# Patient Record
Sex: FEMALE | Race: WHITE | Employment: PART TIME | URBAN - METROPOLITAN AREA
[De-identification: names, ages, dates, MRNs, and addresses within clinical notes are randomized per-mention and may not be internally consistent; named-entity substitution may affect disease eponyms.]

---

## 2017-12-10 ENCOUNTER — APPOINTMENT (OUTPATIENT)
Dept: ULTRASOUND IMAGING | Age: 32
End: 2017-12-10

## 2017-12-10 ENCOUNTER — HOSPITAL ENCOUNTER (EMERGENCY)
Age: 32
Discharge: HOME | End: 2017-12-10
Attending: EMERGENCY MEDICINE

## 2017-12-10 VITALS
BODY MASS INDEX: 27.49 KG/M2 | OXYGEN SATURATION: 98 % | HEIGHT: 65 IN | TEMPERATURE: 97.7 F | DIASTOLIC BLOOD PRESSURE: 78 MMHG | SYSTOLIC BLOOD PRESSURE: 121 MMHG | WEIGHT: 165 LBS | RESPIRATION RATE: 18 BRPM | HEART RATE: 94 BPM

## 2017-12-10 DIAGNOSIS — O23.41 URINARY TRACT INFECTION IN MOTHER DURING FIRST TRIMESTER OF PREGNANCY: ICD-10-CM

## 2017-12-10 DIAGNOSIS — Z34.90 INTRAUTERINE PREGNANCY: ICD-10-CM

## 2017-12-10 DIAGNOSIS — O20.0 THREATENED ABORTION, ANTEPARTUM: Primary | ICD-10-CM

## 2017-12-10 LAB
-: ABNORMAL
ABO/RH: NORMAL
ABSOLUTE EOS #: 0.15 K/UL (ref 0–0.44)
ABSOLUTE IMMATURE GRANULOCYTE: <0.03 K/UL (ref 0–0.3)
ABSOLUTE LYMPH #: 2.06 K/UL (ref 1.1–3.7)
ABSOLUTE MONO #: 0.38 K/UL (ref 0.1–1.2)
AMORPHOUS: ABNORMAL
ANION GAP SERPL CALCULATED.3IONS-SCNC: 13 MMOL/L (ref 9–17)
BACTERIA: ABNORMAL
BASOPHILS # BLD: 0 % (ref 0–2)
BASOPHILS ABSOLUTE: <0.03 K/UL (ref 0–0.2)
BILIRUBIN URINE: NEGATIVE
BUN BLDV-MCNC: 5 MG/DL (ref 6–20)
BUN/CREAT BLD: ABNORMAL (ref 9–20)
CALCIUM SERPL-MCNC: 8.4 MG/DL (ref 8.6–10.4)
CASTS UA: ABNORMAL /LPF (ref 0–8)
CHLORIDE BLD-SCNC: 99 MMOL/L (ref 98–107)
CO2: 23 MMOL/L (ref 20–31)
COLOR: YELLOW
COMMENT UA: ABNORMAL
CREAT SERPL-MCNC: 0.44 MG/DL (ref 0.5–0.9)
CRYSTALS, UA: ABNORMAL /HPF
DIFFERENTIAL TYPE: NORMAL
DIRECT EXAM: ABNORMAL
EOSINOPHILS RELATIVE PERCENT: 2 % (ref 1–4)
EPITHELIAL CELLS UA: ABNORMAL /HPF (ref 0–5)
GFR AFRICAN AMERICAN: >60 ML/MIN
GFR NON-AFRICAN AMERICAN: >60 ML/MIN
GFR SERPL CREATININE-BSD FRML MDRD: ABNORMAL ML/MIN/{1.73_M2}
GFR SERPL CREATININE-BSD FRML MDRD: ABNORMAL ML/MIN/{1.73_M2}
GLUCOSE BLD-MCNC: 76 MG/DL (ref 70–99)
GLUCOSE URINE: NEGATIVE
HCG QUANTITATIVE: ABNORMAL IU/L
HCT VFR BLD CALC: 39.9 % (ref 36.3–47.1)
HEMOGLOBIN: 13 G/DL (ref 11.9–15.1)
IMMATURE GRANULOCYTES: 0 %
KETONES, URINE: NEGATIVE
LEUKOCYTE ESTERASE, URINE: NEGATIVE
LYMPHOCYTES # BLD: 29 % (ref 24–43)
Lab: ABNORMAL
MCH RBC QN AUTO: 30.2 PG (ref 25.2–33.5)
MCHC RBC AUTO-ENTMCNC: 32.6 G/DL (ref 28.4–34.8)
MCV RBC AUTO: 92.6 FL (ref 82.6–102.9)
MONOCYTES # BLD: 5 % (ref 3–12)
MUCUS: ABNORMAL
NITRITE, URINE: NEGATIVE
OTHER OBSERVATIONS UA: ABNORMAL
PDW BLD-RTO: 13.5 % (ref 11.8–14.4)
PH UA: 6.5 (ref 5–8)
PLATELET # BLD: 225 K/UL (ref 138–453)
PLATELET ESTIMATE: NORMAL
PMV BLD AUTO: 10.6 FL (ref 8.1–13.5)
POTASSIUM SERPL-SCNC: 4 MMOL/L (ref 3.7–5.3)
PROTEIN UA: NEGATIVE
RBC # BLD: 4.31 M/UL (ref 3.95–5.11)
RBC # BLD: NORMAL 10*6/UL
RBC UA: ABNORMAL /HPF (ref 0–4)
RENAL EPITHELIAL, UA: ABNORMAL /HPF
SEG NEUTROPHILS: 64 % (ref 36–65)
SEGMENTED NEUTROPHILS ABSOLUTE COUNT: 4.51 K/UL (ref 1.5–8.1)
SODIUM BLD-SCNC: 135 MMOL/L (ref 135–144)
SPECIFIC GRAVITY UA: 1.01 (ref 1–1.03)
SPECIMEN DESCRIPTION: ABNORMAL
STATUS: ABNORMAL
TRICHOMONAS: ABNORMAL
TURBIDITY: CLEAR
URINE HGB: ABNORMAL
UROBILINOGEN, URINE: NORMAL
WBC # BLD: 7.1 K/UL (ref 3.5–11.3)
WBC # BLD: NORMAL 10*3/UL
WBC UA: ABNORMAL /HPF (ref 0–5)
YEAST: ABNORMAL

## 2017-12-10 PROCEDURE — 87086 URINE CULTURE/COLONY COUNT: CPT

## 2017-12-10 PROCEDURE — 84702 CHORIONIC GONADOTROPIN TEST: CPT

## 2017-12-10 PROCEDURE — 85025 COMPLETE CBC W/AUTO DIFF WBC: CPT

## 2017-12-10 PROCEDURE — 87491 CHLMYD TRACH DNA AMP PROBE: CPT

## 2017-12-10 PROCEDURE — 76817 TRANSVAGINAL US OBSTETRIC: CPT

## 2017-12-10 PROCEDURE — 86900 BLOOD TYPING SEROLOGIC ABO: CPT

## 2017-12-10 PROCEDURE — 86901 BLOOD TYPING SEROLOGIC RH(D): CPT

## 2017-12-10 PROCEDURE — 80048 BASIC METABOLIC PNL TOTAL CA: CPT

## 2017-12-10 PROCEDURE — 81001 URINALYSIS AUTO W/SCOPE: CPT

## 2017-12-10 PROCEDURE — 87591 N.GONORRHOEAE DNA AMP PROB: CPT

## 2017-12-10 PROCEDURE — 87660 TRICHOMONAS VAGIN DIR PROBE: CPT

## 2017-12-10 PROCEDURE — 76815 OB US LIMITED FETUS(S): CPT

## 2017-12-10 PROCEDURE — 87480 CANDIDA DNA DIR PROBE: CPT

## 2017-12-10 PROCEDURE — 99284 EMERGENCY DEPT VISIT MOD MDM: CPT

## 2017-12-10 PROCEDURE — 87510 GARDNER VAG DNA DIR PROBE: CPT

## 2017-12-10 RX ORDER — CEPHALEXIN 500 MG/1
500 CAPSULE ORAL 4 TIMES DAILY
Qty: 28 CAPSULE | Refills: 0 | Status: SHIPPED | OUTPATIENT
Start: 2017-12-10 | End: 2017-12-17

## 2017-12-10 RX ORDER — PRENATAL VIT/IRON FUM/FOLIC AC 27MG-0.8MG
1 TABLET ORAL DAILY
Qty: 30 TABLET | Refills: 0 | Status: SHIPPED | OUTPATIENT
Start: 2017-12-10 | End: 2018-04-02

## 2017-12-10 ASSESSMENT — ENCOUNTER SYMPTOMS
BACK PAIN: 0
ABDOMINAL PAIN: 0
WHEEZING: 0
SHORTNESS OF BREATH: 0
COLOR CHANGE: 0
NAUSEA: 0
VOMITING: 0

## 2017-12-10 NOTE — ED PROVIDER NOTES
[facility]     Emergency Department     Faculty Attestation    I performed a history and physical examination of the patient and discussed management with the resident. I reviewed the residents note and agree with the documented findings and plan of care. Any areas of disagreement are noted on the chart. I was personally present for the key portions of any procedures. I have documented in the chart those procedures where I was not present during the key portions. I have reviewed the emergency nurses triage note. I agree with the chief complaint, past medical history, past surgical history, allergies, medications, social and family history as documented unless otherwise noted below. For Physician Assistant/ Nurse Practitioner cases/documentation I have personally evaluated this patient and have completed at least one if not all key elements of the E/M (history, physical exam, and MDM). Additional findings are as noted. I have personally seen and evaluated the patient. I find the patient's history and physical exam are consistent with the NP/PA documentation. I agree with the care provided, treatment rendered, disposition and follow-up plan. Reporting vaginal bleeding abdomen soft nontender no other complaints. Critical Care     Kayode Dyer M.D.   Attending Emergency  Physician              Carlton Stovall MD  12/10/17 [id number]

## 2017-12-10 NOTE — ED PROVIDER NOTES
[facility]  ED  Emergency Department Encounter  Emergency Medicine Resident     Pt Name: Gem Gonzalez  MRN: [id number]  Bridgettegfpan 1985  Date of evaluation: 12/10/17  PCP:  Jackson Reich NP    [address] [place] [address]       Chief Complaint   Patient presents with    Vaginal Bleeding     pt reports when went to bathroom before coming to ER, pt wiped and had small amount of pink on the toliet paper. pt denies pain        HISTORY OF PRESENT ILLNESS  (Location/Symptom, Timing/Onset, Context/Setting, Quality, Duration, Modifying Factors, Severity.)      Gem Gonzalez is a 28 y.o. female who presents with Vaginal spotting that happened about 10 minutes prior to arrival to the emergency department. Patient says that she had blood on wiping which she believes that was vaginal blood. Patient says that she had a home pregnancy test that was +1 month ago, her last menstrual period was in September, she has not followed up with OB due to lack of insurance. Denies any history of previous STD, denies any history of ectopic pregnancy versus early however she does have a family history of ectopic pregnancy in her sister. Patient has been taking her prenatal vitamins. Denies any vaginal discharge or pelvic pain. Denies any abdominal pain, nausea, vomiting, shortness of breath. Patient has not had a pelvic ultrasound to confirm a viable intrauterine pregnancy so far. Patient does not know her exact blood type either. PAST MEDICAL / SURGICAL / SOCIAL / FAMILY HISTORY      has a past medical history of No active medical problems. has a past surgical history that includes hernia repair; [place] tooth extraction; and cyst removal.    Social History     Social History    Marital status: Single     Spouse name: N/A    Number of children: N/A    Years of education: N/A     Occupational History    Not on file.      Social History Main Topics    Smoking status: Current Every Day Smoker     Packs/day: exam    ABO/RH       MEDICATIONS ORDERED:  Orders Placed This Encounter   Medications    Prenatal Vit-Fe Fumarate-FA (PRENATAL VITAMIN) 27-0.8 MG TABS     Sig: Take 1 tablet by mouth daily     Dispense:  30 tablet     Refill:  0    cephALEXin (KEFLEX) 500 MG capsule     Sig: Take 1 capsule by mouth 4 times daily for 7 days     Dispense:  28 capsule     Refill:  0       DDX: Normal pregnancy versus threatened  versus inevitable  versus missed  versus septic  versus ectopic pregnancy    DIAGNOSTIC RESULTS / [address] [place] [address] / [facility]     LABS:  Results for orders placed or performed during the hospital encounter of 12/10/17   VAGINITIS DNA PROBE   Result Value Ref Range    Specimen Description . VAGINA     Special Requests NOT REPORTED     Direct Exam POSITIVE for Gardnerella vaginalis. (A)     Direct Exam NEGATIVE for Candida sp. Direct Exam NEGATIVE for Trichomonas vaginalis     Direct Exam       Method of testing is a DNA probe intended for detection and identification of    Direct Exam        Candida species, Gardnerella vaginalis, and Trichomonas vaginalis nucleic acid    Direct Exam        in vaginal fluid specimens from patients with symptoms of vaginitis/vaginosis.     Direct Exam       [facility] [id number] [place] [address] [phone]    Status FINAL 12/10/2017    HCG, QUANTITATIVE, PREGNANCY   Result Value Ref Range    hCG Quant 56,394 (H) <5 IU/L   CBC WITH AUTO DIFFERENTIAL   Result Value Ref Range    WBC 7.1 3.5 - 11.3 k/uL    RBC 4.31 3.95 - 5.11 m/uL    Hemoglobin 13.0 11.9 - 15.1 g/dL    Hematocrit 39.9 36.3 - 47.1 %    MCV 92.6 82.6 - 102.9 fL    MCH 30.2 25.2 - 33.5 pg    MCHC 32.6 28.4 - 34.8 g/dL    RDW 13.5 11.8 - 14.4 %    Platelets 694 177 - 783 k/uL    MPV 10.6 8.1 - 13.5 fL    Differential Type NOT REPORTED     Seg Neutrophils 64 36 - 65 %    Lymphocytes 29 24 - 43 %    Monocytes 5 3 - 12 %    Eosinophils % 2 1 - 4 %    Basophils 0 0 vaginal spotting. Patient is pregnant however has not had a formal ultrasound done to confirm intrauterine pregnancy. We'll go ahead and get a pelvic labs including a vaginitis probe, Chlamydia gonorrhea, we'll check a quantitative beta hCG, do Rh typing on the patient given that she does not know her exact blood type. We'll also go ahead and do a formal ultrasound to confirm an intrauterine pregnancy    RADIOLOGY:  Us Ob 1 Or More Fetus Limited    Result Date: 12/10/2017  EXAMINATION: FIRST TRIMESTER OBSTETRIC ULTRASOUND 12/10/2017 TECHNIQUE: Transabdominal and transvaginal first trimester obstetric pelvic ultrasound was performed with color Doppler flow evaluation. COMPARISON: None HISTORY: ORDERING SYSTEM PROVIDED HISTORY: R/O ectopic pregnancy FINDINGS: Uterus: Uterus is anteflexed and mildly retroverted. Gestational Sac(s):  Single normal appearing gestational sac. No evidence of subchorionic hemorrhage. Yolk Sac:  Present Fetal Pole:  Single fetal pole Crown Rump Length:  2.08 cm Fetal Heart Rate:  151 beats per minute Right ovary: 2.36 x 1.63 x 1.67 cm. Normal arterial and venous flow was noted. Left ovary: 7.22 x 5.08 x 7.48 cm. A complex mass in the left ovary is noted of 5.8 x 5.65 x 3.77 cm. Appearance is that of a cyst with debris, hemorrhagic cyst or chocolate cyst.  Normal arterial and venous flow on the left ovary is noted. Free fluid:  Small amount a midline fluid is present. Measurements: Estimated gestational age by current ultrasound: 8 weeks 5 days Estimated gestational by LMP/prior ultrasound: 13 weeks 0 days Estimated Due Date: By ultrasound 17 July 2018     Single viable intrauterine gestation with ultrasound gestational age of 8 weeks 5 days and [facility] of 17 July 2018. Complex cystic mass in the left ovary is noted. Follow-up ultrasound is suggested to re-evaluate the complex cystic structure in the left ovary.      Us Ob Transvaginal    Result Date: 12/10/2017  EXAMINATION: FIRST TRIMESTER OBSTETRIC ULTRASOUND 12/10/2017 TECHNIQUE: Transabdominal and transvaginal first trimester obstetric pelvic ultrasound was performed with color Doppler flow evaluation. COMPARISON: None HISTORY: ORDERING SYSTEM PROVIDED HISTORY: R/O ectopic pregnancy FINDINGS: Uterus: Uterus is anteflexed and mildly retroverted. Gestational Sac(s):  Single normal appearing gestational sac. No evidence of subchorionic hemorrhage. Yolk Sac:  Present Fetal Pole:  Single fetal pole Crown Rump Length:  2.08 cm Fetal Heart Rate:  151 beats per minute Right ovary: 2.36 x 1.63 x 1.67 cm. Normal arterial and venous flow was noted. Left ovary: 7.22 x 5.08 x 7.48 cm. A complex mass in the left ovary is noted of 5.8 x 5.65 x 3.77 cm. Appearance is that of a cyst with debris, hemorrhagic cyst or chocolate cyst.  Normal arterial and venous flow on the left ovary is noted. Free fluid:  Small amount a midline fluid is present. Measurements: Estimated gestational age by current ultrasound: 8 weeks 5 days Estimated gestational by LMP/prior ultrasound: 13 weeks 0 days Estimated Due Date: By ultrasound 17 July 2018     Single viable intrauterine gestation with ultrasound gestational age of 8 weeks 5 days and [facility] of 17 July  2018. Complex cystic mass in the left ovary is noted. Follow-up ultrasound is suggested to re-evaluate the complex cystic structure in the left ovary. EKG  None    All EKG's are interpreted by the Emergency Department Physician who either signs or Co-signs this chart in the absence of a cardiologist.    EMERGENCY DEPARTMENT COURSE:    Formal ultrasound confirmed a normal intrauterine pregnancy. Patient with Gardnerella seen on vaginitis probe, will not treat with Flagyl given that she is within the first trimester. Patient with some bacteria in his urine, we'll go ahead and treat with Keflex.   We'll give her a prescription for prenatal vitamins as well as contact information for ObGyn to establish herself as

## 2017-12-11 LAB
C TRACH DNA GENITAL QL NAA+PROBE: NEGATIVE
CULTURE: NORMAL
CULTURE: NORMAL
Lab: NORMAL
N. GONORRHOEAE DNA: NEGATIVE
SPECIMEN DESCRIPTION: NORMAL
STATUS: NORMAL

## 2017-12-22 ENCOUNTER — TELEPHONE (OUTPATIENT)
Dept: OBGYN | Age: 32
End: 2017-12-22

## 2017-12-22 NOTE — TELEPHONE ENCOUNTER
----- Message from Nancy Barnes sent at 12/21/2017 10:24 AM EST -----  Contact: pt  Pt needs to schedule first OB appt; confirmed by U/S at [facility]. Please call patient back after 2pm today.

## 2018-01-02 ENCOUNTER — OFFICE VISIT (OUTPATIENT)
Dept: FAMILY MEDICINE CLINIC | Age: 33
End: 2018-01-02

## 2018-01-02 VITALS
DIASTOLIC BLOOD PRESSURE: 70 MMHG | BODY MASS INDEX: 28.66 KG/M2 | TEMPERATURE: 98.1 F | SYSTOLIC BLOOD PRESSURE: 120 MMHG | HEART RATE: 87 BPM | WEIGHT: 172 LBS | OXYGEN SATURATION: 98 % | HEIGHT: 65 IN

## 2018-01-02 DIAGNOSIS — Z00.00 HEALTH CARE MAINTENANCE: ICD-10-CM

## 2018-01-02 DIAGNOSIS — F11.20 UNCOMPLICATED OPIOID DEPENDENCE (HCC): ICD-10-CM

## 2018-01-02 DIAGNOSIS — F11.90 METHADONE USE: ICD-10-CM

## 2018-01-02 DIAGNOSIS — F41.0 PANIC ATTACK: Primary | ICD-10-CM

## 2018-01-02 DIAGNOSIS — Z3A.12 12 WEEKS GESTATION OF PREGNANCY: ICD-10-CM

## 2018-01-02 PROBLEM — F11.10 HEROIN ABUSE (HCC): Status: ACTIVE | Noted: 2018-01-02

## 2018-01-02 PROCEDURE — 99214 OFFICE O/P EST MOD 30 MIN: CPT | Performed by: NURSE PRACTITIONER

## 2018-01-02 RX ORDER — METHADONE HYDROCHLORIDE 10 MG/5ML
135 SOLUTION ORAL DAILY
COMMUNITY

## 2018-01-02 RX ORDER — GABAPENTIN 400 MG/1
400 CAPSULE ORAL 3 TIMES DAILY
Qty: 90 CAPSULE | Refills: 2 | Status: SHIPPED | OUTPATIENT
Start: 2018-01-02 | End: 2018-04-02 | Stop reason: SDUPTHER

## 2018-01-02 ASSESSMENT — ENCOUNTER SYMPTOMS: RESPIRATORY NEGATIVE: 1

## 2018-01-02 ASSESSMENT — PATIENT HEALTH QUESTIONNAIRE - PHQ9
1. LITTLE INTEREST OR PLEASURE IN DOING THINGS: 0
SUM OF ALL RESPONSES TO PHQ9 QUESTIONS 1 & 2: 0
2. FEELING DOWN, DEPRESSED OR HOPELESS: 0
SUM OF ALL RESPONSES TO PHQ QUESTIONS 1-9: 0

## 2018-01-02 NOTE — PROGRESS NOTES
[address] [facility] [address]  Dept: [phone]  Dept Fax: [phone]      Darrell Rubi is a 28 y.o. female who presents today for her medical conditions/complaints as noted below. Darrell Rubi is c/o of Other (re establish care)            HPI:     HPI  Jordana Heath is here for anxiety. She is on methadone for heroin addiction. She is having panic attacks since stopping the heroin. She ahs been clean for 1.5 mo. She did use some of her mom's gabapentin and is threatened that she will be dropped from the program due to dropping dirty, since she has no script for the gabapentin. Dr. Cecilia Haas told her that she needed to get a script for the gabapentin. She is 12 weeks pregnant. She has an appt with [facility] in Jan for her first OB appt. She would like to see Dr. Elvina Spatz, she delivered her daughter. She has been using the gabapentin all along.      No results found for: LABA1C          ( goal A1C is < 7)   No results found for: LABMICR  No results found for: LDLCHOLESTEROL, LDLCALC    (goal LDL is <100)   AST (U/L)   Date Value   03/21/2013 17     ALT (U/L)   Date Value   03/21/2013 16     BUN (mg/dL)   Date Value   12/10/2017 5 (L)     BP Readings from Last 3 Encounters:   01/02/18 120/70   12/10/17 121/78   10/01/16 130/88          (goal 120/80)    Past Medical History:   Diagnosis Date    No active medical problems       Past Surgical History:   Procedure Laterality Date    CYST REMOVAL      RT wrist    HERNIA REPAIR      WISDOM TOOTH EXTRACTION         Family History   Problem Relation Age of Onset    Asthma Mother     Emphysema Mother     COPD Mother     Thyroid Disease Mother     Other Mother      pre colon cancer    Emphysema Father     COPD Father     Neuropathy Father        Social History   Substance Use Topics    Smoking status: Current Every Day Smoker     Packs/day: 1.00     Years: 12.00     Types: Cigarettes   

## 2018-01-05 ENCOUNTER — HOSPITAL ENCOUNTER (EMERGENCY)
Age: 33
Discharge: HOME | End: 2018-01-05
Attending: EMERGENCY MEDICINE

## 2018-01-05 VITALS
TEMPERATURE: 97.9 F | DIASTOLIC BLOOD PRESSURE: 76 MMHG | SYSTOLIC BLOOD PRESSURE: 133 MMHG | OXYGEN SATURATION: 98 % | HEART RATE: 85 BPM | RESPIRATION RATE: 19 BRPM

## 2018-01-05 DIAGNOSIS — O03.9 COMPLETED INEVITABLE ABORTION: Primary | ICD-10-CM

## 2018-01-05 LAB
ABSOLUTE EOS #: 0.19 K/UL (ref 0–0.44)
ABSOLUTE IMMATURE GRANULOCYTE: 0.03 K/UL (ref 0–0.3)
ABSOLUTE LYMPH #: 1.91 K/UL (ref 1.1–3.7)
ABSOLUTE MONO #: 0.29 K/UL (ref 0.1–1.2)
BASOPHILS # BLD: 0 % (ref 0–2)
BASOPHILS ABSOLUTE: <0.03 K/UL (ref 0–0.2)
DIFFERENTIAL TYPE: ABNORMAL
EOSINOPHILS RELATIVE PERCENT: 3 % (ref 1–4)
HCG QUANTITATIVE: 2633 IU/L
HCT VFR BLD CALC: 40.1 % (ref 36.3–47.1)
HEMOGLOBIN: 13 G/DL (ref 11.9–15.1)
IMMATURE GRANULOCYTES: 0 %
LYMPHOCYTES # BLD: 28 % (ref 24–43)
MCH RBC QN AUTO: 30.8 PG (ref 25.2–33.5)
MCHC RBC AUTO-ENTMCNC: 32.4 G/DL (ref 28.4–34.8)
MCV RBC AUTO: 95 FL (ref 82.6–102.9)
MONOCYTES # BLD: 4 % (ref 3–12)
PDW BLD-RTO: 15.4 % (ref 11.8–14.4)
PLATELET # BLD: 224 K/UL (ref 138–453)
PLATELET ESTIMATE: ABNORMAL
PMV BLD AUTO: 10.1 FL (ref 8.1–13.5)
RBC # BLD: 4.22 M/UL (ref 3.95–5.11)
RBC # BLD: ABNORMAL 10*6/UL
SEG NEUTROPHILS: 65 % (ref 36–65)
SEGMENTED NEUTROPHILS ABSOLUTE COUNT: 4.49 K/UL (ref 1.5–8.1)
WBC # BLD: 6.9 K/UL (ref 3.5–11.3)
WBC # BLD: ABNORMAL 10*3/UL

## 2018-01-05 PROCEDURE — 88305 TISSUE EXAM BY PATHOLOGIST: CPT

## 2018-01-05 PROCEDURE — 85025 COMPLETE CBC W/AUTO DIFF WBC: CPT

## 2018-01-05 PROCEDURE — 99284 EMERGENCY DEPT VISIT MOD MDM: CPT

## 2018-01-05 PROCEDURE — 84702 CHORIONIC GONADOTROPIN TEST: CPT

## 2018-01-05 ASSESSMENT — ENCOUNTER SYMPTOMS
TROUBLE SWALLOWING: 0
BLOOD IN STOOL: 0
DIARRHEA: 0
COUGH: 0
SORE THROAT: 0
VOMITING: 0
CHEST TIGHTNESS: 0
ABDOMINAL PAIN: 0
SHORTNESS OF BREATH: 0
NAUSEA: 0
BACK PAIN: 0
WHEEZING: 0

## 2018-01-06 NOTE — ED PROVIDER NOTES
IU/L   CBC WITH AUTO DIFFERENTIAL   Result Value Ref Range    WBC 6.9 3.5 - 11.3 k/uL    RBC 4.22 3.95 - 5.11 m/uL    Hemoglobin 13.0 11.9 - 15.1 g/dL    Hematocrit 40.1 36.3 - 47.1 %    MCV 95.0 82.6 - 102.9 fL    MCH 30.8 25.2 - 33.5 pg    MCHC 32.4 28.4 - 34.8 g/dL    RDW 15.4 (H) 11.8 - 14.4 %    Platelets 384 563 - 059 k/uL    MPV 10.1 8.1 - 13.5 fL    Differential Type NOT REPORTED     Seg Neutrophils 65 36 - 65 %    Lymphocytes 28 24 - 43 %    Monocytes 4 3 - 12 %    Eosinophils % 3 1 - 4 %    Basophils 0 0 - 2 %    Immature Granulocytes 0 0 %    Segs Absolute 4.49 1.50 - 8.10 k/uL    Absolute Lymph # 1.91 1.10 - 3.70 k/uL    Absolute Mono # 0.29 0.10 - 1.20 k/uL    Absolute Eos # 0.19 0.00 - 0.44 k/uL    Basophils # <0.03 0.00 - 0.20 k/uL    Absolute Immature Granulocyte 0.03 0.00 - 0.30 k/uL    WBC Morphology NOT REPORTED     RBC Morphology ANISOCYTOSIS PRESENT     Platelet Estimate NOT REPORTED        IMPRESSION: 20-year-old female presents with vaginal bleeding. Patient currently on half weeks pregnant. Denies any pain. Was here weeks ago for similar. Patient very emotional currently. States she's been taking her prenatal vitamins. Well-appearing on exam and not tachycardic or hypotensive. We'll do a bedside ultrasound to evaluate status of fetus, get quantitative beta hCG as well as CBC. Patient had blood typing done last visit and is O-positive, so will not require Rho-omaira.      RADIOLOGY:  EARLY PREGNANCY ULTRASOUND:  A limited, bedside pelvic ultrasound was performed using a transabdominal probe. The medical necessity was to evaluate for signs of an intrauterine versus ectopic pregnancy. The structures studied were the uterus and its contents, bladder, ovaries, vesicouterine space, and rectouterine space. FINDINGS:  Evidence for a previous IUP was seen, however no fetal heart tones were seen. Given patient's symptoms, likely that patient is having a miscarriage.    The study was

## 2018-01-06 NOTE — ED NOTES
Pt presented to ed via self c/o vaginal bleeding starting yesterday that has increased. Pt is A&Ox4 with even non labored breaths on arrival. Pt ambulated to room with steady gait. PT denies any abd cramping. Pt states she is 13 wks pregnant. PT states she has been taking all her pre  vitamins but has not seen OB for this pregnancy. PT states she is scheduled to see her OB on 18. Pt given paper scrubs and pad.      Yumiko Cuello RN  18       Yumiko Cuello RN  18

## 2018-01-09 LAB — SURGICAL PATHOLOGY REPORT: NORMAL

## 2018-01-19 ENCOUNTER — TELEPHONE (OUTPATIENT)
Dept: OBGYN | Age: 33
End: 2018-01-19

## 2018-04-02 ENCOUNTER — OFFICE VISIT (OUTPATIENT)
Dept: FAMILY MEDICINE CLINIC | Age: 33
End: 2018-04-02

## 2018-04-02 VITALS
OXYGEN SATURATION: 100 % | BODY MASS INDEX: 32.45 KG/M2 | SYSTOLIC BLOOD PRESSURE: 120 MMHG | HEART RATE: 96 BPM | WEIGHT: 195 LBS | DIASTOLIC BLOOD PRESSURE: 70 MMHG | TEMPERATURE: 98.5 F

## 2018-04-02 DIAGNOSIS — R76.8 HEPATITIS C ANTIBODY TEST POSITIVE: ICD-10-CM

## 2018-04-02 DIAGNOSIS — O03.9 MISCARRIAGE: ICD-10-CM

## 2018-04-02 DIAGNOSIS — F41.0 PANIC ATTACK: Primary | ICD-10-CM

## 2018-04-02 PROCEDURE — 99214 OFFICE O/P EST MOD 30 MIN: CPT | Performed by: NURSE PRACTITIONER

## 2018-04-02 RX ORDER — GABAPENTIN 400 MG/1
400 CAPSULE ORAL 4 TIMES DAILY
Qty: 120 CAPSULE | Refills: 2 | Status: SHIPPED | OUTPATIENT
Start: 2018-04-02 | End: 2018-04-19

## 2018-04-02 ASSESSMENT — ENCOUNTER SYMPTOMS: RESPIRATORY NEGATIVE: 1

## 2018-04-19 ENCOUNTER — OFFICE VISIT (OUTPATIENT)
Dept: FAMILY MEDICINE CLINIC | Age: 33
End: 2018-04-19

## 2018-04-19 VITALS
WEIGHT: 193 LBS | HEART RATE: 73 BPM | DIASTOLIC BLOOD PRESSURE: 76 MMHG | RESPIRATION RATE: 20 BRPM | OXYGEN SATURATION: 96 % | BODY MASS INDEX: 32.15 KG/M2 | TEMPERATURE: 97.9 F | HEIGHT: 65 IN | SYSTOLIC BLOOD PRESSURE: 124 MMHG

## 2018-04-19 DIAGNOSIS — F41.9 ANXIETY: Primary | ICD-10-CM

## 2018-04-19 PROCEDURE — 99213 OFFICE O/P EST LOW 20 MIN: CPT | Performed by: NURSE PRACTITIONER

## 2018-04-19 RX ORDER — GABAPENTIN 600 MG/1
600 TABLET ORAL 3 TIMES DAILY
Qty: 90 TABLET | Refills: 0 | Status: SHIPPED | OUTPATIENT
Start: 2018-04-19 | End: 2018-06-04 | Stop reason: SDUPTHER

## 2018-04-19 ASSESSMENT — ENCOUNTER SYMPTOMS: RESPIRATORY NEGATIVE: 1

## 2018-06-04 ENCOUNTER — HOSPITAL ENCOUNTER (OUTPATIENT)
Age: 33
Setting detail: SPECIMEN
Discharge: HOME | End: 2018-06-04

## 2018-06-04 ENCOUNTER — OFFICE VISIT (OUTPATIENT)
Dept: FAMILY MEDICINE CLINIC | Age: 33
End: 2018-06-04

## 2018-06-04 VITALS
HEART RATE: 87 BPM | SYSTOLIC BLOOD PRESSURE: 110 MMHG | DIASTOLIC BLOOD PRESSURE: 68 MMHG | TEMPERATURE: 98.1 F | WEIGHT: 194 LBS | OXYGEN SATURATION: 98 % | BODY MASS INDEX: 32.28 KG/M2

## 2018-06-04 DIAGNOSIS — F19.11 DRUG ABUSE IN REMISSION (HCC): ICD-10-CM

## 2018-06-04 DIAGNOSIS — R35.0 URINARY FREQUENCY: Primary | ICD-10-CM

## 2018-06-04 DIAGNOSIS — F11.20 UNCOMPLICATED OPIOID DEPENDENCE (HCC): ICD-10-CM

## 2018-06-04 DIAGNOSIS — R30.0 DYSURIA: ICD-10-CM

## 2018-06-04 DIAGNOSIS — F41.9 ANXIETY: ICD-10-CM

## 2018-06-04 PROBLEM — Z3A.12 12 WEEKS GESTATION OF PREGNANCY: Status: RESOLVED | Noted: 2018-01-02 | Resolved: 2018-06-04

## 2018-06-04 LAB
BILIRUBIN, POC: ABNORMAL
BLOOD URINE, POC: ABNORMAL
CLARITY, POC: ABNORMAL
COLOR, POC: ABNORMAL
GLUCOSE URINE, POC: ABNORMAL
KETONES, POC: ABNORMAL
LEUKOCYTE EST, POC: ABNORMAL
NITRITE, POC: POSITIVE
PH, POC: 5.5
PROTEIN, POC: ABNORMAL
SPECIFIC GRAVITY, POC: >1.03
UROBILINOGEN, POC: 1

## 2018-06-04 PROCEDURE — 81003 URINALYSIS AUTO W/O SCOPE: CPT | Performed by: NURSE PRACTITIONER

## 2018-06-04 PROCEDURE — 99214 OFFICE O/P EST MOD 30 MIN: CPT | Performed by: NURSE PRACTITIONER

## 2018-06-04 RX ORDER — NITROFURANTOIN 25; 75 MG/1; MG/1
100 CAPSULE ORAL 2 TIMES DAILY
Qty: 10 CAPSULE | Refills: 0 | Status: SHIPPED | OUTPATIENT
Start: 2018-06-04 | End: 2018-06-09

## 2018-06-04 RX ORDER — GABAPENTIN 400 MG/1
400 CAPSULE ORAL 3 TIMES DAILY
COMMUNITY
End: 2018-06-04 | Stop reason: ALTCHOICE

## 2018-06-04 RX ORDER — GABAPENTIN 600 MG/1
600 TABLET ORAL 4 TIMES DAILY
Qty: 120 TABLET | Refills: 2 | Status: SHIPPED | OUTPATIENT
Start: 2018-06-04 | End: 2018-08-30 | Stop reason: ALTCHOICE

## 2018-06-04 ASSESSMENT — ENCOUNTER SYMPTOMS: RESPIRATORY NEGATIVE: 1

## 2018-06-06 LAB
CULTURE: ABNORMAL
CULTURE: ABNORMAL
Lab: ABNORMAL
ORGANISM: ABNORMAL
SPECIMEN DESCRIPTION: ABNORMAL
STATUS: ABNORMAL

## 2018-06-13 ENCOUNTER — HOSPITAL ENCOUNTER (OUTPATIENT)
Age: 33
Discharge: HOME | End: 2018-06-13

## 2018-06-13 PROCEDURE — 36415 COLL VENOUS BLD VENIPUNCTURE: CPT

## 2018-06-13 PROCEDURE — 87522 HEPATITIS C REVRS TRNSCRPJ: CPT

## 2018-06-13 PROCEDURE — 87902 NFCT AGT GNTYP ALYS HEP C: CPT

## 2018-06-15 LAB
DIRECT EXAM: ABNORMAL
Lab: ABNORMAL
SPECIMEN DESCRIPTION: ABNORMAL
SPECIMEN DESCRIPTION: ABNORMAL
STATUS: ABNORMAL

## 2018-06-18 LAB
HCV QUANTITATIVE: NORMAL
HEPATITIS C GENOTYPE: NORMAL

## 2018-08-30 ENCOUNTER — OFFICE VISIT (OUTPATIENT)
Dept: FAMILY MEDICINE CLINIC | Age: 33
End: 2018-08-30

## 2018-08-30 VITALS
OXYGEN SATURATION: 98 % | SYSTOLIC BLOOD PRESSURE: 120 MMHG | HEART RATE: 80 BPM | BODY MASS INDEX: 33.28 KG/M2 | TEMPERATURE: 98.3 F | WEIGHT: 200 LBS | DIASTOLIC BLOOD PRESSURE: 70 MMHG

## 2018-08-30 DIAGNOSIS — F41.9 ANXIETY: ICD-10-CM

## 2018-08-30 PROCEDURE — 99213 OFFICE O/P EST LOW 20 MIN: CPT | Performed by: NURSE PRACTITIONER

## 2018-08-30 RX ORDER — GABAPENTIN 600 MG/1
600 TABLET ORAL 4 TIMES DAILY
Qty: 120 TABLET | Refills: 2 | Status: CANCELLED | OUTPATIENT
Start: 2018-08-30 | End: 2018-09-29

## 2018-08-30 RX ORDER — GABAPENTIN 400 MG/1
400 CAPSULE ORAL 4 TIMES DAILY
Qty: 120 CAPSULE | Refills: 2 | Status: SHIPPED | OUTPATIENT
Start: 2018-08-30 | End: 2018-11-28 | Stop reason: SDUPTHER

## 2018-08-30 ASSESSMENT — ENCOUNTER SYMPTOMS: RESPIRATORY NEGATIVE: 1

## 2018-08-30 NOTE — PROGRESS NOTES
[address] [place] [id number]  Dept: [phone]  Dept Fax: [phone]      Emeka Slade is a 35 y.o. female who presents today for her medical conditions/complaints as noted below. Emeka Slade is c/o of Medication Refill and Anxiety            HPI:     HPI  Katlyn is here today fro refills on her gabepentin. She is finding that the dosing she is using is making her too tired and sleepy in the daytime. She would like a reduction in the dose. She would like to cut dosing to 400 mg from 600 mg. She continues in methadone clinic. No results found for: LABA1C          ( goal A1C is < 7)   No results found for: LABMICR  No results found for: LDLCHOLESTEROL, LDLCALC    (goal LDL is <100)   AST (U/L)   Date Value   03/21/2013 17     ALT (U/L)   Date Value   03/21/2013 16     BUN (mg/dL)   Date Value   12/10/2017 5 (L)     BP Readings from Last 3 Encounters:   08/30/18 120/70   06/04/18 110/68   04/19/18 124/76          (goal 120/80)    Past Medical History:   Diagnosis Date    No active medical problems       Past Surgical History:   Procedure Laterality Date    CYST REMOVAL      RT wrist    HERNIA REPAIR      WISDOM TOOTH EXTRACTION         Family History   Problem Relation Age of Onset    Asthma Mother     Emphysema Mother     COPD Mother     Thyroid Disease Mother     Other Mother         pre colon cancer    Emphysema Father     COPD Father     Neuropathy Father        Social History   Substance Use Topics    Smoking status: Current Every Day Smoker     Packs/day: 0.50     Years: 12.00     Types: Cigarettes    Smokeless tobacco: Never Used    Alcohol use No      Current Outpatient Prescriptions   Medication Sig Dispense Refill    gabapentin (NEURONTIN) 400 MG capsule Take 1 capsule by mouth 4 times daily for 30 days. . 120 capsule 2    methadone 10 MG/5ML solution Take 105 mg by mouth daily.        No current answered. Pt voiced understanding. Reviewed health maintenance. Instructed to continue current medications, diet and exercise. Patient agreed with treatment plan. Follow up as directed.      Electronically signed by KODAK Ryan CNP on 8/30/2018

## 2018-08-30 NOTE — PROGRESS NOTES
Patient is present for medication refills and anxiety. Medications and pharmacy reviewed with patient. Patients pain level is a 0 /10. Patient would like to decrease gabapentin from 600mg to 400 mg. Visit Information    Have you changed or started any medications since your last visit including any over-the-counter medicines, vitamins, or herbal medicines? no   Have you stopped taking any of your medications? Is so, why? -  no  Are you having any side effects from any of your medications? - no    Have you seen any other physician or provider since your last visit?  no   Have you had any other diagnostic tests since your last visit?  no   Have you been seen in the emergency room and/or had an admission in a hospital since we last saw you?  no   Have you had your routine dental cleaning in the past 6 months?  yes -      Do you have an active MyChart account? If no, what is the barrier?   Yes    Patient Care Team:  KODAK Dsouza - CNP as PCP - General (Nurse Practitioner)  Armaan Hodgson as Consulting Physician (Obstetrics & Gynecology)    Medical History Review  Past Medical, Family, and Social History reviewed and does contribute to the patient presenting condition    Health Maintenance   Topic Date Due    DTaP/Tdap/Td vaccine (1 - Tdap) 01/02/2019 (Originally 8/9/2004)    Flu vaccine (1) 01/02/2019 (Originally 9/1/2018)    Pneumococcal med risk (1 of 1 - PPSV23) 01/02/2019 (Originally 8/9/2004)    Cervical cancer screen  02/08/2019    HIV screen  Completed

## 2018-11-09 ENCOUNTER — HOSPITAL ENCOUNTER (OUTPATIENT)
Age: 33
Setting detail: SPECIMEN
Discharge: HOME | End: 2018-11-09

## 2018-11-09 LAB
ABSOLUTE EOS #: 0.16 K/UL (ref 0–0.44)
ABSOLUTE IMMATURE GRANULOCYTE: 0.04 K/UL (ref 0–0.3)
ABSOLUTE LYMPH #: 2.01 K/UL (ref 1.1–3.7)
ABSOLUTE MONO #: 0.5 K/UL (ref 0.1–1.2)
ALBUMIN SERPL-MCNC: 4 G/DL (ref 3.5–5.2)
ALBUMIN/GLOBULIN RATIO: 1 (ref 1–2.5)
ALP BLD-CCNC: 122 U/L (ref 35–104)
ALT SERPL-CCNC: 55 U/L (ref 5–33)
ANION GAP SERPL CALCULATED.3IONS-SCNC: 15 MMOL/L (ref 9–17)
AST SERPL-CCNC: 29 U/L
BASOPHILS # BLD: 0 % (ref 0–2)
BASOPHILS ABSOLUTE: 0.03 K/UL (ref 0–0.2)
BILIRUB SERPL-MCNC: 0.27 MG/DL (ref 0.3–1.2)
BUN BLDV-MCNC: 8 MG/DL (ref 6–20)
BUN/CREAT BLD: ABNORMAL (ref 9–20)
CALCIUM SERPL-MCNC: 9.6 MG/DL (ref 8.6–10.4)
CHLORIDE BLD-SCNC: 99 MMOL/L (ref 98–107)
CO2: 24 MMOL/L (ref 20–31)
CREAT SERPL-MCNC: 0.73 MG/DL (ref 0.5–0.9)
DIFFERENTIAL TYPE: ABNORMAL
EOSINOPHILS RELATIVE PERCENT: 2 % (ref 1–4)
GFR AFRICAN AMERICAN: >60 ML/MIN
GFR NON-AFRICAN AMERICAN: >60 ML/MIN
GFR SERPL CREATININE-BSD FRML MDRD: ABNORMAL ML/MIN/{1.73_M2}
GFR SERPL CREATININE-BSD FRML MDRD: ABNORMAL ML/MIN/{1.73_M2}
GLUCOSE BLD-MCNC: 88 MG/DL (ref 70–99)
HCT VFR BLD CALC: 43.8 % (ref 36.3–47.1)
HEMOGLOBIN: 14.1 G/DL (ref 11.9–15.1)
HEPATITIS B SURFACE ANTIGEN: NONREACTIVE
HEPATITIS C ANTIBODY: REACTIVE
HIV AG/AB: NONREACTIVE
IMMATURE GRANULOCYTES: 1 %
LYMPHOCYTES # BLD: 23 % (ref 24–43)
MCH RBC QN AUTO: 32.4 PG (ref 25.2–33.5)
MCHC RBC AUTO-ENTMCNC: 32.2 G/DL (ref 28.4–34.8)
MCV RBC AUTO: 100.7 FL (ref 82.6–102.9)
MONOCYTES # BLD: 6 % (ref 3–12)
NRBC AUTOMATED: 0 PER 100 WBC
PDW BLD-RTO: 14.5 % (ref 11.8–14.4)
PLATELET # BLD: ABNORMAL K/UL (ref 138–453)
PLATELET ESTIMATE: ABNORMAL
PLATELET, FLUORESCENCE: 247 K/UL (ref 138–453)
PLATELET, IMMATURE FRACTION: 5.8 % (ref 1.1–10.3)
PMV BLD AUTO: ABNORMAL FL (ref 8.1–13.5)
POTASSIUM SERPL-SCNC: 4.7 MMOL/L (ref 3.7–5.3)
RBC # BLD: 4.35 M/UL (ref 3.95–5.11)
RBC # BLD: ABNORMAL 10*6/UL
SEG NEUTROPHILS: 69 % (ref 36–65)
SEGMENTED NEUTROPHILS ABSOLUTE COUNT: 6.1 K/UL (ref 1.5–8.1)
SODIUM BLD-SCNC: 138 MMOL/L (ref 135–144)
T. PALLIDUM, IGG: NONREACTIVE
TOTAL PROTEIN: 8 G/DL (ref 6.4–8.3)
WBC # BLD: 8.8 K/UL (ref 3.5–11.3)
WBC # BLD: ABNORMAL 10*3/UL

## 2018-11-13 LAB
DIRECT EXAM: ABNORMAL
Lab: ABNORMAL
SPECIMEN DESCRIPTION: ABNORMAL
STATUS: ABNORMAL

## 2018-11-28 ENCOUNTER — OFFICE VISIT (OUTPATIENT)
Dept: FAMILY MEDICINE CLINIC | Age: 33
End: 2018-11-28

## 2018-11-28 VITALS
DIASTOLIC BLOOD PRESSURE: 70 MMHG | BODY MASS INDEX: 34.28 KG/M2 | HEART RATE: 78 BPM | SYSTOLIC BLOOD PRESSURE: 120 MMHG | TEMPERATURE: 97.7 F | WEIGHT: 206 LBS | OXYGEN SATURATION: 99 %

## 2018-11-28 DIAGNOSIS — F41.9 ANXIETY: Primary | ICD-10-CM

## 2018-11-28 DIAGNOSIS — E66.09 CLASS 1 OBESITY DUE TO EXCESS CALORIES WITHOUT SERIOUS COMORBIDITY WITH BODY MASS INDEX (BMI) OF 34.0 TO 34.9 IN ADULT: ICD-10-CM

## 2018-11-28 PROCEDURE — 99213 OFFICE O/P EST LOW 20 MIN: CPT | Performed by: NURSE PRACTITIONER

## 2018-11-28 RX ORDER — GABAPENTIN 400 MG/1
400 CAPSULE ORAL 4 TIMES DAILY
Qty: 120 CAPSULE | Refills: 2 | Status: SHIPPED | OUTPATIENT
Start: 2018-11-28 | End: 2019-03-19 | Stop reason: SDUPTHER

## 2018-11-28 ASSESSMENT — PATIENT HEALTH QUESTIONNAIRE - PHQ9
1. LITTLE INTEREST OR PLEASURE IN DOING THINGS: 0
SUM OF ALL RESPONSES TO PHQ QUESTIONS 1-9: 0
SUM OF ALL RESPONSES TO PHQ QUESTIONS 1-9: 0
2. FEELING DOWN, DEPRESSED OR HOPELESS: 0
SUM OF ALL RESPONSES TO PHQ9 QUESTIONS 1 & 2: 0

## 2018-11-28 ASSESSMENT — ENCOUNTER SYMPTOMS: RESPIRATORY NEGATIVE: 1

## 2018-11-28 NOTE — PATIENT INSTRUCTIONS
trying to quit smoking. · Consider signing up for a smoking cessation program, such as the American Lung Association's Freedom from Smoking program.  · Get text messaging support. Go to the website at www.smokefree. gov to sign up for the [facility] program.  · Set a quit date. Pick your date carefully so that it is not right in the middle of a big deadline or stressful time. Once you quit, do not even take a puff. Get rid of all ashtrays and lighters after your last cigarette. Clean your house and your clothes so that they do not smell of smoke. · Learn how to be a nonsmoker. Think about ways you can avoid those things that make you reach for a cigarette. ? Avoid situations that put you at greatest risk for smoking. For some people, it is hard to have a drink with friends without smoking. For others, they might skip a coffee break with coworkers who smoke. ? Change your daily routine. Take a different route to work or eat a meal in a different place. · Cut down on stress. Calm yourself or release tension by doing an activity you enjoy, such as reading a book, taking a hot bath, or gardening. · Talk to your doctor or pharmacist about nicotine replacement therapy, which replaces the nicotine in your body. You still get nicotine but you do not use tobacco. Nicotine replacement products help you slowly reduce the amount of nicotine you need. These products come in several forms, many of them available over-the-counter:  ? Nicotine patches  ? Nicotine gum and lozenges  ? Nicotine inhaler  · Ask your doctor about bupropion (Wellbutrin) or varenicline (Chantix), which are prescription medicines. They do not contain nicotine. They help you by reducing withdrawal symptoms, such as stress and anxiety. · Some people find hypnosis, acupuncture, and massage helpful for ending the smoking habit. · Eat a healthy diet and get regular exercise.  Having healthy habits will help your body move past its craving for nicotine. · Be prepared to keep trying. Most people are not successful the first few times they try to quit. Do not get mad at yourself if you smoke again. Make a list of things you learned and think about when you want to try again, such as next week, next month, or next year. Where can you learn more? Go to https://TransferWisepepicewdank.OneMob. org and sign in to your [employer] account. Enter D092 in the Axeda box to learn more about \"Stopping Smoking: Care Instructions. \"     If you do not have an account, please click on the \"Sign Up Now\" link. Current as of: November 29, 2017  Content Version: 11.8  © 2550-1432 Healthwise, Incorporated. Care instructions adapted under license by [facility] ([facility]). If you have questions about a medical condition or this instruction, always ask your healthcare professional. [address] any warranty or liability for your use of this information.

## 2018-11-28 NOTE — PROGRESS NOTES
mouth 4 times daily for 30 days. . 120 capsule 2    methadone 10 MG/5ML solution Take 105 mg by mouth daily. No current facility-administered medications for this visit. No Known Allergies    Health Maintenance   Topic Date Due    DTaP/Tdap/Td vaccine (1 - Tdap) 01/02/2019 (Originally 8/9/2004)    Flu vaccine (1) 01/02/2019 (Originally 9/1/2018)    Pneumococcal med risk (1 of 1 - PPSV23) 01/02/2019 (Originally 8/9/2004)    Cervical cancer screen  02/08/2019    HIV screen  Completed          Review of Systems   Respiratory: Negative. Cardiovascular: Negative. Negative for chest pain. Musculoskeletal: Negative. Psychiatric/Behavioral: Positive for dysphoric mood and sleep disturbance. Negative for decreased concentration, self-injury and suicidal ideas. The patient is not nervous/anxious. Objective:     /70 (Site: Left Upper Arm, Position: Sitting, Cuff Size: Large Adult)   Pulse 78   Temp 97.7 °F (36.5 °C) (Oral)   Wt 206 lb (93.4 kg)   LMP 11/28/2018   SpO2 99%   Breastfeeding? No   BMI 34.28 kg/m²   Physical Exam   Constitutional: She is oriented to person, place, and time. She appears well-developed and well-nourished. HENT:   Head: Normocephalic. Eyes: Conjunctivae are normal.   Cardiovascular: Normal rate and regular rhythm. Pulmonary/Chest: Effort normal and breath sounds normal.   Neurological: She is alert and oriented to person, place, and time. Skin: Skin is warm and dry. Psychiatric: She has a normal mood and affect. Her behavior is normal. Judgment and thought content normal.         Assessment:      1. Anxiety    2. Class 1 obesity due to excess calories without serious comorbidity with body mass index (BMI) of 34.0 to 34.9 in adult                     Plan:      No orders of the defined types were placed in this encounter.     Orders Placed This Encounter   Medications    gabapentin (NEURONTIN) 400 MG capsule     Sig: Take 1 capsule by mouth 4

## 2019-02-06 ENCOUNTER — HOSPITAL ENCOUNTER (EMERGENCY)
Age: 34
Discharge: HOME | End: 2019-02-06
Attending: EMERGENCY MEDICINE

## 2019-02-06 ENCOUNTER — APPOINTMENT (OUTPATIENT)
Dept: GENERAL RADIOLOGY | Age: 34
End: 2019-02-06

## 2019-02-06 VITALS
HEART RATE: 79 BPM | DIASTOLIC BLOOD PRESSURE: 75 MMHG | WEIGHT: 200 LBS | RESPIRATION RATE: 14 BRPM | SYSTOLIC BLOOD PRESSURE: 104 MMHG | OXYGEN SATURATION: 98 % | TEMPERATURE: 98.3 F | BODY MASS INDEX: 33.28 KG/M2

## 2019-02-06 DIAGNOSIS — L03.011 PARONYCHIA OF FINGER OF RIGHT HAND: Primary | ICD-10-CM

## 2019-02-06 PROCEDURE — 10060 I&D ABSCESS SIMPLE/SINGLE: CPT

## 2019-02-06 PROCEDURE — 2500000003 HC RX 250 WO HCPCS: Performed by: EMERGENCY MEDICINE

## 2019-02-06 PROCEDURE — 87077 CULTURE AEROBIC IDENTIFY: CPT

## 2019-02-06 PROCEDURE — G0382 LEV 3 HOSP TYPE B ED VISIT: HCPCS

## 2019-02-06 PROCEDURE — 87070 CULTURE OTHR SPECIMN AEROBIC: CPT

## 2019-02-06 PROCEDURE — 99283 EMERGENCY DEPT VISIT LOW MDM: CPT

## 2019-02-06 PROCEDURE — 87205 SMEAR GRAM STAIN: CPT

## 2019-02-06 PROCEDURE — 73130 X-RAY EXAM OF HAND: CPT

## 2019-02-06 PROCEDURE — 6370000000 HC RX 637 (ALT 250 FOR IP): Performed by: PHYSICIAN ASSISTANT

## 2019-02-06 PROCEDURE — 87186 SC STD MICRODIL/AGAR DIL: CPT

## 2019-02-06 RX ORDER — CEPHALEXIN 500 MG/1
500 CAPSULE ORAL 4 TIMES DAILY
Qty: 28 CAPSULE | Refills: 0 | Status: SHIPPED | OUTPATIENT
Start: 2019-02-06 | End: 2019-02-13

## 2019-02-06 RX ORDER — LIDOCAINE HYDROCHLORIDE 10 MG/ML
20 INJECTION, SOLUTION INFILTRATION; PERINEURAL ONCE
Status: COMPLETED | OUTPATIENT
Start: 2019-02-06 | End: 2019-02-06

## 2019-02-06 RX ORDER — CEPHALEXIN 500 MG/1
500 CAPSULE ORAL ONCE
Status: COMPLETED | OUTPATIENT
Start: 2019-02-06 | End: 2019-02-06

## 2019-02-06 RX ORDER — IBUPROFEN 800 MG/1
800 TABLET ORAL EVERY 8 HOURS PRN
Qty: 20 TABLET | Refills: 0 | Status: SHIPPED | OUTPATIENT
Start: 2019-02-06 | End: 2019-03-19 | Stop reason: SDUPTHER

## 2019-02-06 RX ADMIN — CEPHALEXIN 500 MG: 500 CAPSULE ORAL at 13:11

## 2019-02-06 RX ADMIN — LIDOCAINE HYDROCHLORIDE 20 ML: 10 INJECTION, SOLUTION INFILTRATION; PERINEURAL at 12:08

## 2019-02-06 ASSESSMENT — PAIN DESCRIPTION - DESCRIPTORS: DESCRIPTORS: DISCOMFORT

## 2019-02-06 ASSESSMENT — PAIN SCALES - GENERAL
PAINLEVEL_OUTOF10: 10
PAINLEVEL_OUTOF10: 8

## 2019-02-06 ASSESSMENT — ENCOUNTER SYMPTOMS
SORE THROAT: 0
RHINORRHEA: 0
NAUSEA: 0
COLOR CHANGE: 0
WHEEZING: 0
VOMITING: 0
COUGH: 0
BACK PAIN: 0

## 2019-02-06 ASSESSMENT — PAIN DESCRIPTION - LOCATION: LOCATION: FINGER (COMMENT WHICH ONE)

## 2019-02-06 ASSESSMENT — PAIN DESCRIPTION - PAIN TYPE: TYPE: ACUTE PAIN

## 2019-02-06 ASSESSMENT — PAIN DESCRIPTION - ORIENTATION: ORIENTATION: LEFT

## 2019-02-08 LAB
CULTURE: ABNORMAL
CULTURE: ABNORMAL
DIRECT EXAM: ABNORMAL
Lab: ABNORMAL
ORGANISM: ABNORMAL
SPECIMEN DESCRIPTION: ABNORMAL
STATUS: ABNORMAL

## 2019-02-22 DIAGNOSIS — F41.9 ANXIETY: ICD-10-CM

## 2019-02-25 RX ORDER — GABAPENTIN 400 MG/1
400 CAPSULE ORAL 4 TIMES DAILY
Qty: 120 CAPSULE | Refills: 0 | OUTPATIENT
Start: 2019-02-25 | End: 2019-03-27

## 2019-02-27 DIAGNOSIS — F41.9 ANXIETY: ICD-10-CM

## 2019-02-27 RX ORDER — GABAPENTIN 400 MG/1
400 CAPSULE ORAL 4 TIMES DAILY
Qty: 120 CAPSULE | Refills: 2 | OUTPATIENT
Start: 2019-02-27 | End: 2019-03-29

## 2019-03-01 ENCOUNTER — TELEPHONE (OUTPATIENT)
Dept: FAMILY MEDICINE CLINIC | Age: 34
End: 2019-03-01

## 2019-03-19 ENCOUNTER — OFFICE VISIT (OUTPATIENT)
Dept: FAMILY MEDICINE CLINIC | Age: 34
End: 2019-03-19
Payer: MEDICAID

## 2019-03-19 VITALS
WEIGHT: 201 LBS | OXYGEN SATURATION: 98 % | TEMPERATURE: 98.2 F | BODY MASS INDEX: 33.45 KG/M2 | DIASTOLIC BLOOD PRESSURE: 70 MMHG | SYSTOLIC BLOOD PRESSURE: 120 MMHG | HEART RATE: 72 BPM

## 2019-03-19 DIAGNOSIS — F41.9 ANXIETY: ICD-10-CM

## 2019-03-19 DIAGNOSIS — N39.44 URINARY INCONTINENCE, NOCTURNAL ENURESIS: ICD-10-CM

## 2019-03-19 PROCEDURE — 99214 OFFICE O/P EST MOD 30 MIN: CPT | Performed by: NURSE PRACTITIONER

## 2019-03-19 RX ORDER — OXYBUTYNIN CHLORIDE 5 MG/1
5 TABLET, EXTENDED RELEASE ORAL DAILY
Qty: 30 TABLET | Refills: 2 | Status: SHIPPED | OUTPATIENT
Start: 2019-03-19 | End: 2020-04-13

## 2019-03-19 RX ORDER — IBUPROFEN 800 MG/1
800 TABLET ORAL EVERY 8 HOURS PRN
Qty: 20 TABLET | Refills: 1 | Status: SHIPPED | OUTPATIENT
Start: 2019-03-19 | End: 2020-04-13

## 2019-03-19 RX ORDER — GABAPENTIN 400 MG/1
400 CAPSULE ORAL 4 TIMES DAILY
Qty: 120 CAPSULE | Refills: 2 | Status: SHIPPED | OUTPATIENT
Start: 2019-03-19 | End: 2019-06-24 | Stop reason: SDUPTHER

## 2019-03-19 ASSESSMENT — ENCOUNTER SYMPTOMS: RESPIRATORY NEGATIVE: 1

## 2019-03-19 ASSESSMENT — PATIENT HEALTH QUESTIONNAIRE - PHQ9
1. LITTLE INTEREST OR PLEASURE IN DOING THINGS: 0
SUM OF ALL RESPONSES TO PHQ QUESTIONS 1-9: 0
SUM OF ALL RESPONSES TO PHQ9 QUESTIONS 1 & 2: 0
SUM OF ALL RESPONSES TO PHQ QUESTIONS 1-9: 0
2. FEELING DOWN, DEPRESSED OR HOPELESS: 0

## 2019-06-24 ENCOUNTER — OFFICE VISIT (OUTPATIENT)
Dept: FAMILY MEDICINE CLINIC | Age: 34
End: 2019-06-24
Payer: MEDICAID

## 2019-06-24 VITALS
HEIGHT: 65 IN | OXYGEN SATURATION: 99 % | DIASTOLIC BLOOD PRESSURE: 60 MMHG | WEIGHT: 200 LBS | SYSTOLIC BLOOD PRESSURE: 100 MMHG | BODY MASS INDEX: 33.32 KG/M2 | HEART RATE: 61 BPM

## 2019-06-24 DIAGNOSIS — F41.9 ANXIETY: ICD-10-CM

## 2019-06-24 PROCEDURE — 99213 OFFICE O/P EST LOW 20 MIN: CPT | Performed by: NURSE PRACTITIONER

## 2019-06-24 RX ORDER — GABAPENTIN 400 MG/1
CAPSULE ORAL
Qty: 150 CAPSULE | Refills: 2 | Status: SHIPPED | OUTPATIENT
Start: 2019-06-24 | End: 2019-09-18 | Stop reason: SDUPTHER

## 2019-06-24 ASSESSMENT — ENCOUNTER SYMPTOMS: RESPIRATORY NEGATIVE: 1

## 2019-06-24 NOTE — PROGRESS NOTES
[address] [place] [address]  Dept: [phone]  Dept Fax: [phone]      Paula Grimaldo is a 35 y.o. female who presents today for hermedical conditions/complaints as noted below. Josemarie Kendallmarisel is c/o of 3 Month Follow-Up and Medication Refill            HPI:      KEEGAN Potts is here today for medication refills. she is using gabapentin for anxiety. She is finding that she is feeling anxious in the am and would like to use 800 mg then. She is using 800 mg at HS and this is working well for her. She has not began having her methadone reduced. She is worried about how she will feel and this is keeping her dosing at her current dose.    No results found for: LABA1C          ( goal A1Cis < 7)   No results found for: LABMICR  No results found for: LDLCHOLESTEROL, LDLCALC    (goal LDL is <100)   AST (U/L)   Date Value   11/09/2018 29     ALT (U/L)   Date Value   11/09/2018 55 (H)     BUN (mg/dL)   Date Value   11/09/2018 8     BP Readings from Last 3 Encounters:   06/24/19 100/60   03/19/19 120/70   02/06/19 104/75          (goal 120/80)    Past Medical History:   Diagnosis Date    No active medical problems       Past Surgical History:   Procedure Laterality Date    CYST REMOVAL      RT wrist    HERNIA REPAIR      WISDOM TOOTH EXTRACTION         Family History   Problem Relation Age of Onset    Asthma Mother     Emphysema Mother     COPD Mother     Thyroid Disease Mother     Other Mother         pre colon cancer    Emphysema Father     COPD Father     Neuropathy Father           Social History     Tobacco Use    Smoking status: Current Every Day Smoker     Packs/day: 0.50     Years: 12.00     Pack years: 6.00     Types: Cigarettes    Smokeless tobacco: Never Used   Substance Use Topics    Alcohol use: No         Current Outpatient Medications   Medication Sig Dispense Refill    gabapentin (NEURONTIN) 400 MG capsule Take 2 am and pm and 1 mid day 150 capsule 2    methadone 10 MG/5ML solution Take 135 mg by mouth daily.  ibuprofen (ADVIL;MOTRIN) 800 MG tablet Take 1 tablet by mouth every 8 hours as needed for Pain 20 tablet 1    oxybutynin (DITROPAN-XL) 5 MG extended release tablet Take 1 tablet by mouth daily 30 tablet 2     No current facility-administered medications for this visit. No Known Allergies    Health Maintenance   Topic Date Due    Cervical cancer screen  02/08/2019    Varicella Vaccine (1 of 2 - 13+ 2-dose series) 06/24/2020 (Originally 8/9/1998)    Pneumococcal 0-64 years Vaccine (1 of 1 - PPSV23) 06/24/2020 (Originally 8/9/1991)    DTaP/Tdap/Td vaccine (1 - Tdap) 06/24/2024 (Originally 8/9/2004)    Flu vaccine (Season Ended) 09/01/2019    HIV screen  Completed          Review of Systems   Respiratory: Negative. Cardiovascular: Negative. Negative for chest pain. Musculoskeletal: Negative. Psychiatric/Behavioral: Positive for dysphoric mood and sleep disturbance. The patient is nervous/anxious. Objective:     /60 (Site: Left Upper Arm, Position: Sitting, Cuff Size: Medium Adult)   Pulse 61   Ht 5' 5\" (1.651 m)   Wt 200 lb (90.7 kg)   SpO2 99%   BMI 33.28 kg/m²   Physical Exam   Constitutional: She is oriented to person, place, and time. She appears well-developed and well-nourished. HENT:   Head: Normocephalic. Eyes: Conjunctivae are normal.   Cardiovascular: Normal rate and regular rhythm. Pulmonary/Chest: Effort normal and breath sounds normal.   Neurological: She is alert and oriented to person, place, and time. Skin: Skin is warm and dry. Psychiatric: She has a normal mood and affect. Her behavior is normal. Judgment and thought content normal.         Assessment:      1. Anxiety                     Plan:      No orders of the defined types were placed in this encounter. 1. Anxiety    - gabapentin (NEURONTIN) 400 MG capsule;  Take 2 am and pm and 1 mid day

## 2019-06-24 NOTE — PROGRESS NOTES
Pt comes in today for 3 month med refill  Wants to discuss gabapentin     Visit Information    Have you changed or started any medications since your last visit including any over-the-counter medicines, vitamins, or herbal medicines? no   Have you stopped taking any of your medications? Is so, why? -  no  Are you having any side effects from any of your medications? - no    Have you seen any other physician or provider since your last visit?  no   Have you had any other diagnostic tests since your last visit?  no   Have you been seen in the emergency room and/or had an admission in a hospital since we last saw you?  no   Have you had your routine dental cleaning in the past 6 months?  no     Do you have an active MyChart account? If no, what is the barrier?   Yes    Patient Care Team:  KODAK Leon CNP as PCP - General (Nurse Practitioner)  KODAK Leon CNP as PCP - [facility] Empaneled Provider  Mulugeta Terry as Consulting Physician (Obstetrics & Gynecology)    Medical History Review  Past Medical, Family, and Social History reviewed and does contribute to the patient presenting condition    Health Maintenance   Topic Date Due    Pneumococcal 0-64 years Vaccine (1 of 1 - PPSV23) 08/09/1991    Varicella Vaccine (1 of 2 - 13+ 2-dose series) 08/09/1998    DTaP/Tdap/Td vaccine (1 - Tdap) 08/09/2004    Cervical cancer screen  02/08/2019    Flu vaccine (Season Ended) 09/01/2019    HIV screen  Completed

## 2019-09-18 ENCOUNTER — OFFICE VISIT (OUTPATIENT)
Dept: FAMILY MEDICINE CLINIC | Age: 34
End: 2019-09-18
Payer: MEDICAID

## 2019-09-18 VITALS
HEART RATE: 67 BPM | OXYGEN SATURATION: 97 % | SYSTOLIC BLOOD PRESSURE: 120 MMHG | WEIGHT: 203 LBS | BODY MASS INDEX: 33.78 KG/M2 | DIASTOLIC BLOOD PRESSURE: 74 MMHG

## 2019-09-18 DIAGNOSIS — F41.9 ANXIETY: ICD-10-CM

## 2019-09-18 PROCEDURE — 99213 OFFICE O/P EST LOW 20 MIN: CPT | Performed by: NURSE PRACTITIONER

## 2019-09-18 RX ORDER — GABAPENTIN 400 MG/1
CAPSULE ORAL
Qty: 150 CAPSULE | Refills: 2 | Status: SHIPPED | OUTPATIENT
Start: 2019-09-18 | End: 2019-12-12 | Stop reason: SDUPTHER

## 2019-09-18 ASSESSMENT — ENCOUNTER SYMPTOMS: RESPIRATORY NEGATIVE: 1

## 2019-09-18 NOTE — PROGRESS NOTES
by mouth daily 30 tablet 2    methadone 10 MG/5ML solution Take 135 mg by mouth daily. No current facility-administered medications for this visit. No Known Allergies    Health Maintenance   Topic Date Due    Cervical cancer screen  02/08/2019    Varicella Vaccine (1 of 2 - 13+ 2-dose series) 06/24/2020 (Originally 8/9/1998)    Pneumococcal 0-64 years Vaccine (1 of 1 - PPSV23) 06/24/2020 (Originally 8/9/1991)    Flu vaccine (1) 09/18/2020 (Originally 9/1/2019)    DTaP/Tdap/Td vaccine (1 - Tdap) 06/24/2024 (Originally 8/9/2004)    HIV screen  Completed          Review of Systems   Respiratory: Negative. Cardiovascular: Negative. Negative for chest pain. Musculoskeletal: Negative. Psychiatric/Behavioral: Positive for dysphoric mood and sleep disturbance. The patient is nervous/anxious. Objective:     /74 (Site: Left Upper Arm, Position: Sitting, Cuff Size: Medium Adult)   Pulse 67   Wt 203 lb (92.1 kg)   SpO2 97%   BMI 33.78 kg/m²   Physical Exam   Constitutional: She is oriented to person, place, and time. She appears well-developed and well-nourished. HENT:   Head: Normocephalic. Eyes: Conjunctivae are normal.   Cardiovascular: Normal rate and regular rhythm. Pulmonary/Chest: Effort normal and breath sounds normal.   Neurological: She is alert and oriented to person, place, and time. Skin: Skin is warm and dry. Psychiatric: She has a normal mood and affect. Her behavior is normal. Judgment and thought content normal.         Assessment:      1. Anxiety                     Plan:      No orders of the defined types were placed in this encounter. 1. Anxiety    - gabapentin (NEURONTIN) 400 MG capsule; Take 2 in am and pm, and 1 mid day  Dispense: 150 capsule; Refill: 2      No follow-ups on file. Patient given educational materials - see patientinstructions. Discussed use, benefit, and side effects of prescribed medications. All patient questions

## 2019-11-22 ENCOUNTER — E-VISIT (OUTPATIENT)
Dept: FAMILY MEDICINE CLINIC | Age: 34
End: 2019-11-22
Payer: MEDICAID

## 2019-11-22 ENCOUNTER — E-VISIT (OUTPATIENT)
Dept: FAMILY MEDICINE CLINIC | Age: 34
End: 2019-11-22

## 2019-11-22 DIAGNOSIS — R30.0 DYSURIA: Primary | ICD-10-CM

## 2019-11-22 PROCEDURE — 99444 PR PHYSICIAN ONLINE EVALUATION & MANAGEMENT SERVICE: CPT | Performed by: FAMILY MEDICINE

## 2019-11-22 PROCEDURE — 98969 PR NONPHYSICIAN ONLINE ASSESSMENT AND MANAGEMENT: CPT | Performed by: NURSE PRACTITIONER

## 2019-11-22 RX ORDER — NITROFURANTOIN 25; 75 MG/1; MG/1
100 CAPSULE ORAL 2 TIMES DAILY
Qty: 10 CAPSULE | Refills: 0 | Status: SHIPPED | OUTPATIENT
Start: 2019-11-22 | End: 2019-11-22

## 2019-11-22 RX ORDER — SULFAMETHOXAZOLE AND TRIMETHOPRIM 800; 160 MG/1; MG/1
1 TABLET ORAL 2 TIMES DAILY
Qty: 10 TABLET | Refills: 0 | Status: SHIPPED | OUTPATIENT
Start: 2019-11-22 | End: 2019-11-27

## 2019-12-12 ENCOUNTER — HOSPITAL ENCOUNTER (OUTPATIENT)
Age: 34
Setting detail: SPECIMEN
Discharge: HOME | End: 2019-12-12

## 2019-12-12 ENCOUNTER — OFFICE VISIT (OUTPATIENT)
Dept: FAMILY MEDICINE CLINIC | Age: 34
End: 2019-12-12
Payer: MEDICAID

## 2019-12-12 VITALS
SYSTOLIC BLOOD PRESSURE: 110 MMHG | OXYGEN SATURATION: 97 % | WEIGHT: 201 LBS | BODY MASS INDEX: 33.45 KG/M2 | DIASTOLIC BLOOD PRESSURE: 64 MMHG | HEART RATE: 75 BPM

## 2019-12-12 DIAGNOSIS — K04.7 INFECTION OF TOOTH: Primary | ICD-10-CM

## 2019-12-12 DIAGNOSIS — F41.9 ANXIETY: ICD-10-CM

## 2019-12-12 PROBLEM — F11.10 HEROIN ABUSE (HCC): Status: RESOLVED | Noted: 2018-01-02 | Resolved: 2019-12-12

## 2019-12-12 LAB
ABSOLUTE EOS #: 0.28 K/UL (ref 0–0.44)
ABSOLUTE IMMATURE GRANULOCYTE: <0.03 K/UL (ref 0–0.3)
ABSOLUTE LYMPH #: 2.27 K/UL (ref 1.1–3.7)
ABSOLUTE MONO #: 0.39 K/UL (ref 0.1–1.2)
ALBUMIN SERPL-MCNC: 4.5 G/DL (ref 3.5–5.2)
ALBUMIN/GLOBULIN RATIO: 1.2 (ref 1–2.5)
ALP BLD-CCNC: 123 U/L (ref 35–104)
ALT SERPL-CCNC: 142 U/L (ref 5–33)
ANION GAP SERPL CALCULATED.3IONS-SCNC: 14 MMOL/L (ref 9–17)
AST SERPL-CCNC: 72 U/L
BASOPHILS # BLD: 1 % (ref 0–2)
BASOPHILS ABSOLUTE: 0.04 K/UL (ref 0–0.2)
BILIRUB SERPL-MCNC: 0.26 MG/DL (ref 0.3–1.2)
BUN BLDV-MCNC: 12 MG/DL (ref 6–20)
BUN/CREAT BLD: ABNORMAL (ref 9–20)
CALCIUM SERPL-MCNC: 9.6 MG/DL (ref 8.6–10.4)
CHLORIDE BLD-SCNC: 102 MMOL/L (ref 98–107)
CO2: 24 MMOL/L (ref 20–31)
CREAT SERPL-MCNC: 0.82 MG/DL (ref 0.5–0.9)
DIFFERENTIAL TYPE: ABNORMAL
EOSINOPHILS RELATIVE PERCENT: 5 % (ref 1–4)
GFR AFRICAN AMERICAN: >60 ML/MIN
GFR NON-AFRICAN AMERICAN: >60 ML/MIN
GFR SERPL CREATININE-BSD FRML MDRD: ABNORMAL ML/MIN/{1.73_M2}
GFR SERPL CREATININE-BSD FRML MDRD: ABNORMAL ML/MIN/{1.73_M2}
GLUCOSE BLD-MCNC: 84 MG/DL (ref 70–99)
HCT VFR BLD CALC: 43.2 % (ref 36.3–47.1)
HEMOGLOBIN: 14 G/DL (ref 11.9–15.1)
HEPATITIS B SURFACE ANTIGEN: NONREACTIVE
HIV AG/AB: NONREACTIVE
IMMATURE GRANULOCYTES: 0 %
LYMPHOCYTES # BLD: 40 % (ref 24–43)
MCH RBC QN AUTO: 32.8 PG (ref 25.2–33.5)
MCHC RBC AUTO-ENTMCNC: 32.4 G/DL (ref 28.4–34.8)
MCV RBC AUTO: 101.2 FL (ref 82.6–102.9)
MONOCYTES # BLD: 7 % (ref 3–12)
NRBC AUTOMATED: 0 PER 100 WBC
PDW BLD-RTO: 13.2 % (ref 11.8–14.4)
PLATELET # BLD: 227 K/UL (ref 138–453)
PLATELET ESTIMATE: ABNORMAL
PMV BLD AUTO: 11.6 FL (ref 8.1–13.5)
POTASSIUM SERPL-SCNC: 4.6 MMOL/L (ref 3.7–5.3)
RBC # BLD: 4.27 M/UL (ref 3.95–5.11)
RBC # BLD: ABNORMAL 10*6/UL
SEG NEUTROPHILS: 47 % (ref 36–65)
SEGMENTED NEUTROPHILS ABSOLUTE COUNT: 2.65 K/UL (ref 1.5–8.1)
SODIUM BLD-SCNC: 140 MMOL/L (ref 135–144)
T. PALLIDUM, IGG: NONREACTIVE
TOTAL PROTEIN: 8.4 G/DL (ref 6.4–8.3)
WBC # BLD: 5.6 K/UL (ref 3.5–11.3)
WBC # BLD: ABNORMAL 10*3/UL

## 2019-12-12 PROCEDURE — 99213 OFFICE O/P EST LOW 20 MIN: CPT | Performed by: NURSE PRACTITIONER

## 2019-12-12 RX ORDER — AMOXICILLIN 875 MG/1
875 TABLET, COATED ORAL 2 TIMES DAILY
Qty: 20 TABLET | Refills: 0 | Status: SHIPPED | OUTPATIENT
Start: 2019-12-12 | End: 2019-12-22

## 2019-12-12 RX ORDER — GABAPENTIN 400 MG/1
CAPSULE ORAL
Qty: 150 CAPSULE | Refills: 2 | Status: SHIPPED | OUTPATIENT
Start: 2019-12-12 | End: 2020-03-12 | Stop reason: SDUPTHER

## 2019-12-12 ASSESSMENT — ENCOUNTER SYMPTOMS: RESPIRATORY NEGATIVE: 1

## 2019-12-13 LAB
DIRECT EXAM: ABNORMAL
DIRECT EXAM: ABNORMAL
Lab: ABNORMAL
SPECIMEN DESCRIPTION: ABNORMAL

## 2019-12-17 LAB
HCV QUANTITATIVE: NORMAL
HEPATITIS C GENOTYPE: NORMAL

## 2020-03-12 ENCOUNTER — OFFICE VISIT (OUTPATIENT)
Dept: FAMILY MEDICINE CLINIC | Age: 35
End: 2020-03-12
Payer: COMMERCIAL

## 2020-03-12 VITALS
HEART RATE: 69 BPM | SYSTOLIC BLOOD PRESSURE: 112 MMHG | DIASTOLIC BLOOD PRESSURE: 64 MMHG | WEIGHT: 211 LBS | BODY MASS INDEX: 35.11 KG/M2 | OXYGEN SATURATION: 96 %

## 2020-03-12 PROCEDURE — 99213 OFFICE O/P EST LOW 20 MIN: CPT | Performed by: NURSE PRACTITIONER

## 2020-03-12 RX ORDER — GABAPENTIN 400 MG/1
CAPSULE ORAL
Qty: 150 CAPSULE | Refills: 2 | Status: SHIPPED | OUTPATIENT
Start: 2020-03-12 | End: 2020-06-17 | Stop reason: SDUPTHER

## 2020-03-12 ASSESSMENT — PATIENT HEALTH QUESTIONNAIRE - PHQ9
SUM OF ALL RESPONSES TO PHQ9 QUESTIONS 1 & 2: 0
SUM OF ALL RESPONSES TO PHQ QUESTIONS 1-9: 0
1. LITTLE INTEREST OR PLEASURE IN DOING THINGS: 0
2. FEELING DOWN, DEPRESSED OR HOPELESS: 0
SUM OF ALL RESPONSES TO PHQ QUESTIONS 1-9: 0

## 2020-03-12 ASSESSMENT — ENCOUNTER SYMPTOMS: RESPIRATORY NEGATIVE: 1

## 2020-03-12 NOTE — PROGRESS NOTES
[facility]  [facility] [address]  [address] [facility] [address]  Dept: [phone]  Dept Fax: [phone]      Maria De Jesus Fabian is a 29 y.o. female who presents today for hermedical conditions/complaints as noted below. Carly Kendallmarisel is c/o of 3 Month Follow-Up and Medication Refill            HPI:      HPI  Sal Latham is here today for medication refills. Using gabapentin for chronic pain and anxiety. Seeing therapist at [facility]. Continues on methadone.      No results found for: LABA1C          ( goal A1Cis < 7)   No results found for: LABMICR  No results found for: LDLCHOLESTEROL, LDLCALC    (goal LDL is <100)   AST (U/L)   Date Value   12/12/2019 72 (H)     ALT (U/L)   Date Value   12/12/2019 142 (H)     BUN (mg/dL)   Date Value   12/12/2019 12     BP Readings from Last 3 Encounters:   03/12/20 112/64   12/12/19 110/64   09/18/19 120/74          (goal 120/80)    Past Medical History:   Diagnosis Date    No active medical problems       Past Surgical History:   Procedure Laterality Date    CYST REMOVAL      RT wrist    HERNIA REPAIR      WISDOM TOOTH EXTRACTION         Family History   Problem Relation Age of Onset    Asthma Mother     Emphysema Mother     COPD Mother     Thyroid Disease Mother     Other Mother         pre colon cancer    Emphysema Father     COPD Father     Neuropathy Father           Social History     Tobacco Use    Smoking status: Current Every Day Smoker     Packs/day: 0.50     Years: 12.00     Pack years: 6.00     Types: Cigarettes    Smokeless tobacco: Never Used   Substance Use Topics    Alcohol use: No         Current Outpatient Medications   Medication Sig Dispense Refill    gabapentin (NEURONTIN) 400 MG capsule Take 2 in am and pm, and 1 mid day 150 capsule 2    ibuprofen (ADVIL;MOTRIN) 800 MG tablet Take 1 tablet by mouth every 8 hours as needed for Pain 20 tablet 1    oxybutynin (DITROPAN-XL) 5 MG extended release tablet Take 1 tablet by mouth daily 30 tablet 2    methadone 10 MG/5ML solution Take 135 mg by mouth daily. No current facility-administered medications for this visit. Allergies   Allergen Reactions    Macrobid [Nitrofurantoin] Nausea And Vomiting       Health Maintenance   Topic Date Due    Cervical cancer screen  06/12/2020 (Originally 2/8/2019)    Varicella vaccine (1 of 2 - 2-dose childhood series) 06/24/2020 (Originally 8/9/1986)    Pneumococcal 0-64 years Vaccine (1 of 1 - PPSV23) 06/24/2020 (Originally 8/9/1991)    Flu vaccine (1) 09/18/2020 (Originally 9/1/2019)    DTaP/Tdap/Td vaccine (1 - Tdap) 06/24/2024 (Originally 8/9/2004)    Shingles Vaccine (1 of 2) 08/09/2035    HIV screen  Completed    Hepatitis A vaccine  Aged Out    Hepatitis B vaccine  Aged Out    Hib vaccine  Aged Out    Meningococcal (ACWY) vaccine  Aged Out          Review of Systems   Respiratory: Negative. Cardiovascular: Negative. Negative for chest pain. Musculoskeletal: Negative. Psychiatric/Behavioral: Positive for dysphoric mood and sleep disturbance. The patient is nervous/anxious. Objective:     /64 (Site: Right Upper Arm, Position: Sitting, Cuff Size: Medium Adult)   Pulse 69   Wt 211 lb (95.7 kg)   SpO2 96%   BMI 35.11 kg/m²   Physical Exam  Constitutional:       Appearance: She is well-developed. HENT:      Head: Normocephalic. Eyes:      Conjunctiva/sclera: Conjunctivae normal.   Cardiovascular:      Rate and Rhythm: Normal rate and regular rhythm. Pulmonary:      Effort: Pulmonary effort is normal.      Breath sounds: Normal breath sounds. Skin:     General: Skin is warm and dry. Neurological:      Mental Status: She is alert and oriented to person, place, and time. Psychiatric:         Behavior: Behavior normal.         Thought Content: Thought content normal.         Judgment: Judgment normal.           Assessment:      1.  Anxiety                     Plan:      No orders of the defined types were placed in this encounter. Orders Placed This Encounter   Medications    gabapentin (NEURONTIN) 400 MG capsule     Sig: Take 2 in am and pm, and 1 mid day     Dispense:  150 capsule     Refill:  2       No follow-ups on file. Patient given educational materials - see patientinstructions. Discussed use, benefit, and side effects of prescribed medications. All patient questions answered. Pt voiced understanding. Reviewed health maintenance. Instructed to continue current medications, diet and exercise. Patient agreedwith treatment plan. Follow up as directed.      Electronically signed by KODAK Mendosa CNP on 3/12/2020

## 2020-03-12 NOTE — LETTER
disease. Overdose or dangerous interactions with alcohol and other medications may occur, leading to death. Hyperalgesia may develop, in which patients receiving opioids for the treatment of pain may actually become more sensitive to certain painful stimuli, and in some cases, experience pain from ordinarily non-painful stimuli. Women between the ages of 14-53 who could become pregnant should carefully weigh the risks and benefits of opioids with their physicians, as these medications increase the risk of pregnancy complications, including miscarriage,  delivery and stillbirth. It is also possible for babies to be born addicted to opioids. Opioid dependence withdrawal symptoms may include; feelings of uneasiness, increased pain, irritability, belly pain, diarrhea, sweats and goose-flesh. Benzodiazepines and non-benzodiazepine sleep medications: These medications can lead to problems such as addiction/dependence, sedation, fatigue, lightheadedness, dizziness, incoordination, falls, depression, hallucinations, and impaired judgment, memory and concentration. The ability to drive and operate machinery may also be affected. Abnormal sleep-related behaviors have been reported, including sleep walking, driving, making telephone calls, eating, or having sex while not fully awake. These medications can suppress breathing and worsen sleep apnea, particularly when combined with alcohol or other sedating medications, potentially leading to death. Dependence withdrawal symptoms may include tremors, anxiety, hallucinations and seizures. Stimulants:  Common adverse effects include addiction/dependence, increased blood pressure and heart rate, decreased appetite, nausea, involuntary weight loss, insomnia, irritability, and headaches.   These risks may increase when these medications are combined with other stimulants, such as caffeine pills or energy drinks, certain weight loss supplements and oral decongestants. Dependence withdrawal symptoms may include depressed mood, loss of interest, suicidal thoughts, anxiety, fatigue, appetite changes and agitation. Testosterone replacement therapy:  Potential side effects include increased risk of stroke and heart attack, blood clots, increased blood pressure, increased cholesterol, enlarged prostate, sleep apnea, irritability/aggression and other mood disorders, and decreased fertility. Other:     1. I understand that I have the following responsibilities:  · I will take medications at the dose and frequency prescribed. · I will not increase or change how I take my medications without the approval of the health care provider who signs this Medication Agreement. · I will arrange for refills at the prescribed interval ONLY during regular office hours. I will not ask for refills earlier than agreed, after-hours, on holidays or on weekends. · I will obtain all refills for these medications at  ·  ____________________________________  pharmacy (phone number  ·  ________________________), with full consent for my provider and pharmacist to exchange information in writing or verbally. · I will not request any pain medications or controlled substances from other providers and will inform this provider of all other medications I am taking. · I will inform my other health care providers that I am taking these medications and of the existence of this [facility] [address]. In the event of an emergency, I will provide the same information to the emergency department providers. · I will protect my prescriptions and medications. I understand that lost or misplaced prescriptions will not be replaced. · I will keep medications only for my own use and will not share them with others. I will keep all medications away from children. · I agree to participate in any medical, psychological or psychiatric assessments recommended by my provider. which may also result in my being prevented from receiving further care from this office. · Other:____________________________________________________________________    AGREEMENT:    I have read the above and have had all of my questions answered. For chronic disease management, I know that my symptoms can be managed with many types of treatments. A chronic medication trial may be part of my treatment, but I must be an active participant in my care. Medication therapy is only one part of my symptom management plan. In some cases, there may be limited scientific evidence to support the chronic use of certain medications to improve symptoms and daily function. Furthermore, in certain circumstances, there may be scientific information that suggests that use of chronic controlled substances may actually worsen my symptoms and increase my risk of unintentional death directly related to this medication therapy. I know that if my provider feels my risk from controlled medications is greater than my benefit, I will have my controlled substance medication(s) compassionately lowered or removed altogether. I agree to a controlled substance medication trial.      I further agree to allow this office to contact my HIPAA contact on file if there are concerns about my safety and use of controlled medications. I have agreed to use the following medications above as instructed by my physician and as stated in this [facility] [address].      Patient Signature:  ______________________  Date:3/12/2020 or _____________    Provider Signature:______________________  Date:3/12/2020 or _____________

## 2020-03-25 ENCOUNTER — PATIENT MESSAGE (OUTPATIENT)
Dept: FAMILY MEDICINE CLINIC | Age: 35
End: 2020-03-25

## 2020-03-25 NOTE — TELEPHONE ENCOUNTER
From: Morales Underwood  To: KODAK Chin - CNP  Sent: 3/25/2020 2:10 PM EDT  Subject: Prescription Question    I was just wondering if Jen could please call me in some antibiotics. I have a terrible toothache. I do not have a dentist right now and I guess I cannot call one either because of the virus. It's a back molar that is just throbbing. I have been taking Motrin and Tylenol, but it's still throbbing. I use the [facility] on [address].    Thank you, Zarina Jimenez

## 2020-03-26 RX ORDER — CLINDAMYCIN HYDROCHLORIDE 300 MG/1
300 CAPSULE ORAL 2 TIMES DAILY
Qty: 14 CAPSULE | Refills: 0 | Status: SHIPPED | OUTPATIENT
Start: 2020-03-26 | End: 2020-04-02

## 2020-04-12 ENCOUNTER — PATIENT MESSAGE (OUTPATIENT)
Dept: FAMILY MEDICINE CLINIC | Age: 35
End: 2020-04-12

## 2020-04-12 ENCOUNTER — TELEPHONE (OUTPATIENT)
Dept: FAMILY MEDICINE CLINIC | Age: 35
End: 2020-04-12

## 2020-04-13 ENCOUNTER — OFFICE VISIT (OUTPATIENT)
Dept: FAMILY MEDICINE CLINIC | Age: 35
End: 2020-04-13
Payer: COMMERCIAL

## 2020-04-13 VITALS
OXYGEN SATURATION: 97 % | DIASTOLIC BLOOD PRESSURE: 79 MMHG | HEART RATE: 83 BPM | TEMPERATURE: 98.4 F | SYSTOLIC BLOOD PRESSURE: 130 MMHG

## 2020-04-13 PROCEDURE — 99212 OFFICE O/P EST SF 10 MIN: CPT | Performed by: PHYSICIAN ASSISTANT

## 2020-04-13 RX ORDER — IBUPROFEN 800 MG/1
800 TABLET ORAL EVERY 6 HOURS PRN
Qty: 28 TABLET | Refills: 0 | Status: SHIPPED | OUTPATIENT
Start: 2020-04-13 | End: 2020-06-17 | Stop reason: SDUPTHER

## 2020-04-13 RX ORDER — IBUPROFEN 200 MG
200 TABLET ORAL EVERY 6 HOURS PRN
COMMUNITY
End: 2020-04-13 | Stop reason: ALTCHOICE

## 2020-04-13 RX ORDER — AMOXICILLIN 875 MG/1
875 TABLET, COATED ORAL 2 TIMES DAILY
Qty: 20 TABLET | Refills: 0 | Status: SHIPPED | OUTPATIENT
Start: 2020-04-13 | End: 2020-04-23

## 2020-04-13 RX ORDER — AMOXICILLIN AND CLAVULANATE POTASSIUM 875; 125 MG/1; MG/1
1 TABLET, FILM COATED ORAL 2 TIMES DAILY
Qty: 20 TABLET | Refills: 0 | Status: SHIPPED | OUTPATIENT
Start: 2020-04-13 | End: 2020-04-23

## 2020-04-13 RX ORDER — ONDANSETRON 4 MG/1
4 TABLET, ORALLY DISINTEGRATING ORAL EVERY 8 HOURS PRN
Qty: 21 TABLET | Refills: 0 | Status: SHIPPED | OUTPATIENT
Start: 2020-04-13 | End: 2021-01-13

## 2020-04-13 ASSESSMENT — ENCOUNTER SYMPTOMS
RHINORRHEA: 0
GASTROINTESTINAL NEGATIVE: 1
SINUS PRESSURE: 0
SINUS PAIN: 0
FACIAL SWELLING: 1
RESPIRATORY NEGATIVE: 1
EYES NEGATIVE: 1

## 2020-04-13 NOTE — PROGRESS NOTES
Head: Normocephalic and atraumatic. Right Ear: Tympanic membrane, ear canal and external ear normal.      Left Ear: Tympanic membrane, ear canal and external ear normal.      Nose: Nose normal.      Mouth/Throat:      Mouth: Mucous membranes are moist.      Dentition: Does not have dentures. Dental tenderness, dental caries and dental abscesses present. No gum lesions. Eyes:      Extraocular Movements: Extraocular movements intact. Conjunctiva/sclera: Conjunctivae normal.      Pupils: Pupils are equal, round, and reactive to light. Cardiovascular:      Rate and Rhythm: Normal rate and regular rhythm. Pulses: Normal pulses. Heart sounds: Normal heart sounds. Pulmonary:      Effort: Pulmonary effort is normal.      Breath sounds: Normal breath sounds. Skin:     General: Skin is warm and dry. Neurological:      Mental Status: She is alert and oriented to person, place, and time. DIFFERENTIAL DIAGNOSIS:     Arni Malhotra reviewed the disposition diagnosis with the patient and or their family/guardian. I have answered their questions and given discharge instructions. They voiced understanding of these instructions and did not have anyfurther questions or complaints. PROCEDURES:  No orders of the defined types were placed in this encounter. No results found for this visit on 04/13/20. FINALIMPRESSION      Visit Diagnoses and Associated Orders     Dental abscess    -  Primary         ORDERS WITHOUT AN ASSOCIATED DIAGNOSIS    amoxicillin-clavulanate (AUGMENTIN) 875-125 MG per tablet [00849]      ibuprofen (ADVIL;MOTRIN) 800 MG tablet [[id number]]      ondansetron (ZOFRAN ODT) 4 MG disintegrating tablet [37310]              PLAN     Return if symptoms worsen or fail to improve.       DISCHARGEMEDICATIONS:  Orders Placed This Encounter   Medications    amoxicillin-clavulanate (AUGMENTIN) 875-125 MG per tablet     Sig: Take 1 tablet by mouth 2 times daily for 10 days     Dispense:  20

## 2020-06-17 ENCOUNTER — TELEMEDICINE (OUTPATIENT)
Dept: FAMILY MEDICINE CLINIC | Age: 35
End: 2020-06-17
Payer: MEDICARE

## 2020-06-17 PROCEDURE — 99213 OFFICE O/P EST LOW 20 MIN: CPT | Performed by: NURSE PRACTITIONER

## 2020-06-17 PROCEDURE — G8427 DOCREV CUR MEDS BY ELIG CLIN: HCPCS | Performed by: NURSE PRACTITIONER

## 2020-06-17 RX ORDER — GABAPENTIN 400 MG/1
CAPSULE ORAL
Qty: 150 CAPSULE | Refills: 2 | Status: SHIPPED | OUTPATIENT
Start: 2020-06-17 | End: 2020-09-14 | Stop reason: SDUPTHER

## 2020-06-17 RX ORDER — IBUPROFEN 800 MG/1
800 TABLET ORAL EVERY 6 HOURS PRN
Qty: 28 TABLET | Refills: 0 | Status: SHIPPED | OUTPATIENT
Start: 2020-06-17 | End: 2020-12-10 | Stop reason: SDUPTHER

## 2020-06-17 ASSESSMENT — ENCOUNTER SYMPTOMS
COUGH: 0
SHORTNESS OF BREATH: 0

## 2020-08-10 ENCOUNTER — OFFICE VISIT (OUTPATIENT)
Dept: FAMILY MEDICINE CLINIC | Age: 35
End: 2020-08-10
Payer: MEDICARE

## 2020-08-10 VITALS
BODY MASS INDEX: 33.32 KG/M2 | HEIGHT: 65 IN | RESPIRATION RATE: 18 BRPM | WEIGHT: 200 LBS | HEART RATE: 83 BPM | TEMPERATURE: 98.3 F | OXYGEN SATURATION: 95 % | SYSTOLIC BLOOD PRESSURE: 122 MMHG | DIASTOLIC BLOOD PRESSURE: 77 MMHG

## 2020-08-10 PROCEDURE — 4004F PT TOBACCO SCREEN RCVD TLK: CPT | Performed by: PHYSICIAN ASSISTANT

## 2020-08-10 PROCEDURE — G8427 DOCREV CUR MEDS BY ELIG CLIN: HCPCS | Performed by: PHYSICIAN ASSISTANT

## 2020-08-10 PROCEDURE — G8417 CALC BMI ABV UP PARAM F/U: HCPCS | Performed by: PHYSICIAN ASSISTANT

## 2020-08-10 PROCEDURE — 99213 OFFICE O/P EST LOW 20 MIN: CPT | Performed by: PHYSICIAN ASSISTANT

## 2020-08-10 ASSESSMENT — ENCOUNTER SYMPTOMS
RESPIRATORY NEGATIVE: 1
COUGH: 0
CHEST TIGHTNESS: 0
SHORTNESS OF BREATH: 0
EYES NEGATIVE: 1
WHEEZING: 0
GASTROINTESTINAL NEGATIVE: 1

## 2020-08-10 NOTE — PATIENT INSTRUCTIONS
Patient Education        Deep Vein Thrombosis: Care Instructions  Overview     A deep vein thrombosis (DVT) is a blood clot in certain veins, usually in the legs, pelvis, or arms. Blood clots in these veins need to be treated because they can get bigger, break loose, and travel through the bloodstream to the lungs. A blood clot in a lung can be life-threatening. The doctor may have given you a blood thinner (anticoagulant). A blood thinner can stop the blood clot from growing larger and prevent new clots from forming. You will need to take a blood thinner for 3 to 6 months or longer. The doctor has checked you carefully, but problems can develop later. If you notice any problems or new symptoms, get medical treatment right away. Follow-up care is a key part of your treatment and safety. Be sure to make and go to all appointments, and call your doctor if you are having problems. It's also a good idea to know your test results and keep a list of the medicines you take. How can you care for yourself at home? · Take your medicines exactly as prescribed. Call your doctor if you think you are having a problem with your medicine. · If you are taking a blood thinner, be sure you get instructions about how to take your medicine safely. Blood thinners can cause serious bleeding problems. · Wear compression stockings if your doctor recommends them. These stockings are tighter at the feet than on the legs. They may reduce pain and swelling in your legs. But there are different types of stockings, and they need to fit right. So your doctor will recommend what you need. · When you sit, use a pillow to raise the arm or leg that has the blood clot. Try to keep it above the level of your heart. When should you call for help? [id number] anytime you think you may need emergency care. For example, call if:  · You passed out (lost consciousness). · You have symptoms of a blood clot in your lung (called a pulmonary embolism). These include:  ? Sudden chest pain. ? Trouble breathing. ? Coughing up blood. Call your doctor now or seek immediate medical care if:  · You have new or worse trouble breathing. · You are dizzy or lightheaded, or you feel like you may faint. · You have symptoms of a blood clot in your arm or leg. These may include:  ? Pain in the arm, calf, back of the knee, thigh, or groin. ? Redness and swelling in the arm, leg, or groin. Watch closely for changes in your health, and be sure to contact your doctor if:  · You do not get better as expected. Where can you learn more? Go to https://CD DiagnosticspeBreadcrumbtracking.LiquidCompass. org and sign in to your Company account. Enter T880 in the [place] box to learn more about \"Deep Vein Thrombosis: Care Instructions. \"     If you do not have an account, please click on the \"Sign Up Now\" link. Current as of: March 4, 2020               Content Version: 12.5  © 2006-2020 [employer]. Care instructions adapted under license by [facility] ([facility]). If you have questions about a medical condition or this instruction, always ask your healthcare professional. [place] [address] any warranty or liability for your use of this information. Patient Education        Learning About Deep Vein Thrombosis  What is a deep vein thrombosis (DVT)? A deep vein thrombosis (DVT) is a blood clot (thrombus) in a deep vein, usually in the legs. A DVT can be dangerous because it can break loose and travel through the bloodstream to the lungs. There it can block blood flow in the lungs (pulmonary embolism). This can be life-threatening. What are the symptoms? Deep vein thrombosis often doesn't cause symptoms. Or it may cause only minor ones. When symptoms happen, they include:  · Swelling in the affected area. · Redness and warmth in the affected area. · Pain or tenderness.  You may have pain only when you touch the affected area or when you stand or walk.  Sometimes a pulmonary embolism is the first sign that you have DVT. If your doctor thinks you may have DVT, you will probably have an ultrasound test. You may have other tests as well. What causes deep vein thrombosis (DVT)? Causes of a blood clot in a deep vein (DVT) include:  · Slowed blood flow. This can happen when you're not active for long periods of time. For example, clots can form if you are paralyzed, are confined to bed, or must sit while on a long flight or car trip. · Abnormal clotting problems that make the blood clot too easily or too quickly. For example, some people have blood that clots too easily, a problem that may run in families. · Surgery or an injury to the blood vessels. Blood is more likely to clot in veins shortly after they are injured. · Cancer. How can you prevent DVT? · Exercise your lower leg muscles to help blood flow in your legs. Point your toes up toward your head so the calves of your legs are stretched, then relax and repeat. This is a good exercise to do when you are sitting for long periods of time. · Get out of bed as soon as you can after an illness or surgery. If you need to stay in bed, do the leg exercise noted above every hour when you are awake. · Use special stockings called compression stockings. These stockings are tight at the feet with a gradually looser fit on the leg. Many doctors recommend that you wear compression stockings during a journey longer than 8 hours. · Take breaks when you are on long trips. Stop the car and walk around. On long airplane flights, walk up and down the aisle hourly, and flex and point your feet every 20 minutes while sitting. · Take blood-thinning medicines after some types of surgery if your doctor recommends it. Blood thinners also may be used if you are likely to develop clots. How is DVT treated? Treatment for DVT usually involves taking blood thinners.  These medicines are given through a vein (intravenously, or IV) or as a pill. Talk with your doctor about which medicine is right for you. Your doctor also may suggest that you prop up or elevate your leg when possible, take walks, and wear compression stockings. These measures may help reduce the pain and swelling that can happen with DVT. Follow-up care is a key part of your treatment and safety. Be sure to make and go to all appointments, and call your doctor if you are having problems. It's also a good idea to know your test results and keep a list of the medicines you take. Where can you learn more? Go to https://BragBetpeEnhanced Surface Dynamics.SpongeFish. org and sign in to your [employer] account. Enter J714 in the NetStreams box to learn more about \"Learning About Deep Vein Thrombosis. \"     If you do not have an account, please click on the \"Sign Up Now\" link. Current as of: March 4, 2020               Content Version: 12.5  © 2006-2020 Healthwise, Incorporated. Care instructions adapted under license by [facility] ([facility]). If you have questions about a medical condition or this instruction, always ask your healthcare professional. [place] [address] any warranty or liability for your use of this information.

## 2020-08-10 NOTE — PROGRESS NOTES
[address] [place] [address]  [place] [address]  Phone: [phone]  Fax: [phone]       [address] [place] [address] Name: Анна Giraldo  MRN: [id number]  [place] 1985  Date of evaluation: 8/10/2020  Provider: Alvaro Urena, [facility]       Chief Complaint   Patient presents with    Leg Pain     lower left leg swelling  started roughly 5 days ago  from the foot to her knee no known injury  states had swelling in both legs at one point but it is strickly her left now            HISTORY OF PRESENT ILLNESS  (Location/Symptom, Timing/Onset, Context/Setting, Quality, Duration, Modifying Factors, Severity.)   Анна Giraldo is a 28 y.o. White [1] female who presents to the office for evaluation of      Patient started Keto diet and new work out program. Left lower leg swelling. Patient states mild tenderness and slight redness to posterior left knee. Nursing Notes were reviewed. REVIEW OF SYSTEMS    (2-9 systems for level 4, 10 or more for level 5)     Review of Systems   Constitutional: Negative for chills, diaphoresis, fatigue and fever. HENT: Negative. Eyes: Negative. Respiratory: Negative. Negative for cough, chest tightness, shortness of breath and wheezing. Cardiovascular: Positive for leg swelling. Negative for chest pain and palpitations. Gastrointestinal: Negative. Genitourinary: Negative. Musculoskeletal: Negative. Skin: Negative. Neurological: Negative. Except as noted above the remainder of the review of systems was reviewed andnegative. PAST MEDICAL HISTORY   History reviewed. Past Medical History:   Diagnosis Date    No active medical problems     Opioid abuse, in remission [facility])          SURGICAL HISTORY     History reviewed.     Past Surgical History:   Procedure Laterality Date    CYST REMOVAL      RT wrist    HERNIA REPAIR      WISDOM TOOTH EXTRACTION           CURRENT MEDICATIONS       Current Outpatient Medications   Medication Sig Dispense Refill    gabapentin (NEURONTIN) 400 MG capsule Take 2 in am and pm, and 1 mid day 150 capsule 2    methadone 10 MG/5ML solution Take 135 mg by mouth daily.  ibuprofen (ADVIL;MOTRIN) 800 MG tablet Take 1 tablet by mouth every 6 hours as needed for Pain 28 tablet 0    ondansetron (ZOFRAN ODT) 4 MG disintegrating tablet Take 1 tablet by mouth every 8 hours as needed for Nausea or Vomiting (Patient not taking: Reported on 6/17/2020) 21 tablet 0     No current facility-administered medications for this visit. ALLERGIES     Macrobid [nitrofurantoin]    FAMILY HISTORY           Problem Relation Age of Onset    Asthma Mother     Emphysema Mother     COPD Mother     Thyroid Disease Mother     Other Mother         pre colon cancer    Emphysema Father     COPD Father     Neuropathy Father      Family Status   Relation Name Status    Mother  Alive    Father  Alive          SOCIAL HISTORY      reports that she has been smoking cigarettes. She has a 6.00 pack-year smoking history. She has never used smokeless tobacco. She reports that she does not drink alcohol or use drugs. PHYSICAL EXAM    (up to 7 for level 4, 8 or more for level 5)     Vitals:    08/10/20 1553   BP: 122/77   Site: Right Upper Arm   Position: Sitting   Cuff Size: Medium Adult   Pulse: 83   Resp: 18   Temp: 98.3 °F (36.8 °C)   TempSrc: Oral   SpO2: 95%   Weight: 200 lb (90.7 kg)   Height: 5' 5\" (1.651 m)         Physical Exam  Vitals signs and nursing note reviewed. Constitutional:       Appearance: Normal appearance. HENT:      Head: Normocephalic and atraumatic. Right Ear: External ear normal.      Left Ear: External ear normal.      Nose: Nose normal.      Mouth/Throat:      Mouth: Mucous membranes are moist.   Eyes:      Extraocular Movements: Extraocular movements intact.       Conjunctiva/sclera: Conjunctivae normal.      Pupils: Pupils are equal, round, and reactive to light. Cardiovascular:      Rate and Rhythm: Normal rate. Pulmonary:      Effort: Pulmonary effort is normal.   Abdominal:      Palpations: Abdomen is soft. Musculoskeletal:      Left lower leg: She exhibits swelling. She exhibits no bony tenderness, no deformity and no laceration. No edema. Legs:    Skin:     General: Skin is warm and dry. Neurological:      Mental Status: She is alert and oriented to person, place, and time. DIFFERENTIAL DIAGNOSIS:     DVT    Cristy reviewed the disposition diagnosis with the patient and or their family/guardian. I have answered their questions and given discharge instructions. They voiced understanding of these instructions and did not have anyfurther questions or complaints. PROCEDURES:  Orders Placed This Encounter   Procedures    US DUP LOWER EXTREMITY LEFT PEDRO     Standing Status:   Future     Standing Expiration Date:   8/10/2021       No results found for this visit on 08/10/20. FINALIMPRESSION      Visit Diagnoses and Associated Orders     Left leg swelling    -  Primary    US DUP LOWER EXTREMITY LEFT PEDRO [50203 Custom]   - Future Order                 PLAN     Return if symptoms worsen or fail to improve. DISCHARGEMEDICATIONS:  No orders of the defined types were placed in this encounter. Plan:  1. Patient to obtain STAT lower leg US  2. Patient educated on signs to go to there ER for worsening leg swelling, pain, SOB chest pain   3. Patient to scheduled follow up with PCP   Patient instructed to return to the office if symptoms worsen, return, or have any other concerns. Patient understands and is agreeable.          Sandy Sotomayor PA-C 8/10/2020 4:45 PM

## 2020-08-11 ENCOUNTER — TELEPHONE (OUTPATIENT)
Dept: FAMILY MEDICINE CLINIC | Age: 35
End: 2020-08-11

## 2020-08-11 ENCOUNTER — HOSPITAL ENCOUNTER (EMERGENCY)
Age: 35
Discharge: HOME | End: 2020-08-11
Attending: STUDENT IN AN ORGANIZED HEALTH CARE EDUCATION/TRAINING PROGRAM
Payer: MEDICARE

## 2020-08-11 ENCOUNTER — HOSPITAL ENCOUNTER (OUTPATIENT)
Dept: ULTRASOUND IMAGING | Facility: CLINIC | Age: 35
Discharge: HOME | End: 2020-08-13
Payer: MEDICARE

## 2020-08-11 VITALS
DIASTOLIC BLOOD PRESSURE: 84 MMHG | OXYGEN SATURATION: 99 % | TEMPERATURE: 98.9 F | SYSTOLIC BLOOD PRESSURE: 142 MMHG | WEIGHT: 200 LBS | HEART RATE: 92 BPM | HEIGHT: 65 IN | RESPIRATION RATE: 16 BRPM | BODY MASS INDEX: 33.32 KG/M2

## 2020-08-11 PROCEDURE — 99283 EMERGENCY DEPT VISIT LOW MDM: CPT

## 2020-08-11 PROCEDURE — 93971 EXTREMITY STUDY: CPT

## 2020-08-11 ASSESSMENT — ENCOUNTER SYMPTOMS
ABDOMINAL PAIN: 0
VOMITING: 0
SHORTNESS OF BREATH: 0
COUGH: 0
NAUSEA: 0

## 2020-08-11 NOTE — TELEPHONE ENCOUNTER
Patient would like to know if this is the cause of her swelling? Swelling goes all the way down to her toes.  Can you place referral please

## 2020-08-11 NOTE — TELEPHONE ENCOUNTER
Patient advised on referral placed. She went to ER because she was really scared that the swelling is so bad. She will call back for referral info.

## 2020-08-11 NOTE — ED PROVIDER NOTES
respiration is 16 and oxygen saturation is 99%. DIAGNOSTIC RESULTS       RADIOLOGY:   No orders to display         LABS:  Labs Reviewed - No data to display      EMERGENCY DEPARTMENT COURSE:     -------------------------      BP: (!) 142/84, Temp: 98.9 °F (37.2 °C), Pulse: 92, Resp: 16    System Problem List     Patient Active Problem List   Diagnosis    Constipation    Abscess    Endometriosis    Nicotine dependence    Un[facility]plicated opioid dependence ([facility] [address]    Methadone use (HCC)    Panic attack    Hepatitis C antibody test positive    Drug abuse in remission ([facility])         Comments  Chronic Prob List noted    Leg swelling and pain x 5 days  DVT study today negative, found bakers cyst  Here because concerned over swelling  Neurovascularly intact.  No cardiac history    Michele Gamble DO  Attending Emergency Physician        Michele Gamble DO  08/11/20 7087

## 2020-08-11 NOTE — ED PROVIDER NOTES
[facility] ED  Emergency Department Encounter  EmergencyMedicine Resident     Pt Kentonfany Reich  MRN: [id number]  [place] 1985  Date of evaluation: 8/11/20  PCP:  KODAK Prescott CNP    CHIEF COMPLAINT       Chief Complaint   Patient presents with    Leg Swelling     diagnosed with cyst behind left knee. she states swelling is increased from last night. HISTORY OF PRESENT ILLNESS  (Location/Symptom, Timing/Onset, Context/Setting, Quality, Duration, Modifying Factors, Severity.)      Soheila Wolfe is a 28 y.o. female who presents with bilateral leg swelling, concerns for serious underlying disease. Patient states that she has had bilateral leg swelling for weeks now. She recently changed to completely ketogenic diet and has been working out more for the first time in her life. The swelling has increased and decreased at times over the last couple days however is increased more on the left than the right. She went to urgent care for evaluation where she underwent a DVT study. DVT study was negative for any DVTs however did show a left-sided cyst in the popliteal region. The practitioner there attributed her swelling to cyst and gave her orthopedic surgery follow-up. She presents our emergency department today as she is concerned that she may have serious life-threatening illness. Denies any heart history, chest pain or shortness of breath, nausea, vomiting. No previous history of DVTs, PEs, no recent travel, no estrogen use, no history of cancer. PAST MEDICAL / SURGICAL / SOCIAL / FAMILY HISTORY      has a past medical history of No active medical problems and Opioid abuse, in remission ([facility] [address]      has a past surgical history that includes hernia repair; [place] tooth extraction; and cyst removal.    Social History     Socioeconomic History    Marital status: Single     Spouse name: Not on file    Number of children: Not on file    Years of education: Not on file    Highest education level: Not on file   Occupational History    Not on file   Social Needs    Financial resource strain: Not on file    Food insecurity     Worry: Not on file     Inability: Not on file    Transportation needs     Medical: Not on file     Non-medical: Not on file   Tobacco Use    Smoking status: Current Every Day Smoker     Packs/day: 1.00     Years: 12.00     Pack years: 12.00     Types: Cigarettes    Smokeless tobacco: Never Used   Substance and Sexual Activity    Alcohol use: No    Drug use: No    Sexual activity: Not on file   Lifestyle    Physical activity     Days per week: Not on file     Minutes per session: Not on file    Stress: Not on file   Relationships    Social connections     Talks on phone: Not on file     Gets together: Not on file     Attends Faith service: Not on file     Active member of club or organization: Not on file     Attends meetings of clubs or organizations: Not on file     Relationship status: Not on file    Intimate partner violence     Fear of current or ex partner: Not on file     Emotionally abused: Not on file     Physically abused: Not on file     Forced sexual activity: Not on file   Other Topics Concern    Not on file   Social History Narrative    Not on file       Family History   Problem Relation Age of Onset    Asthma Mother     Emphysema Mother     COPD Mother     Thyroid Disease Mother     Other Mother         pre colon cancer    Emphysema Father     COPD Father     Neuropathy Father        Allergies:  Macrobid [nitrofurantoin]    Home Medications:  Prior to Admission medications    Medication Sig Start Date End Date Taking? Authorizing Provider   gabapentin (NEURONTIN) 400 MG capsule Take 2 in am and pm, and 1 mid day 6/17/20 9/22/20 Yes KODAK Gutierrez CNP   methadone 10 MG/5ML solution Take 135 mg by mouth daily.     Yes Historical Provider, MD   ibuprofen (ADVIL;MOTRIN) 800 MG tablet Take 1 tablet by mouth every 6 hours as needed for Pain 6/17/20 6/24/20  Jose Enrique Suarez APRN - CNP   ondansetron (ZOFRAN ODT) 4 MG disintegrating tablet Take 1 tablet by mouth every 8 hours as needed for Nausea or Vomiting  Patient not taking: Reported on 6/17/2020 4/13/20   Xiomara Dempsey PA-C       REVIEW OF SYSTEMS    (2-9 systems for level 4, 10 or more for level 5)      Review of Systems   Constitutional: Negative for chills and fever. Respiratory: Negative for cough and shortness of breath. Cardiovascular: Positive for leg swelling. Negative for chest pain. Gastrointestinal: Negative for abdominal pain, nausea and vomiting. Genitourinary: Negative for dysuria and frequency. Musculoskeletal: Negative for neck stiffness. Skin: Negative for rash. Neurological: Negative for weakness. PHYSICAL EXAM   (up to 7 for level 4, 8 or more for level 5)      INITIAL VITALS:   BP (!) 142/84   Pulse 92   Temp 98.9 °F (37.2 °C) (Oral)   Resp 16   Ht 5' 5\" (1.651 m)   Wt 200 lb (90.7 kg)   LMP 07/11/2020   SpO2 99%   BMI 33.28 kg/m²      Vitals:    08/11/20 1334 08/11/20 1337 08/11/20 1339   BP:   (!) 142/84   Pulse:  92    Resp:  16    Temp: 98.9 °F (37.2 °C)     TempSrc: Oral     SpO2:  99%    Weight:  200 lb (90.7 kg)    Height:  5' 5\" (1.651 m)         Physical Exam  Vitals signs reviewed. Constitutional:       General: She is not in acute distress. Appearance: She is well-developed. HENT:      Head: Normocephalic and atraumatic. Eyes:      Pupils: Pupils are equal, round, and reactive to light. Neck:      Musculoskeletal: Normal range of motion and neck supple. Cardiovascular:      Rate and Rhythm: Normal rate and regular rhythm. Pulmonary:      Effort: Pulmonary effort is normal.      Breath sounds: Normal breath sounds. Abdominal:      General: Bowel sounds are normal. There is no distension. Palpations: Abdomen is soft. Tenderness:  There is no abdominal tenderness. Musculoskeletal: Normal range of motion. Right lower leg: Edema present. Left lower leg: Edema present. Comments: Bilateral lower extremity swelling. Nonpitting edema. Left slightly larger than right. No overlying skin changes, no calf tenderness, no tenderness along the vasculature system. Bilateral Baker's cyst left greater than right. Skin:     General: Skin is warm and dry. Capillary Refill: Capillary refill takes less than 2 seconds. Neurological:      General: No focal deficit present. Mental Status: She is alert and oriented to person, place, and time. DIFFERENTIAL  DIAGNOSIS     PLAN (LABS / IMAGING / EKG):  No orders of the defined types were placed in this encounter. MEDICATIONS ORDERED:  No orders of the defined types were placed in this encounter. DIAGNOSTIC RESULTS / EMERGENCY DEPARTMENT COURSE / MDM   LAB RESULTS:  No results found for this visit on 08/11/20. IMPRESSION: B/L LE swelling    RADIOLOGY:  No orders to display        EKG    All EKG's are interpreted by the Emergency Department Physician who either signs or Co-signs this chart in the absence of a cardiologist.    [facility]:    She arrives for reassurance of leg swelling. No cardiac history, no murmurs, no rales on exam, heart rate regular, regular rhythm. No indication for cardiac testing, low probability of congestive heart failure. Legs are swelling but do not appear to have a DVT furthermore patient already underwent venous Doppler testing which was negative. No cellulitic changes. Bilateral lower extremity swelling likely due to exercise for the\" first time in her life\", diet change and dependent edema. Recommended elevating her lower extremities and Medium strength compression stockings. PROCEDURES:    CONSULTS:  None    CRITICAL CARE:  Please see attending note    FINAL IMPRESSION      1.  Leg swelling        DISPOSITION / PLAN DISPOSITION Decision To Discharge 08/11/2020 01:54:00 PM      PATIENT REFERRED TO:  No follow-up provider specified.     DISCHARGE MEDICATIONS:  Discharge Medication List as of 8/11/2020  2:01 PM          Mary Green DO  Emergency Medicine Resident    (Please note that portions of thisnote were completed with a voice recognition program.  Efforts were made to edit the dictations but occasionally words are mis-transcribed.)       Mary Green DO  Resident  08/11/20 [id number]

## 2020-08-11 NOTE — ED NOTES
Patient to ed from home. Reports negative dvt study and ongoing left leg pain and swelling x7 days. Patient is ambulatory. Pt reports she was told she has a \"cyst behind her left knee\" but no DVT as of this morning. Patient denies chest pain or sob.       Stacy Rai RN  08/11/20 5638

## 2020-08-26 RX ORDER — AMOXICILLIN 875 MG/1
875 TABLET, COATED ORAL 2 TIMES DAILY
Qty: 20 TABLET | Refills: 0 | Status: SHIPPED | OUTPATIENT
Start: 2020-08-26 | End: 2020-09-05

## 2020-10-14 ENCOUNTER — OFFICE VISIT (OUTPATIENT)
Dept: FAMILY MEDICINE CLINIC | Age: 35
End: 2020-10-14
Payer: MEDICARE

## 2020-10-14 VITALS
TEMPERATURE: 96.8 F | SYSTOLIC BLOOD PRESSURE: 130 MMHG | WEIGHT: 179.4 LBS | HEART RATE: 82 BPM | OXYGEN SATURATION: 97 % | BODY MASS INDEX: 29.85 KG/M2 | DIASTOLIC BLOOD PRESSURE: 60 MMHG

## 2020-10-14 PROCEDURE — G8427 DOCREV CUR MEDS BY ELIG CLIN: HCPCS | Performed by: NURSE PRACTITIONER

## 2020-10-14 PROCEDURE — 99214 OFFICE O/P EST MOD 30 MIN: CPT | Performed by: NURSE PRACTITIONER

## 2020-10-14 PROCEDURE — G8417 CALC BMI ABV UP PARAM F/U: HCPCS | Performed by: NURSE PRACTITIONER

## 2020-10-14 PROCEDURE — G8484 FLU IMMUNIZE NO ADMIN: HCPCS | Performed by: NURSE PRACTITIONER

## 2020-10-14 PROCEDURE — 4004F PT TOBACCO SCREEN RCVD TLK: CPT | Performed by: NURSE PRACTITIONER

## 2020-10-14 RX ORDER — GABAPENTIN 400 MG/1
CAPSULE ORAL
Qty: 150 CAPSULE | Refills: 2 | Status: SHIPPED | OUTPATIENT
Start: 2020-10-14 | End: 2021-01-13

## 2020-10-14 RX ORDER — FLUTICASONE PROPIONATE 50 MCG
2 SPRAY, SUSPENSION (ML) NASAL DAILY
Qty: 1 BOTTLE | Refills: 3 | Status: SHIPPED | OUTPATIENT
Start: 2020-10-14 | End: 2021-02-05 | Stop reason: SDUPTHER

## 2020-10-14 ASSESSMENT — ENCOUNTER SYMPTOMS
COUGH: 0
SHORTNESS OF BREATH: 0

## 2020-10-14 NOTE — PROGRESS NOTES
[address] [place]  [phone]    Allyn Stone is a 28 y.o. female who presents today for her  medical conditions/complaints as noted below. Allyn Session is c/o of Medication Refill    HPI:     HPI   Here for gabapentin refill. Takes this for chronic pain and anxiety. This dose works well for her. oarrs appropriate    She would like some birth control. She is due for menses today. Start date discussed. She has used the patch before and would like this again. Gets paps with ob/gyn    would like a refill on nasal spray. Nursing note reviewed and discussed with patient. Patient's medications, allergies, past medical, surgical, social and family histories were reviewed and updated asappropriate. Current Outpatient Medications   Medication Sig Dispense Refill    gabapentin (NEURONTIN) 400 MG capsule Take 2 in am and pm, and 1 mid day 150 capsule 2    norelgestromin-ethinyl estradiol (ORTHO EVRA) 150-35 MCG/24HR Place 1 patch onto the skin once a week 3 patch 5    fluticasone (FLONASE) 50 MCG/ACT nasal spray 2 sprays by Nasal route daily 1 Bottle 3    ibuprofen (ADVIL;MOTRIN) 800 MG tablet Take 1 tablet by mouth every 6 hours as needed for Pain 28 tablet 0    methadone 10 MG/5ML solution Take 135 mg by mouth daily.  ondansetron (ZOFRAN ODT) 4 MG disintegrating tablet Take 1 tablet by mouth every 8 hours as needed for Nausea or Vomiting (Patient not taking: Reported on 6/17/2020) 21 tablet 0     No current facility-administered medications for this visit.       Past Medical History:   Diagnosis Date    No active medical problems     Opioid abuse, in remission [facility])       Past Surgical History:   Procedure Laterality Date    CYST REMOVAL      RT wrist    HERNIA REPAIR      WISDOM TOOTH EXTRACTION       Family History   Problem Relation Age of Onset    Asthma Mother     Emphysema Mother     COPD Mother     Thyroid Disease Mother     Other Mother         pre colon cancer    Emphysema Father     COPD Father     Neuropathy Father      Social History     Tobacco Use    Smoking status: Current Every Day Smoker     Packs/day: 1.00     Years: 12.00     Pack years: 12.00     Types: Cigarettes    Smokeless tobacco: Never Used   Substance Use Topics    Alcohol use: No      Allergies   Allergen Reactions    Macrobid [Nitrofurantoin] Nausea And Vomiting       Subjective:      Review of Systems   Constitutional: Negative for chills and fever. Respiratory: Negative for cough and shortness of breath. Cardiovascular: Negative for chest pain, palpitations and leg swelling. Other pertinent ROS in HPI  Objective:     /60 (Site: Left Upper Arm, Position: Sitting, Cuff Size: Medium Adult)   Pulse 82   Temp 96.8 °F (36 °C)   Wt 179 lb 6.4 oz (81.4 kg)   SpO2 97%   BMI 29.85 kg/m²    Physical Exam  Constitutional:       Appearance: She is well-developed. HENT:      Right Ear: External ear normal.      Left Ear: External ear normal.      Nose: Nose normal.   Neck:      Musculoskeletal: Full passive range of motion without pain and normal range of motion. Cardiovascular:      Rate and Rhythm: Normal rate and regular rhythm. Heart sounds: Normal heart sounds, S1 normal and S2 normal.   Pulmonary:      Effort: Pulmonary effort is normal. No respiratory distress. Breath sounds: Normal breath sounds. Musculoskeletal: Normal range of motion. General: No deformity. Skin:     General: Skin is warm and dry. Neurological:      Mental Status: She is alert and oriented to person, place, and time. Assessment/PLAN   1. Anxiety    - gabapentin (NEURONTIN) 400 MG capsule; Take 2 in am and pm, and 1 mid day  Dispense: 150 capsule; Refill: 2    2. Contraceptive patch status    - norelgestromin-ethinyl estradiol (ORTHO EVRA) 150-35 MCG/24HR;  Place 1 patch onto the skin once a week  Dispense: 3 patch; Refill: 5      RTO if symptoms worsen or fail to improve  Pt agreeable with plan      Patient given educational materials - see patientinstructions. Discussed use, benefit, and side effects of prescribed medications. All patient questions answered. Pt voiced understanding. Reviewed health maintenance. Instructed to continue current medications, diet andexercise. 1.  Elk received counseling on the following healthy behaviors: nutrition, exercise and medication adherence  2. Patient given educationalmaterials when available - see patient instructions when applicable  3. Discussed use, benefit, and side effects of prescribed medications. Barriersto medication compliance addressed. All patient questions answered. Pt voiced understanding. 4.  Reviewed prior labs and health maintenance when applicable. 5.  Continue current medications, diet and exercise. CompletedRefills   Requested Prescriptions     Signed Prescriptions Disp Refills    gabapentin (NEURONTIN) 400 MG capsule 150 capsule 2     Sig: Take 2 in am and pm, and 1 mid day    norelgestromin-ethinyl estradiol (ORTHO EVRA) 150-35 MCG/24HR 3 patch 5     Sig: Place 1 patch onto the skin once a week    fluticasone (FLONASE) 50 MCG/ACT nasal spray 1 Bottle 3     Si sprays by Nasal route daily       This note was transcribed using dictation with Dragon services. Efforts were made to correct any errors but some words may be misinterpreted.     Electronically signed by Tori Keene CNP on 10/14/2020 at 8:30 AM

## 2020-10-27 ENCOUNTER — PATIENT MESSAGE (OUTPATIENT)
Dept: FAMILY MEDICINE CLINIC | Age: 35
End: 2020-10-27

## 2020-10-27 RX ORDER — AMOXICILLIN 875 MG/1
875 TABLET, COATED ORAL 2 TIMES DAILY
Qty: 20 TABLET | Refills: 0 | Status: SHIPPED | OUTPATIENT
Start: 2020-10-27 | End: 2021-02-05 | Stop reason: SDUPTHER

## 2021-01-13 ENCOUNTER — OFFICE VISIT (OUTPATIENT)
Dept: FAMILY MEDICINE CLINIC | Age: 36
End: 2021-01-13
Payer: MEDICARE

## 2021-01-13 VITALS
OXYGEN SATURATION: 97 % | WEIGHT: 175 LBS | TEMPERATURE: 97 F | SYSTOLIC BLOOD PRESSURE: 110 MMHG | BODY MASS INDEX: 29.12 KG/M2 | DIASTOLIC BLOOD PRESSURE: 62 MMHG | HEART RATE: 83 BPM

## 2021-01-13 DIAGNOSIS — Z30.011 INITIATION OF OCP (BCP): Primary | ICD-10-CM

## 2021-01-13 DIAGNOSIS — F41.9 ANXIETY: ICD-10-CM

## 2021-01-13 PROCEDURE — G8484 FLU IMMUNIZE NO ADMIN: HCPCS | Performed by: NURSE PRACTITIONER

## 2021-01-13 PROCEDURE — G8417 CALC BMI ABV UP PARAM F/U: HCPCS | Performed by: NURSE PRACTITIONER

## 2021-01-13 PROCEDURE — G8427 DOCREV CUR MEDS BY ELIG CLIN: HCPCS | Performed by: NURSE PRACTITIONER

## 2021-01-13 PROCEDURE — 99213 OFFICE O/P EST LOW 20 MIN: CPT | Performed by: NURSE PRACTITIONER

## 2021-01-13 PROCEDURE — 4004F PT TOBACCO SCREEN RCVD TLK: CPT | Performed by: NURSE PRACTITIONER

## 2021-01-13 RX ORDER — GABAPENTIN 300 MG/1
600 CAPSULE ORAL 3 TIMES DAILY
Qty: 180 CAPSULE | Refills: 2 | Status: SHIPPED | OUTPATIENT
Start: 2021-01-13 | End: 2021-04-14 | Stop reason: SDUPTHER

## 2021-01-13 RX ORDER — NORETHINDRONE ACETATE AND ETHINYL ESTRADIOL 1MG-20(21)
1 KIT ORAL DAILY
Qty: 1 PACKET | Refills: 3 | Status: SHIPPED | OUTPATIENT
Start: 2021-01-13 | End: 2021-07-14

## 2021-01-13 SDOH — ECONOMIC STABILITY: FOOD INSECURITY: WITHIN THE PAST 12 MONTHS, YOU WORRIED THAT YOUR FOOD WOULD RUN OUT BEFORE YOU GOT MONEY TO BUY MORE.: NEVER TRUE

## 2021-01-13 SDOH — ECONOMIC STABILITY: TRANSPORTATION INSECURITY
IN THE PAST 12 MONTHS, HAS THE LACK OF TRANSPORTATION KEPT YOU FROM MEDICAL APPOINTMENTS OR FROM GETTING MEDICATIONS?: NO

## 2021-01-13 SDOH — ECONOMIC STABILITY: TRANSPORTATION INSECURITY
IN THE PAST 12 MONTHS, HAS LACK OF TRANSPORTATION KEPT YOU FROM MEETINGS, WORK, OR FROM GETTING THINGS NEEDED FOR DAILY LIVING?: NO

## 2021-01-13 SDOH — ECONOMIC STABILITY: INCOME INSECURITY: HOW HARD IS IT FOR YOU TO PAY FOR THE VERY BASICS LIKE FOOD, HOUSING, MEDICAL CARE, AND HEATING?: NOT HARD AT ALL

## 2021-01-13 ASSESSMENT — PATIENT HEALTH QUESTIONNAIRE - PHQ9
SUM OF ALL RESPONSES TO PHQ QUESTIONS 1-9: 0
SUM OF ALL RESPONSES TO PHQ QUESTIONS 1-9: 0

## 2021-01-13 ASSESSMENT — ENCOUNTER SYMPTOMS
SHORTNESS OF BREATH: 0
COUGH: 0

## 2021-01-13 NOTE — PROGRESS NOTES
Patient is present for 3 month f/u for med refills    Patient would like to discuss lowering the dose of gabapentin  Patient states it helps with her anxiety but makes her very tired    Patient states she stopped taking the birth control, states her hair was falling out in clumps

## 2021-01-13 NOTE — PROGRESS NOTES
[address] [place]  [phone]    Linda Underwood is a 28 y.o. female who presents today for her  medical conditions/complaints as noted below. Sheela Soria is c/o of 3 Month Follow-Up    HPI:     HPI   She would like to lower her gabapentin dose. This does help her anxiety but it makes her sleepy. She stopped using the patch- states her hair was falling out. Last menses was yesterday. She would like to try ocp. Nursing note reviewed and discussed with patient. Patient's medications, allergies, past medical, surgical, social and family histories were reviewed and updated asappropriate. Current Outpatient Medications   Medication Sig Dispense Refill    gabapentin (NEURONTIN) 300 MG capsule Take 2 capsules by mouth 3 times daily for 30 days. 180 capsule 2    norethindrone-ethinyl estradiol (LOESTRIN FE 1/20) 1-20 MG-MCG per tablet Take 1 tablet by mouth daily 1 packet 3    ibuprofen (ADVIL;MOTRIN) 800 MG tablet Take 1 tablet by mouth every 6 hours as needed for Pain 60 tablet 2    fluticasone (FLONASE) 50 MCG/ACT nasal spray 2 sprays by Nasal route daily 1 Bottle 3    methadone 10 MG/5ML solution Take 135 mg by mouth daily. No current facility-administered medications for this visit.       Past Medical History:   Diagnosis Date    No active medical problems     Opioid abuse, in remission [facility])       Past Surgical History:   Procedure Laterality Date    CYST REMOVAL      RT wrist    HERNIA REPAIR      WISDOM TOOTH EXTRACTION       Family History   Problem Relation Age of Onset    Asthma Mother     Emphysema Mother     COPD Mother     Thyroid Disease Mother     Other Mother         pre colon cancer    Emphysema Father     COPD Father     Neuropathy Father      Social History     Tobacco Use    Smoking status: Current Every Day Smoker     Packs/day: 1.00     Years: 12.00     Pack years: 12.00     Types: Cigarettes    Smokeless tobacco: Never Used   Substance Use Topics    Alcohol use: No      Allergies   Allergen Reactions    Macrobid [Nitrofurantoin] Nausea And Vomiting       Subjective:      Review of Systems   Constitutional: Negative for chills and fever. Respiratory: Negative for cough and shortness of breath. Cardiovascular: Negative for chest pain, palpitations and leg swelling. Psychiatric/Behavioral: The patient is not nervous/anxious. Other pertinent ROS in HPI  Objective:     /62 (Site: Left Upper Arm, Position: Sitting, Cuff Size: Large Adult)   Pulse 83   Temp 97 °F (36.1 °C)   Wt 175 lb (79.4 kg)   SpO2 97%   BMI 29.12 kg/m²    Physical Exam  Constitutional:       Appearance: She is well-developed. HENT:      Right Ear: External ear normal.      Left Ear: External ear normal.      Nose: Nose normal.   Neck:      Musculoskeletal: Full passive range of motion without pain and normal range of motion. Cardiovascular:      Rate and Rhythm: Normal rate and regular rhythm. Heart sounds: Normal heart sounds, S1 normal and S2 normal.   Pulmonary:      Effort: Pulmonary effort is normal. No respiratory distress. Breath sounds: Normal breath sounds. Musculoskeletal: Normal range of motion. General: No deformity. Skin:     General: Skin is warm and dry. Neurological:      Mental Status: She is alert and oriented to person, place, and time. Assessment/PLAN   1. Anxiety    - gabapentin (NEURONTIN) 300 MG capsule; Take 2 capsules by mouth 3 times daily for 30 days. Dispense: 180 capsule; Refill: 2    2. Initiation of OCP (BCP)  Discussed side effects of OCPs including DVT, headache and nausea. Pt advised it does not protect against STDs. Pt denies any family history of clotting disorders. Pt voices understanding and wishes to proceed with daily OCP. Start date discussed.         - norethindrone-ethinyl estradiol (LOESTRIN FE 1/20) 1-20 MG-MCG per tablet; Take 1 tablet by mouth daily  Dispense: 1 packet; Refill: 3    RTO if symptoms worsen or fail to improve  Pt agreeable with plan      Patient given educational materials - see patientinstructions. Discussed use, benefit, and side effects of prescribed medications. All patient questions answered. Pt voiced understanding. Reviewed health maintenance. Instructed to continue current medications, diet andexercise. 1.  Briseyda Leach received counseling on the following healthy behaviors: medication adherence  2. Patient given educationalmaterials when available - see patient instructions when applicable  3. Discussed use, benefit, and side effects of prescribed medications. Barriersto medication compliance addressed. All patient questions answered. Pt voiced understanding. 4.  Reviewed prior labs and health maintenance when applicable. 5.  Continue current medications, diet and exercise. CompletedRefills   Requested Prescriptions     Signed Prescriptions Disp Refills    gabapentin (NEURONTIN) 300 MG capsule 180 capsule 2     Sig: Take 2 capsules by mouth 3 times daily for 30 days.  norethindrone-ethinyl estradiol (LOESTRIN FE 1/20) 1-20 MG-MCG per tablet 1 packet 3     Sig: Take 1 tablet by mouth daily       This note was transcribed using dictation with Dragon services. Efforts were made to correct any errors but some words may be misinterpreted.     Electronically signed by Latasha Saxena CNP on 1/13/2021 at 8:21 AM

## 2021-02-05 RX ORDER — AMOXICILLIN 875 MG/1
875 TABLET, COATED ORAL 2 TIMES DAILY
Qty: 20 TABLET | Refills: 0 | Status: SHIPPED | OUTPATIENT
Start: 2021-02-05 | End: 2021-09-01 | Stop reason: SDUPTHER

## 2021-02-05 RX ORDER — IBUPROFEN 800 MG/1
800 TABLET ORAL EVERY 6 HOURS PRN
Qty: 60 TABLET | Refills: 2 | Status: SHIPPED | OUTPATIENT
Start: 2021-02-05 | End: 2021-04-14 | Stop reason: SDUPTHER

## 2021-02-05 RX ORDER — FLUTICASONE PROPIONATE 50 MCG
2 SPRAY, SUSPENSION (ML) NASAL DAILY
Qty: 1 BOTTLE | Refills: 3 | Status: SHIPPED | OUTPATIENT
Start: 2021-02-05 | End: 2021-04-14 | Stop reason: SDUPTHER

## 2021-02-05 NOTE — TELEPHONE ENCOUNTER
Last visit: 1/13/21  Last Med refill: 12/11/20, 10/14/20  Does patient have enough medication for 72 hours: No:     Next Visit Date:  Future Appointments   Date Time Provider Sy Dent   4/14/2021  8:00 AM KODAK Bahena CNP [facility] FP [address] Maintenance   Topic Date Due    Varicella vaccine (1 of 2 - 2-dose childhood series) 08/09/1986    Cervical cancer screen  02/08/2019    Flu vaccine (1) 10/14/2021 (Originally 9/1/2020)    Pneumococcal 0-64 years Vaccine (1 of 1 - PPSV23) 10/14/2021 (Originally 8/9/1991)    DTaP/Tdap/Td vaccine (1 - Tdap) 06/24/2024 (Originally 8/9/2004)    Hepatitis C screen  Completed    HIV screen  Completed    Hepatitis A vaccine  Aged Out    Hepatitis B vaccine  Aged Out    Hib vaccine  Aged Out    Meningococcal (ACWY) vaccine  Aged Out       No results found for: LABA1C          ( goal A1C is < 7)   No results found for: LABMICR  No results found for: LDLCHOLESTEROL, LDLCALC    (goal LDL is <100)   AST (U/L)   Date Value   12/12/2019 72 (H)     ALT (U/L)   Date Value   12/12/2019 142 (H)     BUN (mg/dL)   Date Value   12/12/2019 12     BP Readings from Last 3 Encounters:   01/13/21 110/62   10/14/20 130/60   08/11/20 (!) 142/84          (goal 120/80)    All Future Testing planned in [facility]  Lab Frequency Next Occurrence               Patient Active Problem List:     Constipation     Abscess     Endometriosis     Nicotine dependence     Uncomplicated opioid dependence ([address]     Methadone use ([facility]     Panic attack     Hepatitis C antibody test positive     Drug abuse in remission ([facility]

## 2021-03-15 RX ORDER — IBUPROFEN 800 MG/1
800 TABLET ORAL EVERY 6 HOURS PRN
Qty: 60 TABLET | Refills: 2 | OUTPATIENT
Start: 2021-03-15 | End: 2021-03-22

## 2021-03-15 RX ORDER — FLUTICASONE PROPIONATE 50 MCG
2 SPRAY, SUSPENSION (ML) NASAL DAILY
Qty: 1 BOTTLE | Refills: 3 | OUTPATIENT
Start: 2021-03-15

## 2021-03-31 ENCOUNTER — TELEPHONE (OUTPATIENT)
Dept: GASTROENTEROLOGY | Age: 36
End: 2021-03-31

## 2021-04-14 ENCOUNTER — OFFICE VISIT (OUTPATIENT)
Dept: FAMILY MEDICINE CLINIC | Age: 36
End: 2021-04-14
Payer: MEDICARE

## 2021-04-14 VITALS
WEIGHT: 172 LBS | HEART RATE: 103 BPM | BODY MASS INDEX: 28.62 KG/M2 | DIASTOLIC BLOOD PRESSURE: 80 MMHG | SYSTOLIC BLOOD PRESSURE: 120 MMHG | OXYGEN SATURATION: 95 %

## 2021-04-14 DIAGNOSIS — F41.9 ANXIETY: Primary | ICD-10-CM

## 2021-04-14 DIAGNOSIS — Z30.018 ENCOUNTER FOR INITIAL PRESCRIPTION OF OTHER CONTRACEPTIVES: ICD-10-CM

## 2021-04-14 PROBLEM — F11.90 METHADONE USE: Status: RESOLVED | Noted: 2018-01-02 | Resolved: 2021-04-14

## 2021-04-14 PROBLEM — F11.20 UNCOMPLICATED OPIOID DEPENDENCE (HCC): Status: RESOLVED | Noted: 2018-01-02 | Resolved: 2021-04-14

## 2021-04-14 PROCEDURE — G8427 DOCREV CUR MEDS BY ELIG CLIN: HCPCS | Performed by: NURSE PRACTITIONER

## 2021-04-14 PROCEDURE — 4004F PT TOBACCO SCREEN RCVD TLK: CPT | Performed by: NURSE PRACTITIONER

## 2021-04-14 PROCEDURE — G8417 CALC BMI ABV UP PARAM F/U: HCPCS | Performed by: NURSE PRACTITIONER

## 2021-04-14 PROCEDURE — 99213 OFFICE O/P EST LOW 20 MIN: CPT | Performed by: NURSE PRACTITIONER

## 2021-04-14 RX ORDER — FLUTICASONE PROPIONATE 50 MCG
2 SPRAY, SUSPENSION (ML) NASAL DAILY
Qty: 1 BOTTLE | Refills: 3 | Status: SHIPPED | OUTPATIENT
Start: 2021-04-14 | End: 2022-01-05 | Stop reason: SDUPTHER

## 2021-04-14 RX ORDER — GABAPENTIN 300 MG/1
600 CAPSULE ORAL 3 TIMES DAILY
Qty: 180 CAPSULE | Refills: 2 | Status: SHIPPED | OUTPATIENT
Start: 2021-04-14 | End: 2021-07-14 | Stop reason: SDUPTHER

## 2021-04-14 RX ORDER — LACTIC ACID, L-, CITRIC ACID MONOHYDRATE, AND POTASSIUM BITARTRATE 90; 50; 20 MG/5G; MG/5G; MG/5G
1 GEL VAGINAL DAILY PRN
Qty: 1 BOX | Refills: 2 | Status: SHIPPED | OUTPATIENT
Start: 2021-04-14 | End: 2021-07-14

## 2021-04-14 RX ORDER — IBUPROFEN 800 MG/1
800 TABLET ORAL EVERY 6 HOURS PRN
Qty: 60 TABLET | Refills: 2 | Status: SHIPPED | OUTPATIENT
Start: 2021-04-14 | End: 2021-07-14 | Stop reason: SDUPTHER

## 2021-04-14 ASSESSMENT — ENCOUNTER SYMPTOMS
SHORTNESS OF BREATH: 0
COUGH: 0

## 2021-04-14 NOTE — PROGRESS NOTES
[address], [address] [place]  [phone]    Jailyn Matt is a 28 y.o. female who presents today for her  medical conditions/complaints as noted below. Phill Soria is c/o of 3 Month Follow-Up  . HPI:     HPI   Pt is interested in new birth control. She would like to try phexxi. Non hormonal birth control, she desires this because she is concerned that hormonal contraception is causing her to gain weight. Here for gabapentin refill. Takes this for chronic pain and anxiety. This dose works well for her. oarrs appropriate      Nursing note reviewed and discussed with patient. Patient's medications, allergies, past medical, surgical, social and family histories were reviewed and updated asappropriate. Current Outpatient Medications   Medication Sig Dispense Refill    gabapentin (NEURONTIN) 300 MG capsule Take 2 capsules by mouth 3 times daily for 30 days. 180 capsule 2    ibuprofen (ADVIL;MOTRIN) 800 MG tablet Take 1 tablet by mouth every 6 hours as needed for Pain 60 tablet 2    fluticasone (FLONASE) 50 MCG/ACT nasal spray 2 sprays by Nasal route daily 1 Bottle 3    Lactic Ac-Citric Ac-Pot Bitart (PHEXXI) 1.8-1-0.4 % GEL Place 1 Dose vaginally daily as needed (sexual intercourse) 1 box 2    methadone 10 MG/5ML solution Take 135 mg by mouth daily.  norethindrone-ethinyl estradiol (LOESTRIN FE 1/20) 1-20 MG-MCG per tablet Take 1 tablet by mouth daily (Patient not taking: Reported on 4/14/2021) 1 packet 3     No current facility-administered medications for this visit.       Past Medical History:   Diagnosis Date    No active medical problems     Opioid abuse, in remission [facility])       Past Surgical History:   Procedure Laterality Date    CYST REMOVAL      RT wrist    HERNIA REPAIR      WISDOM TOOTH EXTRACTION       Family History   Problem Relation Age of Onset    Asthma Mother     Emphysema Mother     COPD Mother    Ralph Barroso intercourse)  Dispense: 1 box; Refill: 2      RTO if symptoms worsen or fail to improve  Pt agreeable with plan      Patient given educational materials - see patientinstructions. Discussed use, benefit, and side effects of prescribed medications. All patient questions answered. Pt voiced understanding. Reviewed health maintenance. Instructed to continue current medications, diet andexercise. 1.  Daniel Swan received counseling on the following healthy behaviors: nutrition, exercise and medication adherence  2. Patient given educationalmaterials when available - see patient instructions when applicable  3. Discussed use, benefit, and side effects of prescribed medications. Barriersto medication compliance addressed. All patient questions answered. Pt voiced understanding. 4.  Reviewed prior labs and health maintenance when applicable. 5.  Continue current medications, diet and exercise. CompletedRefills   Requested Prescriptions     Signed Prescriptions Disp Refills    gabapentin (NEURONTIN) 300 MG capsule 180 capsule 2     Sig: Take 2 capsules by mouth 3 times daily for 30 days.  ibuprofen (ADVIL;MOTRIN) 800 MG tablet 60 tablet 2     Sig: Take 1 tablet by mouth every 6 hours as needed for Pain    fluticasone (FLONASE) 50 MCG/ACT nasal spray 1 Bottle 3     Si sprays by Nasal route daily    Lactic Ac-Citric Ac-Pot Bitart (PHEXXI) 1.8-1-0.4 % GEL 1 box 2     Sig: Place 1 Dose vaginally daily as needed (sexual intercourse)       This note was transcribed using dictation with Dragon services. Efforts were made to correct any errors but some words may be misinterpreted.     Electronically signed by Remington Murry CNP on 2021 at 9:05 AM

## 2021-04-14 NOTE — PROGRESS NOTES
Patient is present for 3 month f/u for med refills    Patient would like to discuss changing birth control

## 2021-05-11 ENCOUNTER — HOSPITAL ENCOUNTER (OUTPATIENT)
Age: 36
Setting detail: SPECIMEN
Discharge: HOME | End: 2021-05-11
Payer: MEDICARE

## 2021-05-11 LAB
ABSOLUTE EOS #: 0.34 K/UL (ref 0–0.44)
ABSOLUTE IMMATURE GRANULOCYTE: 0.03 K/UL (ref 0–0.3)
ABSOLUTE LYMPH #: 2.35 K/UL (ref 1.1–3.7)
ABSOLUTE MONO #: 0.33 K/UL (ref 0.1–1.2)
ALBUMIN SERPL-MCNC: 4 G/DL (ref 3.5–5.2)
ALBUMIN/GLOBULIN RATIO: 1.1 (ref 1–2.5)
ALP BLD-CCNC: 104 U/L (ref 35–104)
ALT SERPL-CCNC: 213 U/L (ref 5–33)
ANION GAP SERPL CALCULATED.3IONS-SCNC: 8 MMOL/L (ref 9–17)
AST SERPL-CCNC: 100 U/L
BASOPHILS # BLD: 0 % (ref 0–2)
BASOPHILS ABSOLUTE: 0.03 K/UL (ref 0–0.2)
BILIRUB SERPL-MCNC: 0.29 MG/DL (ref 0.3–1.2)
BUN BLDV-MCNC: 10 MG/DL (ref 6–20)
BUN/CREAT BLD: ABNORMAL (ref 9–20)
CALCIUM SERPL-MCNC: 9 MG/DL (ref 8.6–10.4)
CHLORIDE BLD-SCNC: 100 MMOL/L (ref 98–107)
CO2: 28 MMOL/L (ref 20–31)
CREAT SERPL-MCNC: 0.63 MG/DL (ref 0.5–0.9)
DIFFERENTIAL TYPE: ABNORMAL
EOSINOPHILS RELATIVE PERCENT: 5 % (ref 1–4)
GFR AFRICAN AMERICAN: >60 ML/MIN
GFR NON-AFRICAN AMERICAN: >60 ML/MIN
GFR SERPL CREATININE-BSD FRML MDRD: ABNORMAL ML/MIN/{1.73_M2}
GFR SERPL CREATININE-BSD FRML MDRD: ABNORMAL ML/MIN/{1.73_M2}
GLUCOSE BLD-MCNC: 118 MG/DL (ref 70–99)
HCT VFR BLD CALC: 41.8 % (ref 36.3–47.1)
HEMOGLOBIN: 13.7 G/DL (ref 11.9–15.1)
HEPATITIS B SURFACE ANTIGEN: NONREACTIVE
HIV AG/AB: NONREACTIVE
IMMATURE GRANULOCYTES: 0 %
LYMPHOCYTES # BLD: 33 % (ref 24–43)
MCH RBC QN AUTO: 32.5 PG (ref 25.2–33.5)
MCHC RBC AUTO-ENTMCNC: 32.8 G/DL (ref 28.4–34.8)
MCV RBC AUTO: 99.1 FL (ref 82.6–102.9)
MONOCYTES # BLD: 5 % (ref 3–12)
NRBC AUTOMATED: 0 PER 100 WBC
PDW BLD-RTO: 13.4 % (ref 11.8–14.4)
PLATELET # BLD: 167 K/UL (ref 138–453)
PLATELET ESTIMATE: ABNORMAL
PMV BLD AUTO: 11.8 FL (ref 8.1–13.5)
POTASSIUM SERPL-SCNC: 4 MMOL/L (ref 3.7–5.3)
RBC # BLD: 4.22 M/UL (ref 3.95–5.11)
RBC # BLD: ABNORMAL 10*6/UL
SEG NEUTROPHILS: 57 % (ref 36–65)
SEGMENTED NEUTROPHILS ABSOLUTE COUNT: 3.98 K/UL (ref 1.5–8.1)
SODIUM BLD-SCNC: 136 MMOL/L (ref 135–144)
T. PALLIDUM, IGG: NONREACTIVE
THYROXINE, FREE: 1.1 NG/DL (ref 0.93–1.7)
TOTAL PROTEIN: 7.7 G/DL (ref 6.4–8.3)
TSH SERPL DL<=0.05 MIU/L-ACNC: 1.08 MIU/L (ref 0.3–5)
WBC # BLD: 7.1 K/UL (ref 3.5–11.3)
WBC # BLD: ABNORMAL 10*3/UL

## 2021-05-17 LAB
DIRECT EXAM: ABNORMAL
Lab: ABNORMAL
SPECIMEN DESCRIPTION: ABNORMAL

## 2021-07-14 ENCOUNTER — OFFICE VISIT (OUTPATIENT)
Dept: FAMILY MEDICINE CLINIC | Age: 36
End: 2021-07-14
Payer: MEDICARE

## 2021-07-14 VITALS
BODY MASS INDEX: 28.72 KG/M2 | SYSTOLIC BLOOD PRESSURE: 110 MMHG | WEIGHT: 172.6 LBS | DIASTOLIC BLOOD PRESSURE: 60 MMHG | HEART RATE: 81 BPM | OXYGEN SATURATION: 96 %

## 2021-07-14 DIAGNOSIS — F41.9 ANXIETY: ICD-10-CM

## 2021-07-14 DIAGNOSIS — Z30.011 INITIATION OF OCP (BCP): ICD-10-CM

## 2021-07-14 PROCEDURE — 99213 OFFICE O/P EST LOW 20 MIN: CPT | Performed by: NURSE PRACTITIONER

## 2021-07-14 PROCEDURE — 4004F PT TOBACCO SCREEN RCVD TLK: CPT | Performed by: NURSE PRACTITIONER

## 2021-07-14 PROCEDURE — G8427 DOCREV CUR MEDS BY ELIG CLIN: HCPCS | Performed by: NURSE PRACTITIONER

## 2021-07-14 PROCEDURE — G8417 CALC BMI ABV UP PARAM F/U: HCPCS | Performed by: NURSE PRACTITIONER

## 2021-07-14 RX ORDER — GABAPENTIN 300 MG/1
600 CAPSULE ORAL 3 TIMES DAILY
Qty: 180 CAPSULE | Refills: 2 | Status: SHIPPED | OUTPATIENT
Start: 2021-07-14 | End: 2021-10-04 | Stop reason: SDUPTHER

## 2021-07-14 RX ORDER — NORETHINDRONE ACETATE AND ETHINYL ESTRADIOL 1MG-20(21)
1 KIT ORAL DAILY
Qty: 1 PACKET | Refills: 3 | Status: SHIPPED | OUTPATIENT
Start: 2021-07-14 | End: 2022-01-05 | Stop reason: SDUPTHER

## 2021-07-14 RX ORDER — IBUPROFEN 800 MG/1
800 TABLET ORAL EVERY 6 HOURS PRN
Qty: 60 TABLET | Refills: 2 | Status: SHIPPED | OUTPATIENT
Start: 2021-07-14 | End: 2022-01-05 | Stop reason: SDUPTHER

## 2021-07-14 ASSESSMENT — ENCOUNTER SYMPTOMS
COUGH: 0
SHORTNESS OF BREATH: 0

## 2021-07-14 NOTE — PROGRESS NOTES
Patient is present for 3 month f/u for med refills
Pack years: 12.00     Types: Cigarettes    Smokeless tobacco: Never Used   Substance Use Topics    Alcohol use: No      Allergies   Allergen Reactions    Macrobid [Nitrofurantoin] Nausea And Vomiting       Subjective:      Review of Systems   Constitutional: Negative for chills and fever. Respiratory: Negative for cough and shortness of breath. Cardiovascular: Negative for chest pain, palpitations and leg swelling. Other pertinent ROS in HPI  Objective:     /60 (Site: Left Upper Arm, Position: Sitting, Cuff Size: Medium Adult)   Pulse 81   Wt 172 lb 9.6 oz (78.3 kg)   SpO2 96%   BMI 28.72 kg/m²    Physical Exam  Constitutional:       Appearance: She is well-developed. HENT:      Right Ear: External ear normal.      Left Ear: External ear normal.      Nose: Nose normal.   Cardiovascular:      Rate and Rhythm: Normal rate and regular rhythm. Heart sounds: Normal heart sounds, S1 normal and S2 normal.   Pulmonary:      Effort: Pulmonary effort is normal. No respiratory distress. Breath sounds: Normal breath sounds. Musculoskeletal:         General: No deformity. Normal range of motion. Cervical back: Full passive range of motion without pain and normal range of motion. Skin:     General: Skin is warm and dry. Neurological:      Mental Status: She is alert and oriented to person, place, and time. Assessment/PLAN   1. Anxiety    - gabapentin (NEURONTIN) 300 MG capsule; Take 2 capsules by mouth 3 times daily for 30 days. Dispense: 180 capsule; Refill: 2    2. Initiation of OCP (BCP)    - norethindrone-ethinyl estradiol (LOESTRIN FE 1/20) 1-20 MG-MCG per tablet; Take 1 tablet by mouth daily  Dispense: 1 packet; Refill: 3    [facility] room  RTO if symptoms worsen or fail to improve  Pt agreeable with plan      Patient given educational materials - see patientinstructions. Discussed use, benefit, and side effects of prescribed medications. All patient questions answered.

## 2021-09-01 RX ORDER — AMOXICILLIN 875 MG/1
875 TABLET, COATED ORAL 2 TIMES DAILY
Qty: 20 TABLET | Refills: 0 | Status: SHIPPED | OUTPATIENT
Start: 2021-09-01 | End: 2021-09-11

## 2021-09-15 ENCOUNTER — TELEPHONE (OUTPATIENT)
Dept: GASTROENTEROLOGY | Age: 36
End: 2021-09-15

## 2021-09-15 NOTE — TELEPHONE ENCOUNTER
Patient was a no show on 9-14-21. LVM for patient to call the office to r/s. No show letter mailed along with no show policy.   Returning referral.

## 2021-10-04 ENCOUNTER — OFFICE VISIT (OUTPATIENT)
Dept: FAMILY MEDICINE CLINIC | Age: 36
End: 2021-10-04
Payer: MEDICARE

## 2021-10-04 VITALS
DIASTOLIC BLOOD PRESSURE: 70 MMHG | WEIGHT: 180 LBS | BODY MASS INDEX: 29.99 KG/M2 | SYSTOLIC BLOOD PRESSURE: 110 MMHG | HEIGHT: 65 IN | HEART RATE: 82 BPM | OXYGEN SATURATION: 98 %

## 2021-10-04 DIAGNOSIS — F41.9 ANXIETY: ICD-10-CM

## 2021-10-04 DIAGNOSIS — R10.9 ABDOMINAL DISCOMFORT: ICD-10-CM

## 2021-10-04 DIAGNOSIS — B18.2 HEP C W/O COMA, CHRONIC (HCC): Primary | ICD-10-CM

## 2021-10-04 PROCEDURE — G8427 DOCREV CUR MEDS BY ELIG CLIN: HCPCS | Performed by: NURSE PRACTITIONER

## 2021-10-04 PROCEDURE — G8417 CALC BMI ABV UP PARAM F/U: HCPCS | Performed by: NURSE PRACTITIONER

## 2021-10-04 PROCEDURE — 4004F PT TOBACCO SCREEN RCVD TLK: CPT | Performed by: NURSE PRACTITIONER

## 2021-10-04 PROCEDURE — G8484 FLU IMMUNIZE NO ADMIN: HCPCS | Performed by: NURSE PRACTITIONER

## 2021-10-04 PROCEDURE — 99213 OFFICE O/P EST LOW 20 MIN: CPT | Performed by: NURSE PRACTITIONER

## 2021-10-04 RX ORDER — OCTISALATE, AVOBENZONE, HOMOSALATE, AND OCTOCRYLENE 29.4; 29.4; 49; 25.48 MG/ML; MG/ML; MG/ML; MG/ML
4 LOTION TOPICAL DAILY
Qty: 30 CAPSULE | Refills: 2 | Status: SHIPPED | OUTPATIENT
Start: 2021-10-04

## 2021-10-04 RX ORDER — GABAPENTIN 400 MG/1
800 CAPSULE ORAL 3 TIMES DAILY
Qty: 180 CAPSULE | Refills: 2 | Status: SHIPPED | OUTPATIENT
Start: 2021-10-04 | End: 2022-01-05 | Stop reason: SDUPTHER

## 2021-10-04 RX ORDER — OMEPRAZOLE 20 MG/1
20 CAPSULE, DELAYED RELEASE ORAL DAILY
COMMUNITY

## 2021-10-04 NOTE — PROGRESS NOTES
Patient is present   Patient states she felt that she had to burp but felt that it was stuck  Patient states she started to have pain in her back, states she feels it was from making herself burp  Patient states she has been taking prilosec and feels it is helping

## 2021-10-04 NOTE — PROGRESS NOTES
[address] JACOB  [phone]    Vika Kline is a 39 y.o. female who presents today for her  medical conditions/complaints as noted below. Vika Kline is c/o of Other  . HPI:     HPI   Kept feeling like she had to burp, felt like it was getting stuck. Only has a bm once a week. States this has been going on for a long time. Has taken colace. Used an enema but not that effective. Has had a normal bm since. Stool is soft when she does go. She does have a apt with gi- has tried fiber pills, states she took them for a while but did not digest them well. Pt is on methadone, doesn't feel she was this constipated before that time. She does regularly pass flatus. Would like a refill on gabapentin- helps control her anxiety- would like to go back up to 800 mg tid. She has been on this dose in the past.       Wt Readings from Last 3 Encounters:   10/14/21 179 lb (81.2 kg)   10/04/21 180 lb (81.6 kg)   07/14/21 172 lb 9.6 oz (78.3 kg)         Nursing note reviewed and discussed with patient. Patient's medications, allergies, past medical, surgical, social and family histories were reviewed and updated asappropriate. Current Outpatient Medications   Medication Sig Dispense Refill    omeprazole (PRILOSEC) 20 MG delayed release capsule Take 20 mg by mouth daily      Probiotic Product (ALIGN) 4 MG CAPS Take 4 mg by mouth daily 30 capsule 2    gabapentin (NEURONTIN) 400 MG capsule Take 2 capsules by mouth 3 times daily for 30 days. 180 capsule 2    ibuprofen (ADVIL;MOTRIN) 800 MG tablet Take 1 tablet by mouth every 6 hours as needed for Pain 60 tablet 2    norethindrone-ethinyl estradiol (LOESTRIN FE 1/20) 1-20 MG-MCG per tablet Take 1 tablet by mouth daily 1 packet 3    fluticasone (FLONASE) 50 MCG/ACT nasal spray 2 sprays by Nasal route daily 1 Bottle 3    methadone 10 MG/5ML solution Take 135 mg by mouth daily.        polyethylene glycol (GLYCOLAX) 17 GM/SCOOP powder Take 17 g by mouth daily 578 g 0    docusate sodium (COLACE) 100 MG capsule Take 200 mg by mouth 2 times daily      Plecanatide (TRULANCE) 3 MG TABS Take 3 mg by mouth daily 30 tablet 5    polyethylene glycol (GLYCOLAX) 17 GM/SCOOP powder Follow instructions provided to you from physician's office. 238 g 0    bisacodyl (BISACODYL) 5 MG EC tablet Follow instructions provided given by the physician's office. 4 tablet 0    magnesium citrate solution Take 296 mLs by mouth once for 1 dose 296 mL 0     No current facility-administered medications for this visit. Past Medical History:   Diagnosis Date    No active medical problems     Opioid abuse, in remission [facility])       Past Surgical History:   Procedure Laterality Date    CYST REMOVAL      RT wrist    HERNIA REPAIR      WISDOM TOOTH EXTRACTION       Family History   Problem Relation Age of Onset    Asthma Mother     Emphysema Mother    [facility] COPD Mother     Thyroid Disease Mother     Other Mother         pre colon cancer    Emphysema Father     COPD Father     Neuropathy Father      Social History     Tobacco Use    Smoking status: Current Every Day Smoker     Packs/day: 1.00     Years: 12.00     Pack years: 12.00     Types: Cigarettes    Smokeless tobacco: Never Used   Substance Use Topics    Alcohol use: No      Allergies   Allergen Reactions    Macrobid [Nitrofurantoin] Nausea And Vomiting       Subjective:      Review of Systems   Constitutional: Negative for chills and fever. Respiratory: Negative for cough and shortness of breath. Cardiovascular: Negative for chest pain, palpitations and leg swelling. Other pertinent ROS in HPI  Objective:     /70 (Site: Left Upper Arm, Position: Sitting, Cuff Size: Large Adult)   Pulse 82   Ht 5' 5\" (1.651 m)   Wt 180 lb (81.6 kg)   SpO2 98%   BMI 29.95 kg/m²    Physical Exam  Constitutional:       Appearance: She is well-developed. HENT:      Right Ear: External ear normal.      Left Ear: External ear normal.      Nose: Nose normal.   Cardiovascular:      Rate and Rhythm: Normal rate and regular rhythm. Heart sounds: Normal heart sounds, S1 normal and S2 normal.   Pulmonary:      Effort: Pulmonary effort is normal. No respiratory distress. Breath sounds: Normal breath sounds. Musculoskeletal:         General: No deformity. Normal range of motion. Cervical back: Full passive range of motion without pain and normal range of motion. Skin:     General: Skin is warm and dry. Neurological:      Mental Status: She is alert and oriented to person, place, and time. Assessment/PLAN   1. Hep C w/o coma, chronic (HCC)    - Kimberley Sequeira MD, Gastroenterology, [address]    2. Anxiety    - gabapentin (NEURONTIN) 400 MG capsule; Take 2 capsules by mouth 3 times daily for 30 days. Dispense: 180 capsule; Refill: 2    3. Abdominal discomfort    - omeprazole (PRILOSEC) 20 MG delayed release capsule; Take 20 mg by mouth daily  - Kimberley Sequeira MD, Gastroenterology, [address]      RTO if symptoms worsen or fail to improve  Pt agreeable with plan      Patient given educational materials - see patientinstructions. Discussed use, benefit, and side effects of prescribed medications. All patient questions answered. Pt voiced understanding. Reviewed health maintenance. Instructed to continue current medications, diet andexercise. 1.  Sam Leblanc received counseling on the following healthy behaviors: nutrition, exercise and medication adherence  2. Patient given educationalmaterials when available - see patient instructions when applicable  3. Discussed use, benefit, and side effects of prescribed medications. Barriersto medication compliance addressed. All patient questions answered. Pt voiced understanding. 4.  Reviewed prior labs and health maintenance when applicable.   5.  Continue current medications, diet and exercise. CompletedRefills   Requested Prescriptions     Signed Prescriptions Disp Refills    Probiotic Product (ALIGN) 4 MG CAPS 30 capsule 2     Sig: Take 4 mg by mouth daily    gabapentin (NEURONTIN) 400 MG capsule 180 capsule 2     Sig: Take 2 capsules by mouth 3 times daily for 30 days. This note was transcribed using dictation with Dragon services. Efforts were made to correct any errors but some words may be misinterpreted.     Electronically signed by KODAK Colon CNP, CNP on 10/27/2021 at 3:18 PM

## 2021-10-05 ENCOUNTER — TELEPHONE (OUTPATIENT)
Dept: FAMILY MEDICINE CLINIC | Age: 36
End: 2021-10-05

## 2021-10-05 NOTE — TELEPHONE ENCOUNTER
----- Message from Darion Gonzales sent at 10/5/2021 11:50 AM EDT -----  Subject: Message to Provider    QUESTIONS  Information for Provider? Pharmacy sent over a Prior authorization form   needed for Antibiotic prescribed on 10/04 by KODAK Be CNP. Waiting for the response.   ---------------------------------------------------------------------------  --------------  CALL BACK INFO  What is the best way for the office to contact you? OK to leave message on   voicemail  Preferred Call Back Phone Number? [phone]  ---------------------------------------------------------------------------  --------------  SCRIPT ANSWERS  Relationship to Patient?  Self

## 2021-10-08 ENCOUNTER — TELEPHONE (OUTPATIENT)
Dept: FAMILY MEDICINE CLINIC | Age: 36
End: 2021-10-08

## 2021-10-14 ENCOUNTER — OFFICE VISIT (OUTPATIENT)
Dept: GASTROENTEROLOGY | Age: 36
End: 2021-10-14
Payer: MEDICARE

## 2021-10-14 ENCOUNTER — TELEPHONE (OUTPATIENT)
Dept: GASTROENTEROLOGY | Age: 36
End: 2021-10-14

## 2021-10-14 VITALS
HEART RATE: 64 BPM | WEIGHT: 179 LBS | BODY MASS INDEX: 29.79 KG/M2 | TEMPERATURE: 97.2 F | DIASTOLIC BLOOD PRESSURE: 83 MMHG | SYSTOLIC BLOOD PRESSURE: 127 MMHG

## 2021-10-14 DIAGNOSIS — R76.8 HEPATITIS C ANTIBODY TEST POSITIVE: Primary | ICD-10-CM

## 2021-10-14 DIAGNOSIS — Z11.59 SCREENING FOR VIRAL DISEASE: ICD-10-CM

## 2021-10-14 DIAGNOSIS — R11.0 NAUSEA: ICD-10-CM

## 2021-10-14 DIAGNOSIS — K59.09 OTHER CONSTIPATION: ICD-10-CM

## 2021-10-14 PROCEDURE — 4004F PT TOBACCO SCREEN RCVD TLK: CPT | Performed by: INTERNAL MEDICINE

## 2021-10-14 PROCEDURE — G8427 DOCREV CUR MEDS BY ELIG CLIN: HCPCS | Performed by: INTERNAL MEDICINE

## 2021-10-14 PROCEDURE — 99204 OFFICE O/P NEW MOD 45 MIN: CPT | Performed by: INTERNAL MEDICINE

## 2021-10-14 PROCEDURE — G8417 CALC BMI ABV UP PARAM F/U: HCPCS | Performed by: INTERNAL MEDICINE

## 2021-10-14 PROCEDURE — G8484 FLU IMMUNIZE NO ADMIN: HCPCS | Performed by: INTERNAL MEDICINE

## 2021-10-14 RX ORDER — BISACODYL 5 MG
TABLET, DELAYED RELEASE (ENTERIC COATED) ORAL
Qty: 4 TABLET | Refills: 0 | Status: SHIPPED | OUTPATIENT
Start: 2021-10-14 | End: 2022-07-05

## 2021-10-14 RX ORDER — PLECANATIDE 3 MG/1
3 TABLET ORAL DAILY
Qty: 30 TABLET | Refills: 5 | Status: SHIPPED
Start: 2021-10-14 | End: 2022-07-05

## 2021-10-14 RX ORDER — DOCUSATE SODIUM 100 MG/1
200 CAPSULE, LIQUID FILLED ORAL 2 TIMES DAILY
COMMUNITY
Start: 2021-09-30 | End: 2022-04-06 | Stop reason: SDUPTHER

## 2021-10-14 RX ORDER — POLYETHYLENE GLYCOL 3350 17 G/17G
POWDER, FOR SOLUTION ORAL
Qty: 238 G | Refills: 0 | Status: SHIPPED | OUTPATIENT
Start: 2021-10-14 | End: 2022-07-05

## 2021-10-14 ASSESSMENT — ENCOUNTER SYMPTOMS
CHOKING: 0
DIARRHEA: 0
VOMITING: 1
BLOOD IN STOOL: 0
NAUSEA: 1
ABDOMINAL PAIN: 0
ANAL BLEEDING: 0
RECTAL PAIN: 0
WHEEZING: 0
TROUBLE SWALLOWING: 1
CONSTIPATION: 1
COUGH: 0
ABDOMINAL DISTENTION: 1

## 2021-10-14 NOTE — PROGRESS NOTES
Reason for Referral:   Harmony Gonzalez, APRN - CNP  [address]  [address] [facility],  [address] [place] [address]    Chief Complaint   Patient presents with    Hepatitis C     Patient is new, referred for hep c.    Constipation     Patient states she has about 1 BM weekly. She is currently taking colace, 2 caps BID and probiotic. This has increased her BM to 2x weekly    Emesis     Patient states she has been vomiting since being constipated. She states when she has BM, she feels better for 1-2 days           HISTORY OF PRESENT ILLNESS: Mila Cadena is a 39 y.o. female , referred for evaluation of* hep[ C , constipation   . here for th first time       Past Medical,Family, and Social History reviewed and does contribute to the patient presentingcondition. Patient's PMH/PSH,SH,PSYCH Hx, MEDs, ALLERGIES, and ROS were all reviewed and updated in the appropriate sections. PAST MEDICAL HISTORY:  Past Medical History:   Diagnosis Date    No active medical problems     Opioid abuse, in remission [facility])        Past Surgical History:   Procedure Laterality Date    CYST REMOVAL      RT wrist    HERNIA REPAIR      WISDOM TOOTH EXTRACTION         CURRENT MEDICATIONS:    Current Outpatient Medications:     docusate sodium (COLACE) 100 MG capsule, Take 200 mg by mouth 2 times daily, Disp: , Rfl:     Plecanatide (TRULANCE) 3 MG TABS, Take 3 mg by mouth daily, Disp: 30 tablet, Rfl: 5    omeprazole (PRILOSEC) 20 MG delayed release capsule, Take 20 mg by mouth daily, Disp: , Rfl:     Probiotic Product (ALIGN) 4 MG CAPS, Take 4 mg by mouth daily, Disp: 30 capsule, Rfl: 2    gabapentin (NEURONTIN) 400 MG capsule, Take 2 capsules by mouth 3 times daily for 30 days. , Disp: 180 capsule, Rfl: 2    ibuprofen (ADVIL;MOTRIN) 800 MG tablet, Take 1 tablet by mouth every 6 hours as needed for Pain, Disp: 60 tablet, Rfl: 2    norethindrone-ethinyl estradiol (LOESTRIN FE 1/20) 1-20 MG-MCG per tablet, Take 1 tablet by mouth daily, Disp: 1 packet, Rfl: 3    fluticasone (FLONASE) 50 MCG/ACT nasal spray, 2 sprays by Nasal route daily, Disp: 1 Bottle, Rfl: 3    methadone 10 MG/5ML solution, Take 135 mg by mouth daily. , Disp: , Rfl:     ALLERGIES:   Allergies   Allergen Reactions    Macrobid [Nitrofurantoin] Nausea And Vomiting       FAMILY HISTORY:       Problem Relation Age of Onset    Asthma Mother     Emphysema Mother     COPD Mother     Thyroid Disease Mother     Other Mother         pre colon cancer    Emphysema Father     COPD Father     Neuropathy Father          SOCIAL HISTORY:   Social History     Socioeconomic History    Marital status: Single     Spouse name: Not on file    Number of children: Not on file    Years of education: Not on file    Highest education level: Not on file   Occupational History    Not on file   Tobacco Use    Smoking status: Current Every Day Smoker     Packs/day: 1.00     Years: 12.00     Pack years: 12.00     Types: Cigarettes    Smokeless tobacco: Never Used   Vaping Use    Vaping Use: Never used   Substance and Sexual Activity    Alcohol use: No    Drug use: Not Currently     Comment: sober 4.5 yrs 10/14/21    Sexual activity: Not on file   Other Topics Concern    Not on file   Social History Narrative    Not on file     Social Determinants of Health     Financial Resource Strain: Low Risk     Difficulty of Paying Living Expenses: Not hard at all   Food Insecurity: No Food Insecurity    Worried About [facility] in the Last Year: Never true    [facility] in the Last Year: Never true   Transportation Needs: No Transportation Needs    Lack of Transportation (Medical): No    Lack of Transportation (Non-Medical):  No   Physical Activity:     Days of Exercise per Week:     Minutes of Exercise per Session:    Stress:     Feeling of Stress :    Social Connections:     Frequency of Communication with Friends and Family:     Frequency of Social Gatherings with Friends and Family:     Attends Scientologist Services:     Active Member of Clubs or Organizations:     Attends Club or Organization Meetings:     Marital Status:    Intimate Partner Violence:     Fear of Current or Ex-Partner:     Emotionally Abused:     Physically Abused:     Sexually Abused:        REVIEW OF SYSTEMS: A 12-point review of systemswas obtained and pertinent positives and negatives were enumerated above in the history of present illness. All other reviewed systems / symptoms were negative. Review of Systems   Constitutional: Negative for appetite change, fatigue and unexpected weight change. HENT: Positive for trouble swallowing. Respiratory: Negative for cough, choking and wheezing. Cardiovascular: Negative for chest pain, palpitations and leg swelling. Gastrointestinal: Positive for abdominal distention, constipation, nausea and vomiting. Negative for abdominal pain, anal bleeding, blood in stool, diarrhea and rectal pain. Genitourinary: Negative for difficulty urinating. Allergic/Immunologic: Negative for environmental allergies and food allergies. Neurological: Negative for dizziness, weakness, light-headedness, numbness and headaches. Hematological: Does not bruise/bleed easily. Psychiatric/Behavioral: Negative for sleep disturbance. The patient is not nervous/anxious.             LABORATORY DATA: Reviewed  Lab Results   Component Value Date    WBC 7.1 05/11/2021    HGB 13.7 05/11/2021    HCT 41.8 05/11/2021    MCV 99.1 05/11/2021     05/11/2021     05/11/2021    K 4.0 05/11/2021     05/11/2021    CO2 28 05/11/2021    BUN 10 05/11/2021    CREATININE 0.63 05/11/2021    LABPROT 7.9 03/21/2013    LABALBU 4.0 05/11/2021    BILITOT 0.29 (L) 05/11/2021    ALKPHOS 104 05/11/2021     (H) 05/11/2021     (H) 05/11/2021         Lab Results   Component Value Date    RBC 4.22 05/11/2021    HGB 13.7 05/11/2021    MCV 99.1 05/11/2021    MCH 32.5 05/11/2021 MCHC 32.8 05/11/2021    RDW 13.4 05/11/2021    MPV 11.8 05/11/2021    BASOPCT 0 05/11/2021    LYMPHSABS 2.35 05/11/2021    MONOSABS 0.33 05/11/2021    NEUTROABS 3.98 05/11/2021    EOSABS 0.34 05/11/2021    BASOSABS 0.03 05/11/2021         DIAGNOSTIC TESTING:     No results found. /83   Pulse 64   Temp 97.2 °F (36.2 °C)   Wt 179 lb (81.2 kg)   BMI 29.79 kg/m²     PHYSICAL EXAMINATION: Vital signs reviewed per the nursing documentation. Body mass index is 29.79 kg/m². Physical Exam  Vitals and nursing note reviewed. Constitutional:       General: She is not in acute distress. Appearance: She is well-developed. She is not diaphoretic. HENT:      Head: Normocephalic. Mouth/Throat:      Pharynx: No oropharyngeal exudate. Eyes:      General: No scleral icterus. Pupils: Pupils are equal, round, and reactive to light. Neck:      Thyroid: No thyromegaly. Vascular: No JVD. Trachea: No tracheal deviation. Cardiovascular:      Rate and Rhythm: Normal rate and regular rhythm. Heart sounds: Normal heart sounds. No murmur heard. Pulmonary:      Effort: Pulmonary effort is normal. No respiratory distress. Breath sounds: Normal breath sounds. No wheezing. Abdominal:      General: Bowel sounds are normal. There is no distension. Palpations: Abdomen is soft. Tenderness: There is no abdominal tenderness. There is no guarding or rebound. Comments: No ascites   Musculoskeletal:         General: Normal range of motion. Cervical back: Normal range of motion and neck supple. Skin:     General: Skin is warm. Coloration: Skin is not pale. Findings: No erythema or rash. Comments: She is not diaphoretic   Neurological:      Mental Status: She is alert and oriented to person, place, and time. Deep Tendon Reflexes: Reflexes are normal and symmetric. Psychiatric:         Behavior: Behavior normal.         Thought Content:  Thought content normal.         Judgment: Judgment normal.         IMPRESSION: Ms. Zoya Chavez is a 39 y.o. female with      Diagnosis Orders   1. Hepatitis C antibody test positive  Hepatitis B Surface Antigen    Hepatitis B Surface Antibody    Hepatitis B Core Antibody, Total    Hepatitis A Antibody, Total    Hepatitis C Antibody    Hepatitis C RNA, quantitative, PCR    HEPATITIS C GENOTYPING    Liver Fibrosis, Chronic Viral Hepatitis    HIV Screen    CBC Auto Differential    Comp Metabolic w Bili Profile    US LIVER    AFP Tumor Marker   2. Screening for viral disease  Hepatitis B Surface Antigen    Hepatitis B Surface Antibody    Hepatitis B Core Antibody, Total    Hepatitis A Antibody, Total    Hepatitis C Antibody    Hepatitis C RNA, quantitative, PCR    HIV Screen    US LIVER   3. Nausea  EGD Routine   4. Other constipation  COLONOSCOPY W/ OR W/O BIOPSY             My patient has been free from illicit substances, controlled drugs and alcohol for about 4.5 years and will remain sober throughout treatment and thereafter. I have discussed the importance of abstaining from illicit substances and alcohol for the duration of treatment and thereafter. I have offered counseling and have provided referrals if he/she chooses to utilize them. My patient has been educated and understands the ways to prevent exposure and transmission of Hepatitis C disease. My patient does not have limited life expectancy of less than 12 months due to non-liver-related comorbid conditions. If my patient has any drug-to-drug interactions, these will be addressed by myself and pharmacy. If being prescribed Mavyret, patient will not be given any ribavirin. If patient is on statins that interact, this will be held by the patient until hepatitis treatment is complete. If my patient is prescribed Epclusa, he/she will hold PPI during hepatitis treatment if there is one prescribed.     My patient has received the anticipated dosing plan for Hepatitis C medication and follow-up schedule and agrees to be compliant and will not miss any doses of the prescribed medications. My patient fully understands that he/she is responsible for making arrangements to obtain specified laboratory blood work every 4 weeks (HCV RNA), and 12 weeks post treatment. If other labs are requested, my patient understands that he/she is responsible for making arrangements to obtain those as well. Pt will be prescribed Epclusa 1 tab daily for 12 weeks      Diet/life style/natural hx /complication of the dx were all explained in details   Past medical, past surgical, social history, psychiatric history, medications or allergies, all reviewed and  updated      Thank you for allowing me to participate in the care of Ms. Soria. For any further questions please do not hesitate to contact me. I have reviewed and agree with the MA/RN ROS. Note is dictated utilizing voice recognition software. Unfortunately this leads to occasional typographical errors. Please contact our office if you have any questions.     Catrachita Penny MD  [facility]  O: #[phone]

## 2021-10-22 ENCOUNTER — TELEPHONE (OUTPATIENT)
Dept: GASTROENTEROLOGY | Age: 36
End: 2021-10-22

## 2021-10-22 NOTE — TELEPHONE ENCOUNTER
Pt called and left a VM stating that Yaquelin was going to send a script to the [facility] on [address] for the pt to take daily to help regulate her bowels, because of her constipation.

## 2021-10-25 RX ORDER — POLYETHYLENE GLYCOL 3350 17 G/17G
17 POWDER, FOR SOLUTION ORAL DAILY
Qty: 578 G | Refills: 0 | Status: SHIPPED | OUTPATIENT
Start: 2021-10-25 | End: 2022-07-05

## 2021-10-25 NOTE — TELEPHONE ENCOUNTER
Trulance not approved by insurance. Pt needs tried and failed of Miralax. Writer prepared and signed script per Dr Cortez Braxton.   Pt is notified via VM

## 2021-10-27 ASSESSMENT — ENCOUNTER SYMPTOMS
COUGH: 0
SHORTNESS OF BREATH: 0

## 2021-11-04 ENCOUNTER — TELEPHONE (OUTPATIENT)
Dept: GASTROENTEROLOGY | Age: 36
End: 2021-11-04

## 2022-01-05 ENCOUNTER — OFFICE VISIT (OUTPATIENT)
Dept: FAMILY MEDICINE CLINIC | Age: 37
End: 2022-01-05
Payer: MEDICARE

## 2022-01-05 VITALS
HEART RATE: 91 BPM | OXYGEN SATURATION: 96 % | BODY MASS INDEX: 30.79 KG/M2 | DIASTOLIC BLOOD PRESSURE: 70 MMHG | SYSTOLIC BLOOD PRESSURE: 110 MMHG | WEIGHT: 185 LBS

## 2022-01-05 DIAGNOSIS — F41.9 ANXIETY: ICD-10-CM

## 2022-01-05 DIAGNOSIS — Z30.41 ORAL CONTRACEPTIVE PILL SURVEILLANCE: Primary | ICD-10-CM

## 2022-01-05 PROCEDURE — G8484 FLU IMMUNIZE NO ADMIN: HCPCS | Performed by: NURSE PRACTITIONER

## 2022-01-05 PROCEDURE — 4004F PT TOBACCO SCREEN RCVD TLK: CPT | Performed by: NURSE PRACTITIONER

## 2022-01-05 PROCEDURE — 99213 OFFICE O/P EST LOW 20 MIN: CPT | Performed by: NURSE PRACTITIONER

## 2022-01-05 PROCEDURE — G8427 DOCREV CUR MEDS BY ELIG CLIN: HCPCS | Performed by: NURSE PRACTITIONER

## 2022-01-05 PROCEDURE — G8417 CALC BMI ABV UP PARAM F/U: HCPCS | Performed by: NURSE PRACTITIONER

## 2022-01-05 RX ORDER — GABAPENTIN 400 MG/1
800 CAPSULE ORAL 3 TIMES DAILY
Qty: 180 CAPSULE | Refills: 2 | Status: SHIPPED | OUTPATIENT
Start: 2022-01-05 | End: 2022-04-06 | Stop reason: SDUPTHER

## 2022-01-05 RX ORDER — IBUPROFEN 800 MG/1
800 TABLET ORAL EVERY 6 HOURS PRN
Qty: 60 TABLET | Refills: 2 | Status: SHIPPED | OUTPATIENT
Start: 2022-01-05 | End: 2022-04-06 | Stop reason: SDUPTHER

## 2022-01-05 RX ORDER — NORETHINDRONE ACETATE AND ETHINYL ESTRADIOL 1MG-20(21)
1 KIT ORAL DAILY
Qty: 1 PACKET | Refills: 5 | Status: SHIPPED | OUTPATIENT
Start: 2022-01-05 | End: 2022-07-22 | Stop reason: SDUPTHER

## 2022-01-05 RX ORDER — FLUTICASONE PROPIONATE 50 MCG
2 SPRAY, SUSPENSION (ML) NASAL DAILY
Qty: 1 EACH | Refills: 5 | Status: SHIPPED | OUTPATIENT
Start: 2022-01-05

## 2022-01-05 ASSESSMENT — ENCOUNTER SYMPTOMS
COUGH: 0
SHORTNESS OF BREATH: 0

## 2022-01-05 NOTE — PROGRESS NOTES
Jesusa Collet Mettie (:  1985) is a 39 y.o. female,Established patient, here for evaluation of the following chief complaint(s):  3 Month Follow-Up         ASSESSMENT/PLAN:  1. Oral contraceptive pill surveillance  2. Anxiety  -     gabapentin (NEURONTIN) 400 MG capsule; Take 2 capsules by mouth 3 times daily for 30 days. , Disp-180 capsule, R-2Normal      No follow-ups on file. Subjective   SUBJECTIVE/OBJECTIVE:  HPI   Reminded about pap  No concerns  Doing well on meds. Review of Systems   Constitutional: Negative for chills and fever. Respiratory: Negative for cough and shortness of breath. Cardiovascular: Negative for chest pain, palpitations and leg swelling. Objective   Physical Exam  Constitutional:       Appearance: She is well-developed. HENT:      Right Ear: External ear normal.      Left Ear: External ear normal.      Nose: Nose normal.   Cardiovascular:      Rate and Rhythm: Normal rate and regular rhythm. Heart sounds: Normal heart sounds, S1 normal and S2 normal.   Pulmonary:      Effort: Pulmonary effort is normal. No respiratory distress. Breath sounds: Normal breath sounds. Musculoskeletal:         General: No deformity. Normal range of motion. Cervical back: Full passive range of motion without pain and normal range of motion. Skin:     General: Skin is warm and dry. Neurological:      Mental Status: She is alert and oriented to person, place, and time. On this date 2022 I have spent 15 minutes reviewing previous notes, test results and face to face with the patient discussing the diagnosis and importance of compliance with the treatment plan as well as documenting on the day of the visit. An electronic signature was used to authenticate this note.     --KODAK Crenshaw - CNP

## 2022-02-24 ENCOUNTER — PATIENT MESSAGE (OUTPATIENT)
Dept: FAMILY MEDICINE CLINIC | Age: 37
End: 2022-02-24

## 2022-02-24 DIAGNOSIS — Z78.9 KETOGENIC DIET: Primary | ICD-10-CM

## 2022-02-25 NOTE — TELEPHONE ENCOUNTER
From: Swetha Boyle  To: Venessa Atkinson  Sent: 2/24/2022 8:19 PM EST  Subject: Test strips    Agata andersonry for sending you another message. I wanted to see if you could also try to prescribe me these KetoBM test strips to test for ketosis. Or if this is not a kind you can prescribe, I looked up my insurance ([employer] And they do allow you to prescribe ketone testing meter and strips. But, I don't know if I have to have a diabetes diagnosis. Can we try? The rest strips are so darn expensive! Thanks Lenny Askew. I'm sorry for bother you, again.      Gena

## 2022-02-28 RX ORDER — DOCUSATE SODIUM 100 MG/1
100 CAPSULE, LIQUID FILLED ORAL 2 TIMES DAILY PRN
Qty: 40 CAPSULE | Refills: 5 | Status: SHIPPED | OUTPATIENT
Start: 2022-02-28 | End: 2022-10-03 | Stop reason: SDUPTHER

## 2022-04-06 ENCOUNTER — OFFICE VISIT (OUTPATIENT)
Dept: FAMILY MEDICINE CLINIC | Age: 37
End: 2022-04-06
Payer: MEDICARE

## 2022-04-06 VITALS
SYSTOLIC BLOOD PRESSURE: 120 MMHG | WEIGHT: 167 LBS | OXYGEN SATURATION: 97 % | BODY MASS INDEX: 27.79 KG/M2 | DIASTOLIC BLOOD PRESSURE: 64 MMHG | HEART RATE: 81 BPM

## 2022-04-06 DIAGNOSIS — F41.9 ANXIETY: ICD-10-CM

## 2022-04-06 PROCEDURE — 99213 OFFICE O/P EST LOW 20 MIN: CPT | Performed by: NURSE PRACTITIONER

## 2022-04-06 PROCEDURE — G8427 DOCREV CUR MEDS BY ELIG CLIN: HCPCS | Performed by: NURSE PRACTITIONER

## 2022-04-06 PROCEDURE — 4004F PT TOBACCO SCREEN RCVD TLK: CPT | Performed by: NURSE PRACTITIONER

## 2022-04-06 PROCEDURE — G8417 CALC BMI ABV UP PARAM F/U: HCPCS | Performed by: NURSE PRACTITIONER

## 2022-04-06 RX ORDER — GABAPENTIN 400 MG/1
800 CAPSULE ORAL 3 TIMES DAILY
Qty: 180 CAPSULE | Refills: 2 | Status: SHIPPED | OUTPATIENT
Start: 2022-04-06 | End: 2022-07-05 | Stop reason: SDUPTHER

## 2022-04-06 RX ORDER — IBUPROFEN 800 MG/1
800 TABLET ORAL EVERY 6 HOURS PRN
Qty: 60 TABLET | Refills: 2 | Status: SHIPPED | OUTPATIENT
Start: 2022-04-06 | End: 2022-07-19

## 2022-04-06 SDOH — ECONOMIC STABILITY: FOOD INSECURITY: WITHIN THE PAST 12 MONTHS, YOU WORRIED THAT YOUR FOOD WOULD RUN OUT BEFORE YOU GOT MONEY TO BUY MORE.: NEVER TRUE

## 2022-04-06 SDOH — ECONOMIC STABILITY: FOOD INSECURITY: WITHIN THE PAST 12 MONTHS, THE FOOD YOU BOUGHT JUST DIDN'T LAST AND YOU DIDN'T HAVE MONEY TO GET MORE.: NEVER TRUE

## 2022-04-06 ASSESSMENT — PATIENT HEALTH QUESTIONNAIRE - PHQ9
SUM OF ALL RESPONSES TO PHQ QUESTIONS 1-9: 0
SUM OF ALL RESPONSES TO PHQ QUESTIONS 1-9: 0
1. LITTLE INTEREST OR PLEASURE IN DOING THINGS: 0
SUM OF ALL RESPONSES TO PHQ9 QUESTIONS 1 & 2: 0
SUM OF ALL RESPONSES TO PHQ QUESTIONS 1-9: 0
SUM OF ALL RESPONSES TO PHQ QUESTIONS 1-9: 0
2. FEELING DOWN, DEPRESSED OR HOPELESS: 0

## 2022-04-06 ASSESSMENT — ENCOUNTER SYMPTOMS
COUGH: 0
SHORTNESS OF BREATH: 0

## 2022-04-06 ASSESSMENT — SOCIAL DETERMINANTS OF HEALTH (SDOH): HOW HARD IS IT FOR YOU TO PAY FOR THE VERY BASICS LIKE FOOD, HOUSING, MEDICAL CARE, AND HEATING?: NOT HARD AT ALL

## 2022-04-06 NOTE — PROGRESS NOTES
Lauren Soria (:  1985) is a 39 y.o. female,Established patient, here for evaluation of the following chief complaint(s):  3 Month Follow-Up and Medication Refill         ASSESSMENT/PLAN:  1. Anxiety  -     gabapentin (NEURONTIN) 400 MG capsule; Take 2 capsules by mouth 3 times daily for 30 days. , Disp-180 capsule, R-2Normal      No follow-ups on file. Subjective   SUBJECTIVE/OBJECTIVE:  HPI    Review of Systems   Constitutional: Negative for chills and fever. Respiratory: Negative for cough and shortness of breath. Cardiovascular: Negative for chest pain, palpitations and leg swelling. Objective      Vitals:    22 0734   BP: 120/64   Pulse: 81   SpO2: 97%     Wt Readings from Last 3 Encounters:   22 167 lb (75.8 kg)   22 185 lb (83.9 kg)   10/14/21 179 lb (81.2 kg)       Physical Exam  Constitutional:       Appearance: She is well-developed. HENT:      Right Ear: External ear normal.      Left Ear: External ear normal.      Nose: Nose normal.   Cardiovascular:      Rate and Rhythm: Normal rate and regular rhythm. Heart sounds: Normal heart sounds, S1 normal and S2 normal.   Pulmonary:      Effort: Pulmonary effort is normal. No respiratory distress. Breath sounds: Normal breath sounds. Musculoskeletal:         General: No deformity. Normal range of motion. Cervical back: Full passive range of motion without pain and normal range of motion. Skin:     General: Skin is warm and dry. Neurological:      Mental Status: She is alert and oriented to person, place, and time. An electronic signature was used to authenticate this note.     --KODAK Hadley - CNP

## 2022-04-14 ENCOUNTER — PATIENT MESSAGE (OUTPATIENT)
Dept: FAMILY MEDICINE CLINIC | Age: 37
End: 2022-04-14

## 2022-07-05 ENCOUNTER — OFFICE VISIT (OUTPATIENT)
Dept: FAMILY MEDICINE CLINIC | Age: 37
End: 2022-07-05
Payer: MEDICARE

## 2022-07-05 VITALS
WEIGHT: 169 LBS | DIASTOLIC BLOOD PRESSURE: 70 MMHG | OXYGEN SATURATION: 97 % | BODY MASS INDEX: 28.12 KG/M2 | HEART RATE: 77 BPM | TEMPERATURE: 98.5 F | SYSTOLIC BLOOD PRESSURE: 118 MMHG

## 2022-07-05 DIAGNOSIS — Z13.1 ENCOUNTER FOR SCREENING FOR DIABETES MELLITUS: ICD-10-CM

## 2022-07-05 DIAGNOSIS — F41.9 ANXIETY: Primary | ICD-10-CM

## 2022-07-05 LAB — HBA1C MFR BLD: 4.9 %

## 2022-07-05 PROCEDURE — G8417 CALC BMI ABV UP PARAM F/U: HCPCS | Performed by: NURSE PRACTITIONER

## 2022-07-05 PROCEDURE — 4004F PT TOBACCO SCREEN RCVD TLK: CPT | Performed by: NURSE PRACTITIONER

## 2022-07-05 PROCEDURE — 99213 OFFICE O/P EST LOW 20 MIN: CPT | Performed by: NURSE PRACTITIONER

## 2022-07-05 PROCEDURE — 83036 HEMOGLOBIN GLYCOSYLATED A1C: CPT | Performed by: NURSE PRACTITIONER

## 2022-07-05 PROCEDURE — G8427 DOCREV CUR MEDS BY ELIG CLIN: HCPCS | Performed by: NURSE PRACTITIONER

## 2022-07-05 RX ORDER — GABAPENTIN 400 MG/1
800 CAPSULE ORAL 3 TIMES DAILY
Qty: 180 CAPSULE | Refills: 2 | Status: SHIPPED | OUTPATIENT
Start: 2022-07-05 | End: 2022-10-03 | Stop reason: SDUPTHER

## 2022-07-05 ASSESSMENT — ENCOUNTER SYMPTOMS
SHORTNESS OF BREATH: 0
COUGH: 0

## 2022-07-05 NOTE — PATIENT INSTRUCTIONS
program.   After you quit, do not use tobacco again, not even once. Get rid of all tobacco products and anything that reminds you of tobacco, like ashtrays, spit cups, and lighters. If you smoke, clean your house and your clothes to get rid of the smell.  Learn how to live without tobacco. Think about ways you can avoid those things that make you reach for tobacco.  ? Avoid situations that put you at greatest risk. For some people, it's hard to have a drink with friends or a coffee break with coworkers without using tobacco.  ? Change your daily routine. Take a different route to work, or eat a meal in a different place.  Try to cut down on stress. Calm yourself or release tension by doing an activity you enjoy, such as reading a book, taking a hot bath, or gardening.  Learn about treatments that can help you quit. ? Talk to your doctor or pharmacist about nicotine replacement therapy. Nicotine replacement products help you slowly reduce the amount of nicotine you need. They can help you deal with cravings and withdrawal symptoms. These products include nicotine patches, gum, lozenges, nasal spray, and inhalers. Most are available without a prescription. ? Ask your doctor about varenicline (Chantix) or bupropion SR. These prescription medicines can help reduce withdrawal symptoms. They don't contain nicotine.  Eat a healthy diet, and get regular exercise. Having healthy habits will help your body move past its craving for nicotine.  Be prepared to keep trying. Most people aren't successful the first few times they try to quit. Don't give up if you use tobacco again. Make a list of things you learned, and think about when you want to try again. Where can you learn more? Go to https://mert.Listiki. org and sign in to your [employer] account. Enter Y881 in the [employer] box to learn more about \"Quitting Tobacco: Care Instructions. \"     If you do not have an account, please click on the \"Sign Up Now\" link. Current as of: November 8, 2021               Content Version: 13.3  © 2006-2022 Healthwise, Incorporated. Care instructions adapted under license by [facility] [place] ([facility]). If you have questions about a medical condition or this instruction, always ask your healthcare professional. [place] [address] any warranty or liability for your use of this information.

## 2022-07-05 NOTE — PROGRESS NOTES
Saint Apo Mettie (:  1985) is a 39 y.o. female,Established patient, here for evaluation of the following chief complaint(s): Anxiety         ASSESSMENT/PLAN:  1. Anxiety  -     gabapentin (NEURONTIN) 400 MG capsule; Take 2 capsules by mouth 3 times daily for 30 days. , Disp-180 capsule, R-2Normal  2. Encounter for screening for diabetes mellitus  -     POCT glycosylated hemoglobin (Hb A1C)  -     POCT glycosylated hemoglobin (Hb A1C) - NOT COVERED/DO NOT USE FOR MEDICARE PATIENTS      No follow-ups on file. Subjective   SUBJECTIVE/OBJECTIVE:  HPI    Review of Systems   Constitutional: Negative for chills and fever. Respiratory: Negative for cough and shortness of breath. Cardiovascular: Negative for chest pain, palpitations and leg swelling. Objective    Vitals:    22 0811   BP: 118/70   Pulse: 77   Temp: 98.5 °F (36.9 °C)   SpO2: 97%     Wt Readings from Last 3 Encounters:   22 169 lb (76.7 kg)   22 167 lb (75.8 kg)   22 185 lb (83.9 kg)         Physical Exam  Constitutional:       Appearance: She is well-developed. HENT:      Right Ear: External ear normal.      Left Ear: External ear normal.      Nose: Nose normal.   Cardiovascular:      Rate and Rhythm: Normal rate and regular rhythm. Heart sounds: Normal heart sounds, S1 normal and S2 normal.   Pulmonary:      Effort: Pulmonary effort is normal. No respiratory distress. Breath sounds: Normal breath sounds. Musculoskeletal:         General: No deformity. Normal range of motion. Cervical back: Full passive range of motion without pain and normal range of motion. Skin:     General: Skin is warm and dry. Neurological:      Mental Status: She is alert and oriented to person, place, and time. An electronic signature was used to authenticate this note.     --Jorden Gracia, KODAK - CNP

## 2022-07-15 ENCOUNTER — TELEPHONE (OUTPATIENT)
Dept: FAMILY MEDICINE CLINIC | Age: 37
End: 2022-07-15

## 2022-07-15 NOTE — LETTER
[address]  [address]  [address] [place] [address]  Phone: [phone]  Fax: [phone]    Marissa Moment, APRN - CNP        July 15, 2022     Patient: Latasha Georges   YOB: 1985       To Whom It May Concern:     Due to the denial of Movantik 25mg, this letter is to appeal this decision. It is in my medical opinion that it is medically necessary for this patient to be on this medication, due to the diagnosis of Constipation (K59.00). Patient has tried and failed Polyethylen glycol powder, Senna, and Docusate Sodium. Please see the attached progress notes for further clarification. If you have any questions or concerns, please don't hesitate to call.       Sincerely,        Marissa Moment, APRN - CNP

## 2022-07-15 NOTE — LETTER
[address]  [address] [place] [address]  Phone: [phone]  Fax: [phone]    KODAK Bingham CNP        July 15, 2022     Patient: Dagoberto Manley   YOB: 1985       To Whom It May Concern:     Due to the denial of Movantik 25mg, this letter is to appeal this decision. It is in my medical opinion that it is medically necessary for this patient to be on this medication, due to the diagnosis of Constipation (K59.00). Patient has tried and failed Polyethylene glycol powder, Senna, and Docusate Sodium. Please see the attached progress notes for further clarification. If you have any questions or concerns, please don't hesitate to call.       Sincerely,        KODAK Bingham CNP

## 2022-07-15 NOTE — TELEPHONE ENCOUNTER
She has tried most of those meds on the alternative therapy list.    ----- Message -----   From: Hiram Quispe MA   Sent: 7/14/2022   4:09 PM EDT   To: KODAK Crews CNP   Subject: Edit                                               The scan below was edited by Hiram Quispe, [address] [place] [address] on 7/14/2022 at 4:09 PM; it is attached to the following: Abstract on 7/14/2022 with Kodak Crews Cnp. Spoke with patient and she stated she has tried OTC senna and also tried miralax. Will send in appeal for this.

## 2022-07-18 NOTE — TELEPHONE ENCOUNTER
Last visit: 07/05/2022  Last Med refill: 06/23/2022  Does patient have enough medication for 72 hours:     Next Visit Date:  Future Appointments   Date Time Provider Sy Dent   10/3/2022  7:45 AM Loye Banana, APRN - CNP [facility] [address] Maintenance   Topic Date Due    Cervical cancer screen  Never done    COVID-19 Vaccine (3 - Booster for Harrell Travis series) 01/05/2023 (Originally 9/29/2021)    Hepatitis A vaccine (1 of 2 - Risk 2-dose series) 07/05/2023 (Originally 8/9/1986)    Hepatitis B vaccine (1 of 3 - Risk 3-dose series) 07/05/2023 (Originally 8/9/2004)    Varicella vaccine (1 of 2 - 2-dose childhood series) 07/05/2023 (Originally 8/9/1986)    Pneumococcal 0-64 years Vaccine (1 - PCV) 07/05/2023 (Originally 8/9/1991)    DTaP/Tdap/Td vaccine (1 - Tdap) 06/24/2024 (Originally 8/9/2004)    Flu vaccine (1) 09/01/2022    Depression Screen  04/06/2023    Diabetes screen  07/05/2025    Hepatitis C screen  Completed    HIV screen  Completed    Hib vaccine  Aged Out    Meningococcal (ACWY) vaccine  Aged Out       Hemoglobin A1C (%)   Date Value   07/05/2022 4.9             ( goal A1C is < 7)   No results found for: LABMICR  No results found for: LDLCHOLESTEROL, LDLCALC    (goal LDL is <100)   AST (U/L)   Date Value   05/11/2021 100 (H)     ALT (U/L)   Date Value   05/11/2021 213 (H)     BUN (mg/dL)   Date Value   05/11/2021 10     BP Readings from Last 3 Encounters:   07/05/22 118/70   04/06/22 120/64   01/05/22 110/70          (goal 120/80)    All Future Testing planned in [facility]  Lab Frequency Next Occurrence   Hepatitis B Surface Antigen Once 10/14/2021   Hepatitis B Surface Antibody Once 10/14/2021   Hepatitis B Core Antibody, Total Once 10/14/2021   Hepatitis A Antibody, Total Once 10/14/2021   Hepatitis C Antibody Once 10/14/2021   Hepatitis C RNA, quantitative, PCR Once 10/14/2021   HEPATITIS C GENOTYPING Once 10/14/2021   Liver Fibrosis, Chronic Viral Hepatitis Once 10/14/2021 HIV Screen Once 10/14/2021   CBC Auto Differential Once 27/18/1508   Comp Metabolic w Bili Profile Once 10/14/2021   US LIVER Once 10/14/2021   AFP Tumor Marker Once 10/14/2021               Patient Active Problem List:     Constipation     Endometriosis     Nicotine dependence     Panic attack     Hepatitis C antibody test positive     Drug abuse in remission ([facility] [address]

## 2022-07-19 RX ORDER — IBUPROFEN 800 MG/1
TABLET ORAL
Qty: 60 TABLET | Refills: 2 | Status: SHIPPED | OUTPATIENT
Start: 2022-07-19 | End: 2022-10-03 | Stop reason: SDUPTHER

## 2022-07-22 RX ORDER — NORETHINDRONE ACETATE AND ETHINYL ESTRADIOL 1MG-20(21)
1 KIT ORAL DAILY
Qty: 1 PACKET | Refills: 5 | Status: SHIPPED | OUTPATIENT
Start: 2022-07-22

## 2022-07-22 NOTE — TELEPHONE ENCOUNTER
Last visit: 07/05/2022  Last Med refill: 06/05/2022  Does patient have enough medication for 72 hours: No:     Next Visit Date:  Future Appointments   Date Time Provider Sy Dent   10/3/2022  7:45 AM Montserrat Gonzalez APRN - CNP [facility] [address] Maintenance   Topic Date Due    Cervical cancer screen  Never done    COVID-19 Vaccine (3 - Booster for Harrell Peter series) 01/05/2023 (Originally 9/29/2021)    Hepatitis A vaccine (1 of 2 - Risk 2-dose series) 07/05/2023 (Originally 8/9/1986)    Hepatitis B vaccine (1 of 3 - Risk 3-dose series) 07/05/2023 (Originally 8/9/2004)    Varicella vaccine (1 of 2 - 2-dose childhood series) 07/05/2023 (Originally 8/9/1986)    Pneumococcal 0-64 years Vaccine (1 - PCV) 07/05/2023 (Originally 8/9/1991)    DTaP/Tdap/Td vaccine (1 - Tdap) 06/24/2024 (Originally 8/9/2004)    Flu vaccine (1) 09/01/2022    Depression Screen  04/06/2023    Diabetes screen  07/05/2025    Hepatitis C screen  Completed    HIV screen  Completed    Hib vaccine  Aged Out    Meningococcal (ACWY) vaccine  Aged Out       Hemoglobin A1C (%)   Date Value   07/05/2022 4.9             ( goal A1C is < 7)   No results found for: LABMICR  No results found for: LDLCHOLESTEROL, LDLCALC    (goal LDL is <100)   AST (U/L)   Date Value   05/11/2021 100 (H)     ALT (U/L)   Date Value   05/11/2021 213 (H)     BUN (mg/dL)   Date Value   05/11/2021 10     BP Readings from Last 3 Encounters:   07/05/22 118/70   04/06/22 120/64   01/05/22 110/70          (goal 120/80)    All Future Testing planned in [facility]  Lab Frequency Next Occurrence   Hepatitis B Surface Antigen Once 10/14/2021   Hepatitis B Surface Antibody Once 10/14/2021   Hepatitis B Core Antibody, Total Once 10/14/2021   Hepatitis A Antibody, Total Once 10/14/2021   Hepatitis C Antibody Once 10/14/2021   Hepatitis C RNA, quantitative, PCR Once 10/14/2021   HEPATITIS C GENOTYPING Once 10/14/2021   Liver Fibrosis, Chronic Viral Hepatitis Once 10/14/2021   HIV Screen Once 10/14/2021   CBC Auto Differential Once 52/56/9221   Comp Metabolic w Bili Profile Once 10/14/2021   US LIVER Once 10/14/2021   AFP Tumor Marker Once 10/14/2021               Patient Active Problem List:     Constipation     Endometriosis     Nicotine dependence     Panic attack     Hepatitis C antibody test positive     Drug abuse in remission ([facility] [address] Complex Repair And Tissue Cultured Epidermal Autograft Text: The defect edges were debeveled with a #15 scalpel blade.  The primary defect was closed partially with a complex linear closure.  Given the location of the defect, shape of the defect and the proximity to free margins an tissue cultured epidermal autograft was deemed most appropriate to repair the remaining defect.  The graft was trimmed to fit the size of the remaining defect.  The graft was then placed in the primary defect, oriented appropriately, and sutured into place.

## 2022-08-23 ENCOUNTER — PATIENT MESSAGE (OUTPATIENT)
Dept: FAMILY MEDICINE CLINIC | Age: 37
End: 2022-08-23

## 2022-08-23 DIAGNOSIS — N64.4 BREAST PAIN, RIGHT: Primary | ICD-10-CM

## 2022-08-23 NOTE — TELEPHONE ENCOUNTER
From: Merle Toribio  To: Unknown Ding  Sent: 8/23/2022 3:23 PM EDT  Subject: Mamogram    I would just like to know if I need an order or referral for a mammogram. I have been having some pain in my right breast behind my nipple. I would like a mammogram just to make sure that everything is fine. If I do need an order, do I need to make an appointment to come to the office for a visit to get the order/referral?    Thank you very much.      Gena Soria

## 2022-08-30 ENCOUNTER — TELEPHONE (OUTPATIENT)
Dept: FAMILY MEDICINE CLINIC | Age: 37
End: 2022-08-30

## 2022-08-30 DIAGNOSIS — N64.4 BREAST PAIN: Primary | ICD-10-CM

## 2022-10-03 ENCOUNTER — OFFICE VISIT (OUTPATIENT)
Dept: FAMILY MEDICINE CLINIC | Age: 37
End: 2022-10-03
Payer: MEDICARE

## 2022-10-03 VITALS
OXYGEN SATURATION: 99 % | WEIGHT: 172 LBS | SYSTOLIC BLOOD PRESSURE: 110 MMHG | BODY MASS INDEX: 28.62 KG/M2 | HEART RATE: 76 BPM | DIASTOLIC BLOOD PRESSURE: 60 MMHG

## 2022-10-03 DIAGNOSIS — Z76.0 MEDICATION REFILL: ICD-10-CM

## 2022-10-03 DIAGNOSIS — F41.9 ANXIETY: Primary | ICD-10-CM

## 2022-10-03 PROCEDURE — G8427 DOCREV CUR MEDS BY ELIG CLIN: HCPCS | Performed by: NURSE PRACTITIONER

## 2022-10-03 PROCEDURE — G8417 CALC BMI ABV UP PARAM F/U: HCPCS | Performed by: NURSE PRACTITIONER

## 2022-10-03 PROCEDURE — 4004F PT TOBACCO SCREEN RCVD TLK: CPT | Performed by: NURSE PRACTITIONER

## 2022-10-03 PROCEDURE — 99213 OFFICE O/P EST LOW 20 MIN: CPT | Performed by: NURSE PRACTITIONER

## 2022-10-03 PROCEDURE — G8484 FLU IMMUNIZE NO ADMIN: HCPCS | Performed by: NURSE PRACTITIONER

## 2022-10-03 RX ORDER — GABAPENTIN 400 MG/1
800 CAPSULE ORAL 3 TIMES DAILY
Qty: 180 CAPSULE | Refills: 2 | Status: SHIPPED | OUTPATIENT
Start: 2022-10-03 | End: 2022-11-02

## 2022-10-03 RX ORDER — DOCUSATE SODIUM 100 MG/1
100 CAPSULE, LIQUID FILLED ORAL 2 TIMES DAILY PRN
Qty: 40 CAPSULE | Refills: 5 | Status: SHIPPED | OUTPATIENT
Start: 2022-10-03

## 2022-10-03 RX ORDER — IBUPROFEN 800 MG/1
TABLET ORAL
Qty: 60 TABLET | Refills: 2 | Status: SHIPPED | OUTPATIENT
Start: 2022-10-03

## 2022-10-03 ASSESSMENT — ENCOUNTER SYMPTOMS
COUGH: 0
SHORTNESS OF BREATH: 0

## 2022-10-03 NOTE — PROGRESS NOTES
Sam Soria (:  1985) is a 40 y.o. female,Established patient, here for evaluation of the following chief complaint(s):  Medication Refill         ASSESSMENT/PLAN:  1. Anxiety  -     gabapentin (NEURONTIN) 400 MG capsule; Take 2 capsules by mouth 3 times daily for 30 days. , Disp-180 capsule, R-2Normal  2. Medication refill  -     ibuprofen (ADVIL;MOTRIN) 800 MG tablet; TAKE ONE TABLET BY MOUTH EVERY 6 HOURS AS NEEDED FOR PAIN, Disp-60 tablet, R-2Normal  -     docusate sodium (COLACE) 100 MG capsule; Take 1 capsule by mouth 2 times daily as needed for Constipation, Disp-40 capsule, R-5Normal      No follow-ups on file. Subjective   SUBJECTIVE/OBJECTIVE:  HPI    Review of Systems   Constitutional:  Negative for chills and fever. Respiratory:  Negative for cough and shortness of breath. Cardiovascular:  Negative for chest pain, palpitations and leg swelling. Objective   Vitals:    10/03/22 0753   BP: 110/60   Pulse: 76   SpO2: 99%     Wt Readings from Last 3 Encounters:   10/03/22 172 lb (78 kg)   22 169 lb (76.7 kg)   22 167 lb (75.8 kg)       Physical Exam  Constitutional:       Appearance: She is well-developed. HENT:      Right Ear: External ear normal.      Left Ear: External ear normal.      Nose: Nose normal.   Cardiovascular:      Rate and Rhythm: Normal rate and regular rhythm. Heart sounds: Normal heart sounds, S1 normal and S2 normal.   Pulmonary:      Effort: Pulmonary effort is normal. No respiratory distress. Breath sounds: Normal breath sounds. Musculoskeletal:         General: No deformity. Normal range of motion. Cervical back: Full passive range of motion without pain and normal range of motion. Skin:     General: Skin is warm and dry. Neurological:      Mental Status: She is alert and oriented to person, place, and time. An electronic signature was used to authenticate this note.     --Ricky Humphrey, APRN - CNP

## 2022-10-17 RX ORDER — URINE ACETONE TEST STRIPS
STRIP MISCELLANEOUS
Qty: 100 STRIP | Refills: 0 | Status: SHIPPED | OUTPATIENT
Start: 2022-10-17

## 2022-10-17 NOTE — TELEPHONE ENCOUNTER
Last visit: 10/3/22  Last Med refill: 2/28/22  Does patient have enough medication for 72 hours: No:     Next Visit Date:  No future appointments.     Health Maintenance   Topic Date Due    Cervical cancer screen  Never done    COVID-19 Vaccine (3 - Booster for Pfizer series) 01/05/2023 (Originally 9/29/2021)    Varicella vaccine (1 of 2 - 2-dose childhood series) 07/05/2023 (Originally 8/9/1986)    Pneumococcal 0-64 years Vaccine (1 - PCV) 07/05/2023 (Originally 8/9/1991)    Flu vaccine (1) 10/03/2023 (Originally 8/1/2022)    DTaP/Tdap/Td vaccine (1 - Tdap) 06/24/2024 (Originally 8/9/2004)    Depression Screen  04/06/2023    Hepatitis C screen  Completed    HIV screen  Completed    Hepatitis A vaccine  Aged Out    Hib vaccine  Aged Out    Meningococcal (ACWY) vaccine  Aged Out       Hemoglobin A1C (%)   Date Value   07/05/2022 4.9             ( goal A1C is < 7)   No results found for: LABMICR  No results found for: LDLCHOLESTEROL, LDLCALC    (goal LDL is <100)   AST (U/L)   Date Value   05/11/2021 100 (H)     ALT (U/L)   Date Value   05/11/2021 213 (H)     BUN (mg/dL)   Date Value   05/11/2021 10     BP Readings from Last 3 Encounters:   10/03/22 110/60   07/05/22 118/70   04/06/22 120/64          (goal 120/80)    All Future Testing planned in [facility]  Lab Frequency Next Occurrence   FELIX DIGITAL DIAGNOSTIC W OR WO CAD RIGHT Once 08/24/2022   FELIX DIGITAL DIAGNOSTIC W OR WO CAD BILATERAL Once 08/31/2022               Patient Active Problem List:     Constipation     Endometriosis     Nicotine dependence     Panic attack     Hepatitis C antibody test positive     Drug abuse in remission ([facility] [address]

## 2022-11-15 ENCOUNTER — HOSPITAL ENCOUNTER (OUTPATIENT)
Age: 37
Discharge: HOME | End: 2022-11-15

## 2022-11-16 LAB
EKG ATRIAL RATE: 73 BPM
EKG P AXIS: 47 DEGREES
EKG P-R INTERVAL: 136 MS
EKG Q-T INTERVAL: 422 MS
EKG QRS DURATION: 92 MS
EKG QTC CALCULATION (BAZETT): 464 MS
EKG R AXIS: 71 DEGREES
EKG T AXIS: 41 DEGREES
EKG VENTRICULAR RATE: 73 BPM

## 2023-01-06 ENCOUNTER — OFFICE VISIT (OUTPATIENT)
Dept: FAMILY MEDICINE CLINIC | Age: 38
End: 2023-01-06
Payer: MEDICARE

## 2023-01-06 VITALS
SYSTOLIC BLOOD PRESSURE: 120 MMHG | WEIGHT: 178 LBS | DIASTOLIC BLOOD PRESSURE: 60 MMHG | HEIGHT: 65 IN | BODY MASS INDEX: 29.66 KG/M2

## 2023-01-06 DIAGNOSIS — F41.9 ANXIETY: ICD-10-CM

## 2023-01-06 DIAGNOSIS — Z76.0 MEDICATION REFILL: ICD-10-CM

## 2023-01-06 PROCEDURE — G8484 FLU IMMUNIZE NO ADMIN: HCPCS | Performed by: NURSE PRACTITIONER

## 2023-01-06 PROCEDURE — G8417 CALC BMI ABV UP PARAM F/U: HCPCS | Performed by: NURSE PRACTITIONER

## 2023-01-06 PROCEDURE — 4004F PT TOBACCO SCREEN RCVD TLK: CPT | Performed by: NURSE PRACTITIONER

## 2023-01-06 PROCEDURE — 99213 OFFICE O/P EST LOW 20 MIN: CPT | Performed by: NURSE PRACTITIONER

## 2023-01-06 PROCEDURE — G8427 DOCREV CUR MEDS BY ELIG CLIN: HCPCS | Performed by: NURSE PRACTITIONER

## 2023-01-06 RX ORDER — GABAPENTIN 400 MG/1
800 CAPSULE ORAL 3 TIMES DAILY
Qty: 180 CAPSULE | Refills: 2 | Status: SHIPPED | OUTPATIENT
Start: 2023-01-06 | End: 2023-02-05

## 2023-01-06 RX ORDER — LORATADINE 10 MG/1
10 TABLET ORAL DAILY
Qty: 90 TABLET | Refills: 1 | Status: SHIPPED | OUTPATIENT
Start: 2023-01-06

## 2023-01-06 RX ORDER — IBUPROFEN 800 MG/1
TABLET ORAL
Qty: 60 TABLET | Refills: 2 | Status: SHIPPED | OUTPATIENT
Start: 2023-01-06

## 2023-01-06 RX ORDER — NORETHINDRONE ACETATE AND ETHINYL ESTRADIOL 1MG-20(21)
1 KIT ORAL DAILY
Qty: 1 PACKET | Refills: 5 | Status: SHIPPED | OUTPATIENT
Start: 2023-01-06

## 2023-01-06 ASSESSMENT — PATIENT HEALTH QUESTIONNAIRE - PHQ9
2. FEELING DOWN, DEPRESSED OR HOPELESS: 0
SUM OF ALL RESPONSES TO PHQ QUESTIONS 1-9: 0
1. LITTLE INTEREST OR PLEASURE IN DOING THINGS: 0
SUM OF ALL RESPONSES TO PHQ QUESTIONS 1-9: 0
SUM OF ALL RESPONSES TO PHQ9 QUESTIONS 1 & 2: 0

## 2023-01-06 ASSESSMENT — ENCOUNTER SYMPTOMS
COUGH: 0
SHORTNESS OF BREATH: 0

## 2023-01-06 NOTE — PROGRESS NOTES
Day Soria (:  1985) is a 40 y.o. female,Established patient, here for evaluation of the following chief complaint(s):  Medication Refill         ASSESSMENT/PLAN:  1. Anxiety  -     gabapentin (NEURONTIN) 400 MG capsule; Take 2 capsules by mouth 3 times daily for 30 days. , Disp-180 capsule, R-2Normal  2. Medication refill  -     ibuprofen (ADVIL;MOTRIN) 800 MG tablet; TAKE ONE TABLET BY MOUTH EVERY 6 HOURS AS NEEDED FOR PAIN, Disp-60 tablet, R-2Normal  -     norethindrone-ethinyl estradiol (LOESTRIN FE ) 1-20 MG-MCG per tablet; Take 1 tablet by mouth daily, Disp-1 packet, R-5Normal  -     loratadine (CLARITIN) 10 MG tablet; Take 1 tablet by mouth daily, Disp-90 tablet, R-1Normal      No follow-ups on file. Subjective   SUBJECTIVE/OBJECTIVE:  Medication Refill  Pertinent negatives include no chest pain, chills, coughing or fever. Review of Systems   Constitutional:  Negative for chills and fever. Respiratory:  Negative for cough and shortness of breath. Cardiovascular:  Negative for chest pain, palpitations and leg swelling. Objective   Vitals:    23 0730   BP: 120/60     Wt Readings from Last 3 Encounters:   23 178 lb (80.7 kg)   10/03/22 172 lb (78 kg)   22 169 lb (76.7 kg)       Physical Exam  Constitutional:       Appearance: She is well-developed. HENT:      Right Ear: External ear normal.      Left Ear: External ear normal.      Nose: Nose normal.   Cardiovascular:      Rate and Rhythm: Normal rate and regular rhythm. Heart sounds: Normal heart sounds, S1 normal and S2 normal.   Pulmonary:      Effort: Pulmonary effort is normal. No respiratory distress. Breath sounds: Normal breath sounds. Musculoskeletal:         General: No deformity. Normal range of motion. Cervical back: Full passive range of motion without pain and normal range of motion. Skin:     General: Skin is warm and dry.    Neurological:      Mental Status: She is alert and oriented to person, place, and time. An electronic signature was used to authenticate this note.     --Melanie Bolaños, KODAK - CNP

## 2023-03-30 ENCOUNTER — TELEPHONE (OUTPATIENT)
Dept: GASTROENTEROLOGY | Age: 38
End: 2023-03-30

## 2023-03-30 DIAGNOSIS — B18.2 HEP C W/O COMA, CHRONIC (HCC): Primary | ICD-10-CM

## 2023-03-30 DIAGNOSIS — R76.8 HEPATITIS C ANTIBODY TEST POSITIVE: ICD-10-CM

## 2023-03-30 NOTE — TELEPHONE ENCOUNTER
Patient was calling about Her Hep C blood work and her US that . Last blood worked RNA said undetected through [employer]  show Hep C detected. She will redo blood work at a [address] and get the [address] [facility] done as well and then see if she needs treated.

## 2023-04-07 ENCOUNTER — OFFICE VISIT (OUTPATIENT)
Dept: FAMILY MEDICINE CLINIC | Age: 38
End: 2023-04-07
Payer: MEDICAID

## 2023-04-07 ENCOUNTER — HOSPITAL ENCOUNTER (OUTPATIENT)
Age: 38
Setting detail: SPECIMEN
Discharge: HOME | End: 2023-04-07

## 2023-04-07 VITALS — BODY MASS INDEX: 27.62 KG/M2 | SYSTOLIC BLOOD PRESSURE: 120 MMHG | WEIGHT: 166 LBS | DIASTOLIC BLOOD PRESSURE: 70 MMHG

## 2023-04-07 DIAGNOSIS — F41.9 ANXIETY: ICD-10-CM

## 2023-04-07 DIAGNOSIS — B18.2 HEP C W/O COMA, CHRONIC (HCC): ICD-10-CM

## 2023-04-07 DIAGNOSIS — Z76.0 MEDICATION REFILL: ICD-10-CM

## 2023-04-07 DIAGNOSIS — R76.8 HEPATITIS C ANTIBODY TEST POSITIVE: ICD-10-CM

## 2023-04-07 LAB
ABSOLUTE EOS #: 0.15 K/UL (ref 0–0.44)
ABSOLUTE IMMATURE GRANULOCYTE: <0.03 K/UL (ref 0–0.3)
ABSOLUTE LYMPH #: 1.94 K/UL (ref 1.1–3.7)
ABSOLUTE MONO #: 0.36 K/UL (ref 0.1–1.2)
ALBUMIN SERPL-MCNC: 4.6 G/DL (ref 3.5–5.2)
ALBUMIN/GLOBULIN RATIO: 1.4 (ref 1–2.5)
ALP SERPL-CCNC: 64 U/L (ref 35–104)
ALT SERPL-CCNC: 64 U/L (ref 5–33)
ANION GAP SERPL CALCULATED.3IONS-SCNC: 15 MMOL/L (ref 9–17)
AST SERPL-CCNC: 40 U/L
BASOPHILS # BLD: 1 % (ref 0–2)
BASOPHILS ABSOLUTE: 0.03 K/UL (ref 0–0.2)
BILIRUB SERPL-MCNC: 0.4 MG/DL (ref 0.3–1.2)
BUN SERPL-MCNC: 13 MG/DL (ref 6–20)
CALCIUM SERPL-MCNC: 9.9 MG/DL (ref 8.6–10.4)
CHLORIDE SERPL-SCNC: 95 MMOL/L (ref 98–107)
CO2 SERPL-SCNC: 26 MMOL/L (ref 20–31)
CREAT SERPL-MCNC: 0.68 MG/DL (ref 0.5–0.9)
EOSINOPHILS RELATIVE PERCENT: 3 % (ref 1–4)
GFR SERPL CREATININE-BSD FRML MDRD: >60 ML/MIN/1.73M2
GLUCOSE SERPL-MCNC: 76 MG/DL (ref 70–99)
HAV AB SERPL IA-ACNC: NONREACTIVE
HBV SURFACE AB SERPL IA-ACNC: <3.5 MIU/ML
HCT VFR BLD AUTO: 44.7 % (ref 36.3–47.1)
HCV AB SER QL: REACTIVE
HGB BLD-MCNC: 15 G/DL (ref 11.9–15.1)
IMMATURE GRANULOCYTES: 0 %
LYMPHOCYTES # BLD: 35 % (ref 24–43)
MCH RBC QN AUTO: 31.4 PG (ref 25.2–33.5)
MCHC RBC AUTO-ENTMCNC: 33.6 G/DL (ref 28.4–34.8)
MCV RBC AUTO: 93.7 FL (ref 82.6–102.9)
MONOCYTES # BLD: 7 % (ref 3–12)
NRBC AUTOMATED: 0 PER 100 WBC
PDW BLD-RTO: 13.6 % (ref 11.8–14.4)
PLATELET # BLD AUTO: NORMAL K/UL (ref 138–453)
PLATELET, FLUORESCENCE: 146 K/UL (ref 138–453)
PLATELET, IMMATURE FRACTION: 11.3 % (ref 1.1–10.3)
POTASSIUM SERPL-SCNC: 4.1 MMOL/L (ref 3.7–5.3)
PROT SERPL-MCNC: 7.8 G/DL (ref 6.4–8.3)
RBC # BLD: 4.77 M/UL (ref 3.95–5.11)
SEG NEUTROPHILS: 55 % (ref 36–65)
SEGMENTED NEUTROPHILS ABSOLUTE COUNT: 3 K/UL (ref 1.5–8.1)
SODIUM SERPL-SCNC: 136 MMOL/L (ref 135–144)
WBC # BLD AUTO: 5.5 K/UL (ref 3.5–11.3)

## 2023-04-07 PROCEDURE — 99213 OFFICE O/P EST LOW 20 MIN: CPT | Performed by: NURSE PRACTITIONER

## 2023-04-07 RX ORDER — GABAPENTIN 400 MG/1
800 CAPSULE ORAL 3 TIMES DAILY
Qty: 180 CAPSULE | Refills: 2 | Status: SHIPPED | OUTPATIENT
Start: 2023-04-07 | End: 2023-05-07

## 2023-04-07 RX ORDER — URINE ACETONE TEST STRIPS
STRIP MISCELLANEOUS
Qty: 100 STRIP | Refills: 5 | Status: SHIPPED | OUTPATIENT
Start: 2023-04-07

## 2023-04-07 RX ORDER — FLUTICASONE PROPIONATE 50 MCG
2 SPRAY, SUSPENSION (ML) NASAL DAILY
Qty: 1 EACH | Refills: 5 | Status: SHIPPED | OUTPATIENT
Start: 2023-04-07

## 2023-04-07 RX ORDER — IBUPROFEN 800 MG/1
TABLET ORAL
Qty: 60 TABLET | Refills: 2 | Status: SHIPPED | OUTPATIENT
Start: 2023-04-07

## 2023-04-07 SDOH — ECONOMIC STABILITY: HOUSING INSECURITY
IN THE LAST 12 MONTHS, WAS THERE A TIME WHEN YOU DID NOT HAVE A STEADY PLACE TO SLEEP OR SLEPT IN A SHELTER (INCLUDING NOW)?: NO

## 2023-04-07 SDOH — ECONOMIC STABILITY: FOOD INSECURITY: WITHIN THE PAST 12 MONTHS, YOU WORRIED THAT YOUR FOOD WOULD RUN OUT BEFORE YOU GOT MONEY TO BUY MORE.: NEVER TRUE

## 2023-04-07 SDOH — ECONOMIC STABILITY: FOOD INSECURITY: WITHIN THE PAST 12 MONTHS, THE FOOD YOU BOUGHT JUST DIDN'T LAST AND YOU DIDN'T HAVE MONEY TO GET MORE.: NEVER TRUE

## 2023-04-07 SDOH — ECONOMIC STABILITY: INCOME INSECURITY: HOW HARD IS IT FOR YOU TO PAY FOR THE VERY BASICS LIKE FOOD, HOUSING, MEDICAL CARE, AND HEATING?: NOT HARD AT ALL

## 2023-04-07 ASSESSMENT — ENCOUNTER SYMPTOMS
COUGH: 0
SHORTNESS OF BREATH: 0

## 2023-04-07 NOTE — PROGRESS NOTES
Rigoberto Soria (:  1985) is a 40 y.o. female,Established patient, here for evaluation of the following chief complaint(s): Anxiety and 3 Month Follow-Up      ASSESSMENT/PLAN:  1. Anxiety  -     gabapentin (NEURONTIN) 400 MG capsule; Take 2 capsules by mouth 3 times daily for 30 days. , Disp-180 capsule, R-2Normal  2. Medication refill  -     ibuprofen (ADVIL;MOTRIN) 800 MG tablet; TAKE ONE TABLET BY MOUTH EVERY 6 HOURS AS NEEDED FOR PAIN, Disp-60 tablet, R-2Normal  -     acetone, urine, test (KETOSTIX) strip; ONE - DAILY, Disp-100 strip, R-5Normal  -     fluticasone (FLONASE) 50 MCG/ACT nasal spray; 2 sprays by Nasal route daily, Disp-1 each, R-5Normal      No follow-ups on file. Subjective   SUBJECTIVE/OBJECTIVE:  Anxiety  Patient reports no chest pain, palpitations or shortness of breath. Review of Systems   Constitutional:  Negative for chills and fever. Respiratory:  Negative for cough and shortness of breath. Cardiovascular:  Negative for chest pain, palpitations and leg swelling. Objective     Vitals:    23 0735   BP: 120/70         Wt Readings from Last 3 Encounters:   23 166 lb (75.3 kg)   23 178 lb (80.7 kg)   10/03/22 172 lb (78 kg)     Physical Exam  Constitutional:       Appearance: She is well-developed. HENT:      Right Ear: External ear normal.      Left Ear: External ear normal.      Nose: Nose normal.   Cardiovascular:      Rate and Rhythm: Normal rate and regular rhythm. Heart sounds: Normal heart sounds, S1 normal and S2 normal.   Pulmonary:      Effort: Pulmonary effort is normal. No respiratory distress. Breath sounds: Normal breath sounds. Musculoskeletal:         General: No deformity. Normal range of motion. Cervical back: Full passive range of motion without pain and normal range of motion. Skin:     General: Skin is warm and dry.    Neurological:      Mental Status: She is alert and oriented to person, place, and

## 2023-04-17 ENCOUNTER — HOSPITAL ENCOUNTER (OUTPATIENT)
Dept: ULTRASOUND IMAGING | Facility: CLINIC | Age: 38
Discharge: HOME | End: 2023-04-19
Payer: MEDICAID

## 2023-04-17 DIAGNOSIS — B18.2 HEP C W/O COMA, CHRONIC (HCC): ICD-10-CM

## 2023-04-17 DIAGNOSIS — R76.8 HEPATITIS C ANTIBODY TEST POSITIVE: ICD-10-CM

## 2023-04-17 PROCEDURE — 76705 ECHO EXAM OF ABDOMEN: CPT

## 2023-04-19 ENCOUNTER — OFFICE VISIT (OUTPATIENT)
Dept: GASTROENTEROLOGY | Age: 38
End: 2023-04-19
Payer: MEDICAID

## 2023-04-19 VITALS
TEMPERATURE: 97.3 F | SYSTOLIC BLOOD PRESSURE: 118 MMHG | DIASTOLIC BLOOD PRESSURE: 69 MMHG | WEIGHT: 162 LBS | BODY MASS INDEX: 26.96 KG/M2 | HEART RATE: 73 BPM

## 2023-04-19 DIAGNOSIS — Z11.59 SCREENING FOR VIRAL DISEASE: ICD-10-CM

## 2023-04-19 DIAGNOSIS — B18.2 CHRONIC HEPATITIS C WITHOUT HEPATIC COMA (HCC): Primary | ICD-10-CM

## 2023-04-19 PROCEDURE — 99214 OFFICE O/P EST MOD 30 MIN: CPT | Performed by: INTERNAL MEDICINE

## 2023-04-19 ASSESSMENT — ENCOUNTER SYMPTOMS
ABDOMINAL PAIN: 0
ANAL BLEEDING: 0
CHOKING: 0
COUGH: 0
SHORTNESS OF BREATH: 0
VOMITING: 0
RECTAL PAIN: 0
WHEEZING: 0
CONSTIPATION: 0
ABDOMINAL DISTENTION: 0
TROUBLE SWALLOWING: 0
DIARRHEA: 0
BLOOD IN STOOL: 0
NAUSEA: 0

## 2023-04-19 NOTE — PROGRESS NOTES
GI CLINIC FOLLOW UP    INTERVAL HISTORY:   No referring provider defined for this encounter. Chief Complaint   Patient presents with    Hepatitis C     Patient was last seen in 2021 for Hep C. She has not been treated. She had recent lab work done indicating current infection. She will need a couple more labs to complete then we can send in for treatment. HISTORY OF PRESENT ILLNESS: Raven Ahmadi is a 40 y.o. female , referred for evaluation of hep C , constipation      Here for f/u    Lost f/u    No compliance    No constipation any more   Last visit   We ordered US and labs  and colonosocpy    Did not do the colonosocpy   Did the labs and US     Hep C 1a1b   Positive RNA   F2  Normal AFP  Negative HIV/HEP B markers       Last visit also she did have nausea and constipation for which we scheduled for EGD colonoscopy she did not do those  But today she said her symptoms are all resolved  She is having regular bowel movements now and she has no nausea no weight loss no abdominal pain no other symptoms    Past Medical,Family, and Social History reviewed and does contribute to the patient presentingcondition. I did review all the labs results available for the labs which were ordered by the primary care physician, and the other consultants, we search on epic at [facility] and all the available care everywhere epic    I did review all the imaging studies of the abdomen available on EMR, ordered by the primary care physician and the other consultant    I did review all the pathology from the biopsies done on the previous endoscopies    Patient's PMH/PSH,SH,PSYCH Hx, MEDs, ALLERGIES, and ROS were all reviewed and updated in the appropriate sections.     PAST MEDICAL HISTORY:  Past Medical History:   Diagnosis Date    No active medical problems     Opioid abuse, in remission [facility])        Past Surgical History:   Procedure Laterality Date    CYST REMOVAL      RT wrist    HERNIA REPAIR      ROLANDO

## 2023-04-24 ENCOUNTER — HOSPITAL ENCOUNTER (OUTPATIENT)
Age: 38
Setting detail: SPECIMEN
Discharge: HOME | End: 2023-04-24

## 2023-04-24 DIAGNOSIS — B18.2 CHRONIC HEPATITIS C WITHOUT HEPATIC COMA (HCC): ICD-10-CM

## 2023-04-24 DIAGNOSIS — Z11.59 SCREENING FOR VIRAL DISEASE: ICD-10-CM

## 2023-04-24 LAB
ALT SERPL-CCNC: 3.8 UG/L
HBV CORE AB SER QL: NONREACTIVE
HBV SURFACE AG SER QL: NONREACTIVE
HIV 1+2 AB+HIV1 P24 AG SERPL QL IA: NONREACTIVE

## 2023-04-28 ENCOUNTER — PATIENT MESSAGE (OUTPATIENT)
Dept: GASTROENTEROLOGY | Age: 38
End: 2023-04-28

## 2023-05-17 ENCOUNTER — PATIENT MESSAGE (OUTPATIENT)
Dept: FAMILY MEDICINE CLINIC | Age: 38
End: 2023-05-17

## 2023-06-07 ENCOUNTER — PATIENT MESSAGE (OUTPATIENT)
Dept: FAMILY MEDICINE CLINIC | Age: 38
End: 2023-06-07

## 2023-07-03 ENCOUNTER — TELEPHONE (OUTPATIENT)
Dept: FAMILY MEDICINE CLINIC | Age: 38
End: 2023-07-03

## 2023-07-03 ENCOUNTER — PATIENT MESSAGE (OUTPATIENT)
Dept: FAMILY MEDICINE CLINIC | Age: 38
End: 2023-07-03

## 2023-07-03 RX ORDER — ONDANSETRON 4 MG/1
4 TABLET, ORALLY DISINTEGRATING ORAL 3 TIMES DAILY PRN
Qty: 21 TABLET | Refills: 1 | Status: SHIPPED | OUTPATIENT
Start: 2023-07-03

## 2023-07-11 DIAGNOSIS — B18.2 HEP C W/O COMA, CHRONIC (HCC): ICD-10-CM

## 2023-07-11 DIAGNOSIS — F41.9 ANXIETY: ICD-10-CM

## 2023-07-11 DIAGNOSIS — Z76.0 MEDICATION REFILL: ICD-10-CM

## 2023-07-11 RX ORDER — IBUPROFEN 800 MG/1
TABLET ORAL
Qty: 60 TABLET | Refills: 2 | Status: SHIPPED | OUTPATIENT
Start: 2023-07-11

## 2023-07-11 RX ORDER — GABAPENTIN 400 MG/1
800 CAPSULE ORAL 3 TIMES DAILY
Qty: 180 CAPSULE | Refills: 0 | Status: SHIPPED | OUTPATIENT
Start: 2023-07-11 | End: 2023-08-10

## 2023-07-11 RX ORDER — VELPATASVIR AND SOFOSBUVIR 100; 400 MG/1; MG/1
1 TABLET, FILM COATED ORAL DAILY
Qty: 28 TABLET | Refills: 2 | OUTPATIENT
Start: 2023-07-11

## 2023-07-11 NOTE — TELEPHONE ENCOUNTER
Last visit: 04/07/23  Last Med refill: 06/23  Does patient have enough medication for 72 hours: No:     Next Visit Date:  Future Appointments   Date Time Provider [address]   8/14/2023  8:15 AM KODAK Pringle CNP [facility] [address] Maintenance   Topic Date Due    Hepatitis A vaccine (1 of 2 - Risk 2-dose series) Never done    Varicella vaccine (1 of 2 - 2-dose childhood series) Never done    Pneumococcal 0-64 years Vaccine (1 - PCV) Never done    Hepatitis B vaccine (1 of 3 - Risk 3-dose series) Never done    Cervical cancer screen  Never done    COVID-19 Vaccine (3 - Booster for Pfizer series) 06/24/2021    DTaP/Tdap/Td vaccine (1 - Tdap) 06/24/2024 (Originally 8/9/2004)    Flu vaccine (1) 08/01/2023    Depression Screen  01/06/2024    Hepatitis C screen  Completed    HIV screen  Completed    Hib vaccine  Aged Out    Meningococcal (ACWY) vaccine  Aged Out    Diabetes screen  Discontinued       Hemoglobin A1C (%)   Date Value   07/05/2022 4.9             ( goal A1C is < 7)   No results found for: LABMICR  No results found for: LDLCHOLESTEROL, LDLCALC    (goal LDL is <100)   AST (U/L)   Date Value   04/07/2023 40 (H)     ALT (U/L)   Date Value   04/07/2023 64 (H)     BUN (mg/dL)   Date Value   04/07/2023 13     BP Readings from Last 3 Encounters:   04/19/23 118/69   04/07/23 120/70   01/06/23 120/60          (goal 120/80)    All Future Testing planned in [facility]  Lab Frequency Next Occurrence   FELIX DIGITAL DIAGNOSTIC W OR WO CAD RIGHT Once 08/24/2022   FELIX DIGITAL DIAGNOSTIC W OR WO CAD BILATERAL Once 08/31/2022               Patient Active Problem List:     Constipation     Endometriosis     Nicotine dependence     Panic attack     Hepatitis C antibody test positive     Drug abuse in remission ([address])

## 2023-08-14 ENCOUNTER — OFFICE VISIT (OUTPATIENT)
Dept: FAMILY MEDICINE CLINIC | Age: 38
End: 2023-08-14
Payer: COMMERCIAL

## 2023-08-14 VITALS
DIASTOLIC BLOOD PRESSURE: 60 MMHG | HEART RATE: 59 BPM | BODY MASS INDEX: 25.13 KG/M2 | SYSTOLIC BLOOD PRESSURE: 110 MMHG | WEIGHT: 151 LBS | OXYGEN SATURATION: 97 %

## 2023-08-14 DIAGNOSIS — Z76.0 MEDICATION REFILL: ICD-10-CM

## 2023-08-14 DIAGNOSIS — F41.9 ANXIETY: Primary | ICD-10-CM

## 2023-08-14 PROCEDURE — 4004F PT TOBACCO SCREEN RCVD TLK: CPT | Performed by: NURSE PRACTITIONER

## 2023-08-14 PROCEDURE — G8427 DOCREV CUR MEDS BY ELIG CLIN: HCPCS | Performed by: NURSE PRACTITIONER

## 2023-08-14 PROCEDURE — 99213 OFFICE O/P EST LOW 20 MIN: CPT | Performed by: NURSE PRACTITIONER

## 2023-08-14 PROCEDURE — G8417 CALC BMI ABV UP PARAM F/U: HCPCS | Performed by: NURSE PRACTITIONER

## 2023-08-14 RX ORDER — ONDANSETRON 4 MG/1
4 TABLET, ORALLY DISINTEGRATING ORAL 3 TIMES DAILY PRN
Qty: 21 TABLET | Refills: 1 | Status: SHIPPED | OUTPATIENT
Start: 2023-08-14

## 2023-08-14 RX ORDER — LORATADINE 10 MG/1
10 TABLET ORAL DAILY
Qty: 90 TABLET | Refills: 1 | Status: SHIPPED | OUTPATIENT
Start: 2023-08-14

## 2023-08-14 RX ORDER — GABAPENTIN 400 MG/1
800 CAPSULE ORAL 3 TIMES DAILY
Qty: 180 CAPSULE | Refills: 2 | Status: SHIPPED | OUTPATIENT
Start: 2023-08-14 | End: 2023-09-13

## 2023-08-14 ASSESSMENT — ENCOUNTER SYMPTOMS
COUGH: 0
SHORTNESS OF BREATH: 0

## 2023-08-14 NOTE — PROGRESS NOTES
Ryan Soria (:  1985) is a 45 y.o. female,Established patient, here for evaluation of the following chief complaint(s):  3 Month Follow-Up         ASSESSMENT/PLAN:  1. Anxiety  -     gabapentin (NEURONTIN) 400 MG capsule; Take 2 capsules by mouth 3 times daily for 30 days. , Disp-180 capsule, R-2Normal  2. Medication refill  -     loratadine (CLARITIN) 10 MG tablet; Take 1 tablet by mouth daily, Disp-90 tablet, R-1Normal  Stable continue medication      No follow-ups on file. Subjective   SUBJECTIVE/OBJECTIVE:  HPI    Review of Systems   Constitutional:  Negative for chills and fever. Respiratory:  Negative for cough and shortness of breath. Cardiovascular:  Negative for chest pain, palpitations and leg swelling. Objective   Vitals:    23 0817   BP: 110/60   Pulse: 59   SpO2: 97%     Physical Exam  Constitutional:       Appearance: She is well-developed. HENT:      Right Ear: External ear normal.      Left Ear: External ear normal.      Nose: Nose normal.   Cardiovascular:      Rate and Rhythm: Normal rate and regular rhythm. Heart sounds: Normal heart sounds, S1 normal and S2 normal.   Pulmonary:      Effort: Pulmonary effort is normal. No respiratory distress. Breath sounds: Normal breath sounds. Musculoskeletal:         General: No deformity. Normal range of motion. Cervical back: Full passive range of motion without pain and normal range of motion. Skin:     General: Skin is warm and dry. Neurological:      Mental Status: She is alert and oriented to person, place, and time. An electronic signature was used to authenticate this note.     --Tameka Stapleton, KODAK - CNP

## 2023-09-11 ENCOUNTER — PATIENT MESSAGE (OUTPATIENT)
Dept: GASTROENTEROLOGY | Age: 38
End: 2023-09-11

## 2023-09-11 DIAGNOSIS — Z11.59 SCREENING FOR VIRAL DISEASE: ICD-10-CM

## 2023-09-11 DIAGNOSIS — B18.2 CHRONIC HEPATITIS C WITHOUT HEPATIC COMA (HCC): Primary | ICD-10-CM

## 2023-09-12 DIAGNOSIS — Z76.0 MEDICATION REFILL: ICD-10-CM

## 2023-09-12 NOTE — TELEPHONE ENCOUNTER
From: Candice Mandujano  To: Dr. Sharia Merlin  Sent: 9/11/2023 8:57 AM EDT  Subject: Pregnant    Hi. I am currently on the HepC treatment. I have about 8 days left to complete the pills. I found out yesterday that I am pregnant. Should I continue to take the pills? Is this treatment bad for a fetus? Thank you?

## 2023-09-12 NOTE — TELEPHONE ENCOUNTER
Devaughn Dandy is calling to request a refill on the following medication(s):    Medication Request:  Requested Prescriptions     Pending Prescriptions Disp Refills    BLISOVI FE 1/20 1-20 MG-MCG per tablet [Pharmacy Med Name: BLISOVI FE 21 TAB 1 MG-20 MCG] 84 tablet      Sig: TAKE ONE TABLET BY MOUTH DAILY    ondansetron (ZOFRAN-ODT) 4 MG disintegrating tablet [Pharmacy Med Name: ONDANSETRON ODT 4 MG TABLET] 21 tablet 1     Sig: PLACE 1 TABLET BY MOUTH 3 TIMES A DAY AS NEEDED FOR NAUSEA AND/OR VOMITING       Last Visit Date (If Applicable):  6/70/9421    Next Visit Date:    11/14/2023

## 2023-09-13 ENCOUNTER — HOSPITAL ENCOUNTER (OUTPATIENT)
Age: 38
Setting detail: SPECIMEN
Discharge: HOME | End: 2023-09-13

## 2023-09-13 ENCOUNTER — OFFICE VISIT (OUTPATIENT)
Dept: OBGYN CLINIC | Age: 38
End: 2023-09-13
Payer: COMMERCIAL

## 2023-09-13 ENCOUNTER — TELEPHONE (OUTPATIENT)
Dept: OBGYN CLINIC | Age: 38
End: 2023-09-13

## 2023-09-13 VITALS
HEIGHT: 65 IN | SYSTOLIC BLOOD PRESSURE: 100 MMHG | DIASTOLIC BLOOD PRESSURE: 58 MMHG | WEIGHT: 150 LBS | BODY MASS INDEX: 24.99 KG/M2

## 2023-09-13 DIAGNOSIS — B18.2 CHRONIC HEPATITIS C WITHOUT HEPATIC COMA (HCC): ICD-10-CM

## 2023-09-13 DIAGNOSIS — O09.529 ANTEPARTUM MULTIGRAVIDA OF ADVANCED MATERNAL AGE: ICD-10-CM

## 2023-09-13 DIAGNOSIS — N92.6 MISSED MENSES: ICD-10-CM

## 2023-09-13 DIAGNOSIS — Z72.0 VAPES NICOTINE CONTAINING SUBSTANCE: ICD-10-CM

## 2023-09-13 DIAGNOSIS — B19.20 HEPATITIS C VIRUS INFECTION WITHOUT HEPATIC COMA, UNSPECIFIED CHRONICITY: ICD-10-CM

## 2023-09-13 DIAGNOSIS — Z11.59 SCREENING FOR VIRAL DISEASE: ICD-10-CM

## 2023-09-13 DIAGNOSIS — N92.6 MISSED MENSES: Primary | ICD-10-CM

## 2023-09-13 LAB
B-HCG SERPL EIA 3RD IS-ACNC: 1325 MIU/ML
CONTROL: ABNORMAL
HCV AB SERPL QL IA: REACTIVE
PREGNANCY TEST URINE, POC: POSITIVE

## 2023-09-13 PROCEDURE — 81025 URINE PREGNANCY TEST: CPT | Performed by: NURSE PRACTITIONER

## 2023-09-13 PROCEDURE — G8427 DOCREV CUR MEDS BY ELIG CLIN: HCPCS | Performed by: NURSE PRACTITIONER

## 2023-09-13 PROCEDURE — 99203 OFFICE O/P NEW LOW 30 MIN: CPT | Performed by: NURSE PRACTITIONER

## 2023-09-13 PROCEDURE — 4004F PT TOBACCO SCREEN RCVD TLK: CPT | Performed by: NURSE PRACTITIONER

## 2023-09-13 PROCEDURE — G8420 CALC BMI NORM PARAMETERS: HCPCS | Performed by: NURSE PRACTITIONER

## 2023-09-13 RX ORDER — VITAMIN A ACETATE, .BETA.-CAROTENE, ASCORBIC ACID, CHOLECALCIFEROL, .ALPHA.-TOCOPHEROL ACETATE, DL-, THIAMINE MONONITRATE, RIBOFLAVIN, NIACINAMIDE, PYRIDOXINE HYDROCHLORIDE, FOLIC ACID, CYANOCOBALAMIN, CALCIUM CARBONATE, FERROUS FUMARATE, ZINC OXIDE, AND CUPRIC OXIDE 2000; 2000; 120; 400; 22; 1.84; 3; 20; 10; 1; 12; 200; 27; 25; 2 [IU]/1; [IU]/1; MG/1; [IU]/1; MG/1; MG/1; MG/1; MG/1; MG/1; MG/1; UG/1; MG/1; MG/1; MG/1; MG/1
1 TABLET ORAL DAILY
Qty: 30 TABLET | Refills: 11 | Status: SHIPPED | OUTPATIENT
Start: 2023-09-13 | End: 2024-09-12

## 2023-09-13 RX ORDER — ONDANSETRON 4 MG/1
TABLET, ORALLY DISINTEGRATING ORAL
Qty: 21 TABLET | Refills: 1 | OUTPATIENT
Start: 2023-09-13

## 2023-09-13 RX ORDER — NORETHINDRONE ACETATE AND ETHINYL ESTRADIOL 1MG-20(21)
1 KIT ORAL DAILY
Qty: 84 TABLET | OUTPATIENT
Start: 2023-09-13

## 2023-09-13 NOTE — PROGRESS NOTES
Lela Cary tie  2023    YOB: 1985          The patient was seen today. She is here regarding missed menses. LMP 2023. . Hx SAB. Patient had to stop her Hep C treatment with infectious disease 6 days early d/t pregnancy. Not taking pnvs Her bowels are regular and she is voiding without difficulty.      HPI:  Elisa Villatoro is a 45 y.o. female  missed menses       OB History    Para Term  AB Living   1 0 0 0 1 0   SAB IAB Ectopic Molar Multiple Live Births   1 0 0 0 0 0      # Outcome Date GA Lbr Home/2nd Weight Sex Delivery Anes PTL Lv   1 SAB                Past Medical History:   Diagnosis Date    Hepatitis C virus     No active medical problems     Opioid abuse, in remission [facility])        Past Surgical History:   Procedure Laterality Date    CYST REMOVAL      RT wrist    HERNIA REPAIR      WISDOM TOOTH EXTRACTION         Family History   Problem Relation Age of Onset    Asthma Mother     Emphysema Mother     COPD Mother     Thyroid Disease Mother     Other Mother         pre colon cancer    Emphysema Father     COPD Father     Neuropathy Father        Social History     Socioeconomic History    Marital status: Single     Spouse name: Not on file    Number of children: Not on file    Years of education: Not on file    Highest education level: Not on file   Occupational History    Not on file   Tobacco Use    Smoking status: Every Day     Packs/day: 0.50     Years: 12.00     Additional pack years: 0.00     Total pack years: 6.00     Types: Cigarettes    Smokeless tobacco: Never   Vaping Use    Vaping Use: Every day    Substances: Nicotine    Devices: Disposable   Substance and Sexual Activity    Alcohol use: No    Drug use: Not Currently     Comment: sober 4.5 yrs 10/14/21    Sexual activity: Yes     Partners: Female   Other Topics Concern    Not on file   Social History Narrative    Not on file     Social Determinants of Health     Financial Resource Strain:

## 2023-09-14 ENCOUNTER — TELEPHONE (OUTPATIENT)
Dept: OBGYN CLINIC | Age: 38
End: 2023-09-14

## 2023-09-14 DIAGNOSIS — Z34.90 EARLY STAGE OF PREGNANCY: Primary | ICD-10-CM

## 2023-09-14 NOTE — TELEPHONE ENCOUNTER
Left detailed message for pt regarding + quant  Repeat quant  in 1 week for trending  Determine LMP/8/14/23  Pt currently taking PNV. Pt instructed to contact office Patient will need scheduled for NOB.

## 2023-09-14 NOTE — TELEPHONE ENCOUNTER
----- Message from KODAK Rosales NP sent at 9/14/2023  9:21 AM EDT -----  + quant  Repeat quant  in 1 week for trending  Determine LMP  Order PNVs  Patient will need scheduled for NOB

## 2023-09-14 NOTE — TELEPHONE ENCOUNTER
----- Message from KODAK Eaton NP sent at 9/14/2023  9:21 AM EDT -----  + quant  Repeat quant  in 1 week for trending  Determine LMP  Order PNVs  Patient will need scheduled for NOB

## 2023-09-14 NOTE — TELEPHONE ENCOUNTER
Patient notified of results and recommendations, patient has PNV. Order in epic for repeat hcg in 1 week.

## 2023-09-15 LAB
HCV RNA # SERPL NAA+PROBE: NOT DETECTED {COPIES}/ML
SPECIMEN SOURCE: NORMAL

## 2023-09-15 NOTE — TELEPHONE ENCOUNTER
Vivienne Sanchez is calling to request a refill on the following medication(s):    Medication Request:  Requested Prescriptions     Pending Prescriptions Disp Refills    ondansetron (ZOFRAN-ODT) 4 MG disintegrating tablet [Pharmacy Med Name: ONDANSETRON ODT 4 MG TABLET] 21 tablet 1     Sig: PLACE 1 TABLET BY MOUTH 3 TIMES A DAY AS NEEDED FOR NAUSEA AND/OR VOMITING       Last Visit Date (If Applicable):  7/88/4553    Next Visit Date:    11/14/2023

## 2023-09-19 LAB — HCV GENTYP SERPL NAA+PROBE: NORMAL

## 2023-09-20 ENCOUNTER — HOSPITAL ENCOUNTER (OUTPATIENT)
Age: 38
Setting detail: SPECIMEN
Discharge: HOME | End: 2023-09-20

## 2023-09-20 DIAGNOSIS — Z34.90 EARLY STAGE OF PREGNANCY: ICD-10-CM

## 2023-09-20 LAB — B-HCG SERPL EIA 3RD IS-ACNC: ABNORMAL MIU/ML

## 2023-09-20 RX ORDER — ONDANSETRON 4 MG/1
TABLET, ORALLY DISINTEGRATING ORAL
Qty: 21 TABLET | Refills: 1 | Status: SHIPPED | OUTPATIENT
Start: 2023-09-20

## 2023-09-20 RX ORDER — ONDANSETRON 4 MG/1
4 TABLET, ORALLY DISINTEGRATING ORAL 3 TIMES DAILY PRN
Qty: 21 TABLET | Refills: 1 | OUTPATIENT
Start: 2023-09-20

## 2023-09-20 NOTE — TELEPHONE ENCOUNTER
Elba Jacome is calling to request a refill on the following medication(s):    Medication Request:  Requested Prescriptions     Pending Prescriptions Disp Refills    ondansetron (ZOFRAN-ODT) 4 MG disintegrating tablet 21 tablet 1     Sig: Take 1 tablet by mouth 3 times daily as needed for Nausea or Vomiting       Last Visit Date (If Applicable):  3/99/4846    Next Visit Date:    11/14/2023

## 2023-09-21 ENCOUNTER — TELEPHONE (OUTPATIENT)
Dept: OBGYN CLINIC | Age: 38
End: 2023-09-21

## 2023-09-21 DIAGNOSIS — Z34.90 EARLY STAGE OF PREGNANCY: Primary | ICD-10-CM

## 2023-09-21 NOTE — TELEPHONE ENCOUNTER
Per Shelly Hannon NP, pt notified of increasing quant; LMP 8/14/23. Pt currently taking PNV. Call sent to [facility] to schedule new OB intake/US. Pt verbalized understanding.

## 2023-09-21 NOTE — TELEPHONE ENCOUNTER
----- Message from KODAK Villeda NP sent at 9/21/2023  8:21 AM EDT -----  Williams ames  Please schedule for NOB  Make sure has PNVs

## 2023-09-29 DIAGNOSIS — Z76.0 MEDICATION REFILL: ICD-10-CM

## 2023-09-29 RX ORDER — IBUPROFEN 800 MG/1
TABLET ORAL
Qty: 60 TABLET | Refills: 2 | OUTPATIENT
Start: 2023-09-29

## 2023-09-29 NOTE — TELEPHONE ENCOUNTER
The original prescription was discontinued on 9/13/2023 by Toribio Mortensen for the following reason: Therapy completed. Renewing this prescription may not be appropriate.

## 2023-10-13 ENCOUNTER — PATIENT MESSAGE (OUTPATIENT)
Dept: OBGYN CLINIC | Age: 38
End: 2023-10-13

## 2023-10-13 RX ORDER — ONDANSETRON 4 MG/1
TABLET, ORALLY DISINTEGRATING ORAL
Qty: 21 TABLET | Refills: 1 | Status: SHIPPED | OUTPATIENT
Start: 2023-10-13

## 2023-10-13 NOTE — TELEPHONE ENCOUNTER
From: Mariusz Lang  To: Mini Babin  Sent: 10/13/2023 8:55 AM EDT  Subject: Prescription     Good morning. I had to just reschedule my appointment because of a death in the family. I am now scheduled for the 15th. I have severe morning sickness and nausea throughout the day. My family practice office had called in [address] But they ask now that I have ask my OBGYN to prescribe it. I have 4 left. Could you please send me a prescription for Zofran to the pharmacy? ([facility] on [address] phone: [phone]). My family physician office sent over 21 tabs with a refill about a month ago. I have already gotten the refill. I have been using about 2-3 a day   on bad days. I apologize for the inconvenience. Thank you very much for your time.      Benedicto Cota

## 2023-10-25 ENCOUNTER — PROCEDURE VISIT (OUTPATIENT)
Dept: OBGYN CLINIC | Age: 38
End: 2023-10-25
Payer: COMMERCIAL

## 2023-10-25 ENCOUNTER — HOSPITAL ENCOUNTER (OUTPATIENT)
Age: 38
Setting detail: SPECIMEN
Discharge: HOME | End: 2023-10-25

## 2023-10-25 DIAGNOSIS — Z34.90 EARLY STAGE OF PREGNANCY: ICD-10-CM

## 2023-10-25 LAB
ABDOMINAL CIRCUMFERENCE: NORMAL
ABO + RH BLD: NORMAL
B-HCG SERPL EIA 3RD IS-ACNC: ABNORMAL MIU/ML
BASOPHILS # BLD: <0.03 K/UL (ref 0–0.2)
BASOPHILS NFR BLD: 0 % (ref 0–2)
BIPARIETAL DIAMETER: NORMAL
BLOOD GROUP ANTIBODIES SERPL: NEGATIVE
CRL: NORMAL
EOSINOPHIL # BLD: 0.17 K/UL (ref 0–0.44)
EOSINOPHILS RELATIVE PERCENT: 3 % (ref 1–4)
ERYTHROCYTE [DISTWIDTH] IN BLOOD BY AUTOMATED COUNT: 12.9 % (ref 11.8–14.4)
ESTIMATED FETAL WEIGHT: NORMAL
FEMORAL DIAMETER: NORMAL
HC/AC: NORMAL
HCT VFR BLD AUTO: 38.6 % (ref 36.3–47.1)
HEAD CIRCUMFERENCE: NORMAL
HGB BLD-MCNC: 12.7 G/DL (ref 11.9–15.1)
IMM GRANULOCYTES # BLD AUTO: <0.03 K/UL (ref 0–0.3)
IMM GRANULOCYTES NFR BLD: 0 %
LYMPHOCYTES NFR BLD: 2.96 K/UL (ref 1.1–3.7)
LYMPHOCYTES RELATIVE PERCENT: 47 % (ref 24–43)
MCH RBC QN AUTO: 31.2 PG (ref 25.2–33.5)
MCHC RBC AUTO-ENTMCNC: 32.9 G/DL (ref 28.4–34.8)
MCV RBC AUTO: 94.8 FL (ref 82.6–102.9)
MONOCYTES NFR BLD: 0.28 K/UL (ref 0.1–1.2)
MONOCYTES NFR BLD: 4 % (ref 3–12)
NEUTROPHILS NFR BLD: 46 % (ref 36–65)
NEUTS SEG NFR BLD: 2.88 K/UL (ref 1.5–8.1)
NRBC BLD-RTO: 0 PER 100 WBC
PLATELET # BLD AUTO: 163 K/UL (ref 138–453)
PMV BLD AUTO: 11.9 FL (ref 8.1–13.5)
RBC # BLD AUTO: 4.07 M/UL (ref 3.95–5.11)
SAC DIAMETER: NORMAL
WBC OTHER # BLD: 6.3 K/UL (ref 3.5–11.3)

## 2023-10-25 PROCEDURE — 76817 TRANSVAGINAL US OBSTETRIC: CPT | Performed by: OBSTETRICS & GYNECOLOGY

## 2023-10-25 PROCEDURE — 99999 PR OFFICE/OUTPT VISIT,PROCEDURE ONLY: CPT | Performed by: OBSTETRICS & GYNECOLOGY

## 2023-10-25 PROCEDURE — 76801 OB US < 14 WKS SINGLE FETUS: CPT | Performed by: OBSTETRICS & GYNECOLOGY

## 2023-10-25 NOTE — PROGRESS NOTES
Patient instructed on S/S of miscarriage. To call office or go to ER with increase in vaginal bleeding, pelvic pain or fever. Instructed we will repeat OB ultrasound in 1 week to check for fetal viability . To complete bloodwork prior to coming to office.

## 2023-10-26 ENCOUNTER — TELEPHONE (OUTPATIENT)
Dept: OBGYN CLINIC | Age: 38
End: 2023-10-26

## 2023-10-26 DIAGNOSIS — Z34.90 EARLY STAGE OF PREGNANCY: Primary | ICD-10-CM

## 2023-10-26 NOTE — TELEPHONE ENCOUNTER
----- Message from KODAK Middleton NP sent at 10/26/2023  3:15 PM EDT -----  IUP at 8w1d  No FHTs  Scheduled for conformation on 11/1/2023 with u/s  Repeat quant in 48 hours   Review s/sx of SAB

## 2023-10-26 NOTE — TELEPHONE ENCOUNTER
Per Dylan Grimaldo pt notified of IUP at [address]  No FHTs  Scheduled for conformation on 11/1/2023 with u/s  Repeat quant in 48 hours/ordered in [facility]. Review s/sx of SAB discussed with patient. Pt voiced understanding to all results and recommendations.

## 2023-10-27 ENCOUNTER — HOSPITAL ENCOUNTER (OUTPATIENT)
Age: 38
Setting detail: SPECIMEN
Discharge: HOME | End: 2023-10-27

## 2023-10-27 DIAGNOSIS — Z34.90 EARLY STAGE OF PREGNANCY: ICD-10-CM

## 2023-10-27 LAB — B-HCG SERPL EIA 3RD IS-ACNC: ABNORMAL MIU/ML

## 2023-11-01 ENCOUNTER — OFFICE VISIT (OUTPATIENT)
Dept: OBGYN CLINIC | Age: 38
End: 2023-11-01

## 2023-11-01 ENCOUNTER — PROCEDURE VISIT (OUTPATIENT)
Dept: OBGYN CLINIC | Age: 38
End: 2023-11-01

## 2023-11-01 VITALS
SYSTOLIC BLOOD PRESSURE: 112 MMHG | BODY MASS INDEX: 23.99 KG/M2 | WEIGHT: 144 LBS | DIASTOLIC BLOOD PRESSURE: 66 MMHG | HEIGHT: 65 IN

## 2023-11-01 DIAGNOSIS — O09.521 MULTIGRAVIDA OF ADVANCED MATERNAL AGE IN FIRST TRIMESTER: ICD-10-CM

## 2023-11-01 DIAGNOSIS — O36.80X0 PREGNANCY WITH INCONCLUSIVE FETAL VIABILITY, SINGLE OR UNSPECIFIED FETUS: Primary | ICD-10-CM

## 2023-11-01 DIAGNOSIS — N92.6 MISSED MENSES: ICD-10-CM

## 2023-11-01 DIAGNOSIS — O02.1 MISSED AB: Primary | ICD-10-CM

## 2023-11-01 DIAGNOSIS — B19.20 HEPATITIS C VIRUS INFECTION WITHOUT HEPATIC COMA, UNSPECIFIED CHRONICITY: ICD-10-CM

## 2023-11-01 DIAGNOSIS — Z34.90 EARLY STAGE OF PREGNANCY: ICD-10-CM

## 2023-11-01 LAB
CRL: NORMAL
SAC DIAMETER: NORMAL

## 2023-11-01 RX ORDER — MISOPROSTOL 200 UG/1
TABLET ORAL
Qty: 15 TABLET | Refills: 0 | Status: SHIPPED | OUTPATIENT
Start: 2023-11-01

## 2023-11-01 RX ORDER — AMOXICILLIN 500 MG/1
CAPSULE ORAL
COMMUNITY
Start: 2023-10-24

## 2023-11-01 NOTE — PROGRESS NOTES
this procedure, fluid is removed by placing a long needle through the abdominal wall into the amniotic sac. The amniocentesis needle is typically guided into the sac with the help of ultrasound imaging. Once the needle is in the sac, a syringe is used to withdraw the clear amaya-colored amniotic fluid, which looks a bit like urine. NIPT, utilizes the maternal blood to test for fetal cells. These fetal cells are then karyotyped for genetic evaluation. All of these tests, CVS, NIPT and amniocentesis, let couples know if the fetus will have genetic abnormalities, and will help them make informed decisions regarding their pregnancy. Karyotyping by either CVS, NIPT or Amniocentesis is the ONLY way to confirm the genetic chromosomal structure regarding trisomy; Down's Syndrome, Edward's or Pateau's. [place]  [place] was reviewed. If NIPT is positive then a confirmatory amniocentesis would be recommended to confirm the diagnosis. [facility] guidelines were reviewed. Continue with Obstetrical/GYN visits as scheduled. See Dr. Buzz Sandhu 1 week The above findings and recommendations were reviewed with the patient today. She is amenable to the plan of care. Electronically signed by Zita Burch DO on 10/26/23 at 9:18 AM EDT     US OB LESS THAN 14 WEEKS SINGLE OR FIRST GESTATION    Result Date: 10/25/2023  See transvaginal ultrasound report from same day      Lab Results:  Results for orders placed or performed during the hospital encounter of 10/27/23   HCG, Quantitative, Pregnancy   Result Value Ref Range    hCG Quant 26,328.0 (H) <5 mIU/mL         Assessment:   Diagnosis Orders   1. Missed ab  HCG, Quantitative, Pregnancy    miSOPROStol (CYTOTEC) 200 MCG tablet      2. Early stage of pregnancy        3. Missed menses        4. Hepatitis C virus infection without hepatic coma, unspecified chronicity        5.  Multigravida of advanced maternal age in first trimester          Patient Active Problem List    Diagnosis Date Noted

## 2023-11-03 ENCOUNTER — HOSPITAL ENCOUNTER (OUTPATIENT)
Age: 38
Setting detail: SPECIMEN
Discharge: HOME | End: 2023-11-03

## 2023-11-03 DIAGNOSIS — Z34.90 EARLY STAGE OF PREGNANCY: ICD-10-CM

## 2023-11-03 LAB — B-HCG SERPL EIA 3RD IS-ACNC: 5659 MIU/ML

## 2023-11-06 ENCOUNTER — TELEPHONE (OUTPATIENT)
Dept: OBGYN CLINIC | Age: 38
End: 2023-11-06

## 2023-11-06 DIAGNOSIS — O02.1 MISSED AB: Primary | ICD-10-CM

## 2023-11-06 NOTE — TELEPHONE ENCOUNTER
----- Message from KODAK Oseguera NP sent at 11/6/2023  8:09 AM EST -----  Quant decreasing: SAB   Instruct to repeat quant every 2 weeks until negative  Instruct to abstain from intercourse until negative Quant  Instruct to wait 3 menstrual cycles before trying to conceive  Call office with increased pelvic pain, increased bleeding, fever

## 2023-11-07 ENCOUNTER — OFFICE VISIT (OUTPATIENT)
Dept: OBGYN CLINIC | Age: 38
End: 2023-11-07
Payer: COMMERCIAL

## 2023-11-07 ENCOUNTER — HOSPITAL ENCOUNTER (OUTPATIENT)
Age: 38
Setting detail: SPECIMEN
Discharge: HOME | End: 2023-11-07

## 2023-11-07 ENCOUNTER — PROCEDURE VISIT (OUTPATIENT)
Dept: OBGYN CLINIC | Age: 38
End: 2023-11-07
Payer: COMMERCIAL

## 2023-11-07 ENCOUNTER — PREP FOR PROCEDURE (OUTPATIENT)
Dept: OBGYN | Age: 38
End: 2023-11-07

## 2023-11-07 VITALS
WEIGHT: 144 LBS | BODY MASS INDEX: 23.99 KG/M2 | HEIGHT: 65 IN | SYSTOLIC BLOOD PRESSURE: 128 MMHG | DIASTOLIC BLOOD PRESSURE: 76 MMHG

## 2023-11-07 DIAGNOSIS — O02.1 MISSED AB: ICD-10-CM

## 2023-11-07 DIAGNOSIS — O02.1 MISSED AB: Primary | ICD-10-CM

## 2023-11-07 LAB
ALBUMIN SERPL-MCNC: 4.1 G/DL (ref 3.5–5.2)
ALBUMIN/GLOB SERPL: 1.5 {RATIO} (ref 1–2.5)
ALP SERPL-CCNC: 50 U/L (ref 35–104)
ALT SERPL-CCNC: 12 U/L (ref 5–33)
ANION GAP SERPL CALCULATED.3IONS-SCNC: 12 MMOL/L (ref 9–17)
AST SERPL-CCNC: 14 U/L
B-HCG SERPL EIA 3RD IS-ACNC: 2390 MIU/ML
BASOPHILS # BLD: <0.03 K/UL (ref 0–0.2)
BASOPHILS NFR BLD: 0 % (ref 0–2)
BILIRUB SERPL-MCNC: 0.2 MG/DL (ref 0.3–1.2)
BUN SERPL-MCNC: 8 MG/DL (ref 6–20)
CALCIUM SERPL-MCNC: 9.1 MG/DL (ref 8.6–10.4)
CHLORIDE SERPL-SCNC: 102 MMOL/L (ref 98–107)
CO2 SERPL-SCNC: 25 MMOL/L (ref 20–31)
CREAT SERPL-MCNC: 0.5 MG/DL (ref 0.5–0.9)
EOSINOPHIL # BLD: 0.18 K/UL (ref 0–0.44)
EOSINOPHILS RELATIVE PERCENT: 3 % (ref 1–4)
ERYTHROCYTE [DISTWIDTH] IN BLOOD BY AUTOMATED COUNT: 12.6 % (ref 11.8–14.4)
GFR SERPL CREATININE-BSD FRML MDRD: >60 ML/MIN/1.73M2
GLUCOSE SERPL-MCNC: 79 MG/DL (ref 70–99)
HCT VFR BLD AUTO: 36.7 % (ref 36.3–47.1)
HGB BLD-MCNC: 12.3 G/DL (ref 11.9–15.1)
IMM GRANULOCYTES # BLD AUTO: <0.03 K/UL (ref 0–0.3)
IMM GRANULOCYTES NFR BLD: 0 %
INR PPP: 1
LYMPHOCYTES NFR BLD: 2.84 K/UL (ref 1.1–3.7)
LYMPHOCYTES RELATIVE PERCENT: 47 % (ref 24–43)
MCH RBC QN AUTO: 31.8 PG (ref 25.2–33.5)
MCHC RBC AUTO-ENTMCNC: 33.5 G/DL (ref 28.4–34.8)
MCV RBC AUTO: 94.8 FL (ref 82.6–102.9)
MONOCYTES NFR BLD: 0.27 K/UL (ref 0.1–1.2)
MONOCYTES NFR BLD: 5 % (ref 3–12)
NEUTROPHILS NFR BLD: 45 % (ref 36–65)
NEUTS SEG NFR BLD: 2.72 K/UL (ref 1.5–8.1)
NRBC BLD-RTO: 0 PER 100 WBC
PARTIAL THROMBOPLASTIN TIME: 32.4 SEC (ref 23–36.5)
PLATELET # BLD AUTO: 144 K/UL (ref 138–453)
PMV BLD AUTO: 12.2 FL (ref 8.1–13.5)
POTASSIUM SERPL-SCNC: 3.9 MMOL/L (ref 3.7–5.3)
PROT SERPL-MCNC: 6.8 G/DL (ref 6.4–8.3)
PROTHROMBIN TIME: 13.2 SEC (ref 11.7–14.9)
RBC # BLD AUTO: 3.87 M/UL (ref 3.95–5.11)
SODIUM SERPL-SCNC: 139 MMOL/L (ref 135–144)
WBC OTHER # BLD: 6.1 K/UL (ref 3.5–11.3)

## 2023-11-07 PROCEDURE — G8420 CALC BMI NORM PARAMETERS: HCPCS | Performed by: OBSTETRICS & GYNECOLOGY

## 2023-11-07 PROCEDURE — G8427 DOCREV CUR MEDS BY ELIG CLIN: HCPCS | Performed by: OBSTETRICS & GYNECOLOGY

## 2023-11-07 PROCEDURE — 4004F PT TOBACCO SCREEN RCVD TLK: CPT | Performed by: OBSTETRICS & GYNECOLOGY

## 2023-11-07 PROCEDURE — G8484 FLU IMMUNIZE NO ADMIN: HCPCS | Performed by: OBSTETRICS & GYNECOLOGY

## 2023-11-07 PROCEDURE — 99213 OFFICE O/P EST LOW 20 MIN: CPT | Performed by: OBSTETRICS & GYNECOLOGY

## 2023-11-07 PROCEDURE — 76856 US EXAM PELVIC COMPLETE: CPT | Performed by: OBSTETRICS & GYNECOLOGY

## 2023-11-07 PROCEDURE — 99999 PR OFFICE/OUTPT VISIT,PROCEDURE ONLY: CPT | Performed by: OBSTETRICS & GYNECOLOGY

## 2023-11-07 RX ORDER — DOXYCYCLINE HYCLATE 100 MG
100 TABLET ORAL ONCE
Status: CANCELLED | OUTPATIENT
Start: 2023-11-07

## 2023-11-07 RX ORDER — SODIUM CHLORIDE 9 MG/ML
INJECTION, SOLUTION INTRAVENOUS PRN
Status: CANCELLED | OUTPATIENT
Start: 2023-11-07

## 2023-11-07 RX ORDER — SODIUM CHLORIDE 0.9 % (FLUSH) 0.9 %
5-40 SYRINGE (ML) INJECTION EVERY 12 HOURS SCHEDULED
Status: CANCELLED | OUTPATIENT
Start: 2023-11-07

## 2023-11-07 RX ORDER — SODIUM CHLORIDE 0.9 % (FLUSH) 0.9 %
5-40 SYRINGE (ML) INJECTION PRN
Status: CANCELLED | OUTPATIENT
Start: 2023-11-07

## 2023-11-07 RX ORDER — SODIUM CHLORIDE, SODIUM LACTATE, POTASSIUM CHLORIDE, CALCIUM CHLORIDE 600; 310; 30; 20 MG/100ML; MG/100ML; MG/100ML; MG/100ML
INJECTION, SOLUTION INTRAVENOUS CONTINUOUS
Status: CANCELLED | OUTPATIENT
Start: 2023-11-07

## 2023-11-07 NOTE — PROGRESS NOTES
EDT     US OB TRANSVAGINAL    Result Date: 10/25/2023  [address] Gynecology [address] [employer]; [address] [place], [address] [facility] [phone] mn [phone] Fax FIRST TRIMESTER ULTRASOUND REPORT EXAMINATION: PELVIC ULTRASOUND-First Trimester (<14 weeks) 10/26/2023 MRN: [id number] Contact Serial #: [id number] Cleria Trevino YOB: 1985 Age: 45 y.o. Performed By: Myla Sousa RDMS Attending: Kesha Dotson DO Referred By: Kesha Dotson  [employer], [facility]  [address] [employer] Provided: First Trimester Ultrasound (<14 weeks) Specific Ultrasound Imaging Technique Utilized: Transabdominal Approach: Yes Transvaginal Approach: No Comparison: none HISTORY & INDICATION: History: Naomi Reasons is a 45 y.o. female  EGA: Unknown OB History    T0    L0  SAB1  IAB0  Ectopic0  Molar0  Multiple0  Live Births0 Name of Baby 1: Not recorded   Date: Not recorded     GA: Not recorded     Delivery: Not recorded   Martha Borjasau: Not recorded     Apgar5: Not recorded   Living: Not recorded Indication/Reason for imaging exam: 1st trimester dating/viability Early stage of pregnancy Summary: The images were reviewed. The Ultrasound report is scanned and attached for specific findings and measurements. No LMP recorded. Gest Age     EDC LMP:           10w2d     2024 Ultrasound Today:  8w1d +/-5d   2024  (BEST)  IMPRESSION: 1. There is  a Single NON-viable intrauterine pregnancy. A yolk sac is not identified. A fetal Pole is identified with gestational Sac. 2. Pregnancy dating (SONIA) was completed today and confirmed by ultrasound. 3. CRL is 1.68 cm and FHR is NOT confirmed. No Cardiac Activity. 4. The patient had a QBHCG of 35,300 on 10/25/2023 5.  There were not any adnexal masses RECOMMENDATIONS: Order CBC Type and Screen and Quant BHCG in 5 days Review Signs and Symptoms of SAB AMA-Counseling Advanced Maternal Age Counseling If a woman is

## 2023-11-07 NOTE — H&P (VIEW-ONLY)
[address]  [address] [facility]; [address]  [place], [address]  [phone] mn     [phone] Fax           Dane Tracy  1985  Primary Care Physician: Hudson Hampton, APRN - [address] [facility]: [place]        2023  MEDICAID CONSENT COMPLETED: No        HPI: Dane Tracy is a 45 y.o. female   she is being seen for the problems listed below and is planning surgical intervention. She was counseled on all conservative medical and surgical options as well as definitive procedures as well. Pt states she has not bled since the 1 day prior to her sono. Pelvic sono done today showed sac unchanged in size. [facility] 11/3 5659. Pt did not take cytotec. Pt wishes to have a suction D&C. Pt was counseled on risks/ benefits/ alternative options. 1. Missed ab             Relevant Past History:         Patient Active Problem List     Diagnosis Date Noted    Hepatitis C virus 2023    Vapes nicotine containing substance 2023    Antepartum multigravida of advanced maternal age 2023       Advanced Maternal Age Counseling     If a woman is over the age of 28, she is considered to be of Advanced Maternal Age, and with this title comes risks for mom and baby. Increased risk of Down Syndrome - the most common chromosomal birth defect (chromosomes - cells that carry genes and transmit heredity information)   Increased risk of miscarriage   20% increase at ages 28 to 44   35% increase at ages 42-43   Over 50% increase by age 39  Increased risk of  Section () for delivery method   Gestational diabetes - diabetes that develops for the first time during pregnancy. Women who have this could possibly have a very large baby who then is at risk for injuries during delivery.    Pregnancy Induced Hypertension - High blood pressure   Placental Problems - one of the most common placental problems is placenta previa healthy foods. There are tests that all pregnant women are encouraged to take, but there is additional prenatal testing that is regularly offered to any woman 28 or older because of the potential of increased risks to the mother and baby. These tests are chorionic villus sampling (CVS) and amniocentesis. NIPT is also available which is a non-invasive option for fetal karyotyping. With CVS, a small sample of cells (called chorionic villi) is taken from the placenta where it attached to the wall of the uterus. Chorionic villi are tiny parts of the placenta; therefore they have the same genes as the baby. This test is 98% accurate, but can carry a slightly higher risk of miscarriage than amniocentesis, since the procedure is done in early pregnancy. Amniocentesis is a procedure where a sample of fluid is removed from the amniotic sac for analysis and evaluation. During this procedure, fluid is removed by placing a long needle through the abdominal wall into the amniotic sac. The amniocentesis needle is typically guided into the sac with the help of ultrasound imaging. Once the needle is in the sac, a syringe is used to withdraw the clear amaya-colored amniotic fluid, which looks a bit like urine. NIPT, utilizes the maternal blood to test for fetal cells. These fetal cells are then karyotyped for genetic evaluation. All of these tests, CVS, NIPT and amniocentesis, let couples know if the fetus will have genetic abnormalities, and will help them make informed decisions regarding their pregnancy. Karyotyping by either CVS, NIPT or Amniocentesis is the ONLY way to confirm the genetic chromosomal structure regarding trisomy; Down's Syndrome, Edward's or Pateau's. [facility] was reviewed. If NIPT is positive then a confirmatory amniocentesis would be recommended to confirm the diagnosis. [facility] guidelines were reviewed. Continue with Obstetrical/GYN visits as scheduled.  See Dr. Ondina Sr 1 week The above findings and The amniocentesis needle is typically guided into the sac with the help of ultrasound imaging. Once the needle is in the sac, a syringe is used to withdraw the clear amaya-colored amniotic fluid, which looks a bit like urine. NIPT, utilizes the maternal blood to test for fetal cells. These fetal cells are then karyotyped for genetic evaluation. All of these tests, CVS, NIPT and amniocentesis, let couples know if the fetus will have genetic abnormalities, and will help them make informed decisions regarding their pregnancy. Karyotyping by either CVS, NIPT or Amniocentesis is the ONLY way to confirm the genetic chromosomal structure regarding trisomy; Down's Syndrome, Edward's or Pateau's. [facility] was reviewed. If NIPT is positive then a confirmatory amniocentesis would be recommended to confirm the diagnosis. [facility] guidelines were reviewed. Drug abuse in remission ([address] [facility]) 06/04/2018    Hepatitis C antibody test positive 04/02/2018    Panic attack 01/02/2018    Constipation 10/22/2014    Endometriosis 10/22/2014    Nicotine dependence 10/22/2014         PAP: to be done at follow up   Permanent Sterilization Completed: No      Plan:  Suction D&C        Orders Placed This Encounter   Procedures    US NON OB TRANSVAGINAL       Standing Status:   Future       Standing Expiration Date:   11/7/2024       Order Specific Question:   Reason for exam:       Answer:   AVRIL    US PELVIS COMPLETE       Standing Status:   Future       Standing Expiration Date:   11/7/2024       Order Specific Question:   Reason for exam:       Answer:   AVRIL    HCG, Quantitative, Pregnancy       Standing Status:   Future       Standing Expiration Date:   11/6/2024    CBC with Auto Differential       Standing Status:   Future       Standing Expiration Date:   11/7/2024    Protime-INR       Standing Status:   Future       Standing Expiration Date:   11/6/2024       Order Specific Question:   Daily Coumadin Dose?        Answer:   N

## 2023-11-07 NOTE — H&P
[facility]  [address]; [address]  [place] [address]  [phone] mn     [phone] Fax           Shayy Soria  1985  Primary Care Physician: Agata Pratt APRN - CNP           HISTORY AND PHYSICAL        Hospital: [facility]        2023  MEDICAID CONSENT COMPLETED: No        HPI: Shayy Soria is a 38 y.o. female   she is being seen for the problems listed below and is planning surgical intervention. She was counseled on all conservative medical and surgical options as well as definitive procedures as well. Pt states she has not bled since the 1 day prior to her sono. Pelvic sono done today showed sac unchanged in size. [facility] 11/3 5659. Pt did not take cytotec. Pt wishes to have a suction D&C. Pt was counseled on risks/ benefits/ alternative options.      1. Missed ab             Relevant Past History:         Patient Active Problem List     Diagnosis Date Noted    Hepatitis C virus 2023    Vapes nicotine containing substance 2023    Antepartum multigravida of advanced maternal age 2023       Advanced Maternal Age Counseling     If a woman is over the age of 35, she is considered to be of Advanced Maternal Age, and with this title comes risks for mom and baby.   Increased risk of Down Syndrome - the most common chromosomal birth defect (chromosomes - cells that carry genes and transmit heredity information)   Increased risk of miscarriage   20% increase at ages 35 to 39   35% increase at ages 40-44   Over 50% increase by age 45  Increased risk of  Section () for delivery method   Gestational diabetes - diabetes that develops for the first time during pregnancy. Women who have this could possibly have a very large baby who then is at risk for injuries during delivery.   Pregnancy Induced Hypertension - High blood pressure   Placental Problems - one of the most common placental problems is placenta previa

## 2023-11-08 ENCOUNTER — HOSPITAL ENCOUNTER (OUTPATIENT)
Dept: PREADMISSION TESTING | Age: 38
End: 2023-11-08
Payer: COMMERCIAL

## 2023-11-08 VITALS — BODY MASS INDEX: 24.16 KG/M2 | HEIGHT: 65 IN | WEIGHT: 145 LBS

## 2023-11-08 DIAGNOSIS — F41.9 ANXIETY: ICD-10-CM

## 2023-11-08 RX ORDER — GABAPENTIN 400 MG/1
800 CAPSULE ORAL 3 TIMES DAILY
Qty: 180 CAPSULE | Refills: 2 | Status: SHIPPED | OUTPATIENT
Start: 2023-11-08 | End: 2024-02-06

## 2023-11-08 NOTE — PROGRESS NOTES
Pre-op Instructions For Out-Patient Surgery    Medication Instructions:  Please stop herbs and any supplements now (includes vitamins and minerals). Please contact your surgeon and prescribing physician for pre-op instructions for any blood thinners. Ibuprofen     If you have inhalers/aerosol treatments at home, please use them the morning of your surgery and bring the inhalers with you to the hospital.    Please take the following medications the morning of your surgery with a sip of water:    Jeannetta Boast will be taking her Methadone    Surgery Instructions:  After midnight before surgery:  Do not eat or drink anything, including water, mints, gum, and hard candy. You may brush your teeth without swallowing. No smoking, chewing tobacco, or street drugs. Please shower or bathe before surgery. Please do not wear any cologne, lotion, powder, deodorant, jewelry, piercings, perfume, makeup, nail polish, hair accessories, or hair spray on the day of surgery. Wear loose comfortable clothing. Leave your valuables at home but bring a payment source for any after-surgery prescriptions you plan to fill at [facility]. Bring a storage case for any glasses/contacts. An adult who is responsible for you MUST drive you home and should be with you for the first 24 hours after surgery. If having out-patient knee and foot surgeries, please arrange for planned crutches, walker, or wheelchair before arriving to the hospital.    The Day of Surgery:  Arrive at [facility] at the time directed by your surgeon and check in at the desk. If you have a living will or healthcare power of , please bring a copy. You will be taken to the pre-op holding area where you will be prepared for surgery. A physical assessment will be performed by a nurse practitioner or house officer.   Your IV will be started and you will meet your anesthesiologist.    When you go to surgery, your family will be directed to the surgical waiting room, where the doctor should speak with them after your surgery. After surgery, you will be taken to the recovery area. When you are alert and stable, you will receive instructions and be prepared for discharge. If you use a Bi-PAP or C-PAP machine, please bring it with you and leave it in the car in case it is needed in recovery room. Instructions reviewed with Og Fore and understanding verbalized.      11/10/23 D&C

## 2023-11-08 NOTE — TELEPHONE ENCOUNTER
Jodi Mahoney is calling to request a refill on the following medication(s):    Medication Request:  Requested Prescriptions     Pending Prescriptions Disp Refills    gabapentin (NEURONTIN) 400 MG capsule [Pharmacy Med Name: GABAPENTIN 400 MG CAPSULE] 180 capsule 2     Sig: Take 2 capsules by mouth 3 times daily.        Last Visit Date (If Applicable):  1/96/6422    Next Visit Date:    11/14/2023

## 2023-11-09 ENCOUNTER — ANESTHESIA EVENT (OUTPATIENT)
Dept: OPERATING ROOM | Age: 38
End: 2023-11-09
Payer: COMMERCIAL

## 2023-11-09 NOTE — PRE-PROCEDURE INSTRUCTIONS
No answer, left message ? Unable to leave message ? When were you told to arrive at hospital ?  0700    Do you have a  ? YES    Are you on any blood thinners ? NO              If yes when did you stop taking ? Do you have your prep Rx filled and instruction ? NA    Nothing to eat the day before , only clear liquids. Are you experiencing any covid symptoms ? NO    Do you have any infections or rash we should be aware of ? NO      Do you have the Hibiclens soap to use the night before and the morning of surgery ? NA    Nothing to eat or drink after midnight, only a sip of water to take any medication instructed to take the night before. Wear comfortable clothing, leave any valuables at home, remove any jewelry and body piercing INSTRUCTED.

## 2023-11-10 ENCOUNTER — ANESTHESIA (OUTPATIENT)
Dept: OPERATING ROOM | Age: 38
End: 2023-11-10
Payer: COMMERCIAL

## 2023-11-10 ENCOUNTER — HOSPITAL ENCOUNTER (OUTPATIENT)
Age: 38
Setting detail: OUTPATIENT SURGERY
Discharge: HOME | End: 2023-11-10
Attending: OBSTETRICS & GYNECOLOGY | Admitting: OBSTETRICS & GYNECOLOGY
Payer: COMMERCIAL

## 2023-11-10 VITALS
DIASTOLIC BLOOD PRESSURE: 60 MMHG | RESPIRATION RATE: 15 BRPM | OXYGEN SATURATION: 100 % | TEMPERATURE: 97 F | SYSTOLIC BLOOD PRESSURE: 105 MMHG | HEART RATE: 61 BPM

## 2023-11-10 DIAGNOSIS — Z98.890 POST-OPERATIVE STATE: Primary | ICD-10-CM

## 2023-11-10 PROCEDURE — 3600000002 HC SURGERY LEVEL 2 BASE: Performed by: OBSTETRICS & GYNECOLOGY

## 2023-11-10 PROCEDURE — 2500000003 HC RX 250 WO HCPCS: Performed by: NURSE ANESTHETIST, CERTIFIED REGISTERED

## 2023-11-10 PROCEDURE — 7100000011 HC PHASE II RECOVERY - ADDTL 15 MIN: Performed by: OBSTETRICS & GYNECOLOGY

## 2023-11-10 PROCEDURE — 3700000000 HC ANESTHESIA ATTENDED CARE: Performed by: OBSTETRICS & GYNECOLOGY

## 2023-11-10 PROCEDURE — 88305 TISSUE EXAM BY PATHOLOGIST: CPT

## 2023-11-10 PROCEDURE — 3600000012 HC SURGERY LEVEL 2 ADDTL 15MIN: Performed by: OBSTETRICS & GYNECOLOGY

## 2023-11-10 PROCEDURE — 6370000000 HC RX 637 (ALT 250 FOR IP): Performed by: ANESTHESIOLOGY

## 2023-11-10 PROCEDURE — 2709999900 HC NON-CHARGEABLE SUPPLY: Performed by: OBSTETRICS & GYNECOLOGY

## 2023-11-10 PROCEDURE — 6360000002 HC RX W HCPCS: Performed by: NURSE ANESTHETIST, CERTIFIED REGISTERED

## 2023-11-10 PROCEDURE — 7100000001 HC PACU RECOVERY - ADDTL 15 MIN: Performed by: OBSTETRICS & GYNECOLOGY

## 2023-11-10 PROCEDURE — 6360000002 HC RX W HCPCS

## 2023-11-10 PROCEDURE — 7100000030 HC ASPR PHASE II RECOVERY - FIRST 15 MIN: Performed by: OBSTETRICS & GYNECOLOGY

## 2023-11-10 PROCEDURE — 7100000010 HC PHASE II RECOVERY - FIRST 15 MIN: Performed by: OBSTETRICS & GYNECOLOGY

## 2023-11-10 PROCEDURE — 59820 CARE OF MISCARRIAGE: CPT | Performed by: OBSTETRICS & GYNECOLOGY

## 2023-11-10 PROCEDURE — 2580000003 HC RX 258: Performed by: OBSTETRICS & GYNECOLOGY

## 2023-11-10 PROCEDURE — 6370000000 HC RX 637 (ALT 250 FOR IP)

## 2023-11-10 PROCEDURE — 7100000000 HC PACU RECOVERY - FIRST 15 MIN: Performed by: OBSTETRICS & GYNECOLOGY

## 2023-11-10 PROCEDURE — 3700000001 HC ADD 15 MINUTES (ANESTHESIA): Performed by: OBSTETRICS & GYNECOLOGY

## 2023-11-10 PROCEDURE — 7100000031 HC ASPR PHASE II RECOVERY - ADDTL 15 MIN: Performed by: OBSTETRICS & GYNECOLOGY

## 2023-11-10 RX ORDER — SODIUM CHLORIDE 0.9 % (FLUSH) 0.9 %
5-40 SYRINGE (ML) INJECTION PRN
Status: DISCONTINUED | OUTPATIENT
Start: 2023-11-10 | End: 2023-11-10 | Stop reason: HOSPADM

## 2023-11-10 RX ORDER — LIDOCAINE HYDROCHLORIDE 10 MG/ML
1 INJECTION, SOLUTION EPIDURAL; INFILTRATION; INTRACAUDAL; PERINEURAL
Status: DISCONTINUED | OUTPATIENT
Start: 2023-11-10 | End: 2023-11-10 | Stop reason: HOSPADM

## 2023-11-10 RX ORDER — ONDANSETRON 2 MG/ML
4 INJECTION INTRAMUSCULAR; INTRAVENOUS ONCE
Status: COMPLETED | OUTPATIENT
Start: 2023-11-10 | End: 2023-11-10

## 2023-11-10 RX ORDER — ONDANSETRON 2 MG/ML
INJECTION INTRAMUSCULAR; INTRAVENOUS
Status: COMPLETED
Start: 2023-11-10 | End: 2023-11-10

## 2023-11-10 RX ORDER — ROCURONIUM BROMIDE 10 MG/ML
INJECTION, SOLUTION INTRAVENOUS PRN
Status: DISCONTINUED | OUTPATIENT
Start: 2023-11-10 | End: 2023-11-10 | Stop reason: SDUPTHER

## 2023-11-10 RX ORDER — METHYLERGONOVINE MALEATE 0.2 MG/ML
INJECTION INTRAVENOUS PRN
Status: DISCONTINUED | OUTPATIENT
Start: 2023-11-10 | End: 2023-11-10 | Stop reason: SDUPTHER

## 2023-11-10 RX ORDER — SODIUM CHLORIDE, SODIUM LACTATE, POTASSIUM CHLORIDE, CALCIUM CHLORIDE 600; 310; 30; 20 MG/100ML; MG/100ML; MG/100ML; MG/100ML
INJECTION, SOLUTION INTRAVENOUS CONTINUOUS
Status: DISCONTINUED | OUTPATIENT
Start: 2023-11-10 | End: 2023-11-10 | Stop reason: HOSPADM

## 2023-11-10 RX ORDER — SODIUM CHLORIDE 0.9 % (FLUSH) 0.9 %
5-40 SYRINGE (ML) INJECTION EVERY 12 HOURS SCHEDULED
Status: DISCONTINUED | OUTPATIENT
Start: 2023-11-10 | End: 2023-11-10 | Stop reason: HOSPADM

## 2023-11-10 RX ORDER — SODIUM CHLORIDE 9 MG/ML
INJECTION, SOLUTION INTRAVENOUS PRN
Status: DISCONTINUED | OUTPATIENT
Start: 2023-11-10 | End: 2023-11-10 | Stop reason: HOSPADM

## 2023-11-10 RX ORDER — LABETALOL HYDROCHLORIDE 5 MG/ML
10 INJECTION, SOLUTION INTRAVENOUS
Status: DISCONTINUED | OUTPATIENT
Start: 2023-11-10 | End: 2023-11-10 | Stop reason: HOSPADM

## 2023-11-10 RX ORDER — MIDAZOLAM HYDROCHLORIDE 1 MG/ML
INJECTION INTRAMUSCULAR; INTRAVENOUS PRN
Status: DISCONTINUED | OUTPATIENT
Start: 2023-11-10 | End: 2023-11-10 | Stop reason: SDUPTHER

## 2023-11-10 RX ORDER — ONDANSETRON 2 MG/ML
4 INJECTION INTRAMUSCULAR; INTRAVENOUS
Status: DISCONTINUED | OUTPATIENT
Start: 2023-11-10 | End: 2023-11-10 | Stop reason: HOSPADM

## 2023-11-10 RX ORDER — ACETAMINOPHEN 500 MG
1000 TABLET ORAL ONCE
Status: COMPLETED | OUTPATIENT
Start: 2023-11-10 | End: 2023-11-10

## 2023-11-10 RX ORDER — ACETAMINOPHEN 500 MG
1000 TABLET ORAL EVERY 6 HOURS PRN
Qty: 180 TABLET | Refills: 0 | Status: SHIPPED | OUTPATIENT
Start: 2023-11-10 | End: 2023-12-10

## 2023-11-10 RX ORDER — DOXYCYCLINE 100 MG/1
200 CAPSULE ORAL ONCE
Status: COMPLETED | OUTPATIENT
Start: 2023-11-10 | End: 2023-11-10

## 2023-11-10 RX ORDER — DOXYCYCLINE HYCLATE 100 MG
100 TABLET ORAL 2 TIMES DAILY
Qty: 14 TABLET | Refills: 0 | Status: SHIPPED | OUTPATIENT
Start: 2023-11-10 | End: 2023-11-17

## 2023-11-10 RX ORDER — ONDANSETRON 4 MG/1
4 TABLET, ORALLY DISINTEGRATING ORAL 3 TIMES DAILY PRN
Qty: 21 TABLET | Refills: 0 | Status: SHIPPED | OUTPATIENT
Start: 2023-11-10

## 2023-11-10 RX ORDER — DIPHENHYDRAMINE HYDROCHLORIDE 50 MG/ML
12.5 INJECTION INTRAMUSCULAR; INTRAVENOUS
Status: DISCONTINUED | OUTPATIENT
Start: 2023-11-10 | End: 2023-11-10 | Stop reason: HOSPADM

## 2023-11-10 RX ORDER — FENTANYL CITRATE 0.05 MG/ML
25 INJECTION, SOLUTION INTRAMUSCULAR; INTRAVENOUS EVERY 5 MIN PRN
Status: DISCONTINUED | OUTPATIENT
Start: 2023-11-10 | End: 2023-11-10 | Stop reason: HOSPADM

## 2023-11-10 RX ORDER — OXYTOCIN 10 [USP'U]/ML
INJECTION, SOLUTION INTRAMUSCULAR; INTRAVENOUS PRN
Status: DISCONTINUED | OUTPATIENT
Start: 2023-11-10 | End: 2023-11-10 | Stop reason: SDUPTHER

## 2023-11-10 RX ORDER — EPHEDRINE SULFATE/0.9% NACL/PF 50 MG/5 ML
SYRINGE (ML) INTRAVENOUS PRN
Status: DISCONTINUED | OUTPATIENT
Start: 2023-11-10 | End: 2023-11-10 | Stop reason: SDUPTHER

## 2023-11-10 RX ORDER — GABAPENTIN 300 MG/1
300 CAPSULE ORAL ONCE
Status: COMPLETED | OUTPATIENT
Start: 2023-11-10 | End: 2023-11-10

## 2023-11-10 RX ORDER — HYDROCODONE BITARTRATE AND ACETAMINOPHEN 5; 325 MG/1; MG/1
1 TABLET ORAL EVERY 6 HOURS PRN
Status: COMPLETED | OUTPATIENT
Start: 2023-11-10 | End: 2023-11-10

## 2023-11-10 RX ORDER — METHYLERGONOVINE MALEATE 0.2 MG/ML
200 INJECTION INTRAVENOUS
Status: DISCONTINUED | OUTPATIENT
Start: 2023-11-10 | End: 2023-11-10 | Stop reason: HOSPADM

## 2023-11-10 RX ORDER — DEXAMETHASONE SODIUM PHOSPHATE 4 MG/ML
INJECTION, SOLUTION INTRA-ARTICULAR; INTRALESIONAL; INTRAMUSCULAR; INTRAVENOUS; SOFT TISSUE PRN
Status: DISCONTINUED | OUTPATIENT
Start: 2023-11-10 | End: 2023-11-10 | Stop reason: SDUPTHER

## 2023-11-10 RX ORDER — IBUPROFEN 600 MG/1
600 TABLET ORAL EVERY 6 HOURS PRN
Qty: 30 TABLET | Refills: 1 | Status: SHIPPED | OUTPATIENT
Start: 2023-11-10

## 2023-11-10 RX ORDER — METHYLERGONOVINE MALEATE 0.2 MG/1
0.2 TABLET ORAL 3 TIMES DAILY
Qty: 9 TABLET | Refills: 0 | Status: SHIPPED | OUTPATIENT
Start: 2023-11-10 | End: 2023-11-13

## 2023-11-10 RX ORDER — SUCCINYLCHOLINE/SOD CL,ISO/PF 200MG/10ML
SYRINGE (ML) INTRAVENOUS PRN
Status: DISCONTINUED | OUTPATIENT
Start: 2023-11-10 | End: 2023-11-10 | Stop reason: SDUPTHER

## 2023-11-10 RX ORDER — METOCLOPRAMIDE HYDROCHLORIDE 5 MG/ML
INJECTION INTRAMUSCULAR; INTRAVENOUS PRN
Status: DISCONTINUED | OUTPATIENT
Start: 2023-11-10 | End: 2023-11-10 | Stop reason: SDUPTHER

## 2023-11-10 RX ORDER — LIDOCAINE HYDROCHLORIDE 20 MG/ML
INJECTION, SOLUTION EPIDURAL; INFILTRATION; INTRACAUDAL; PERINEURAL PRN
Status: DISCONTINUED | OUTPATIENT
Start: 2023-11-10 | End: 2023-11-10 | Stop reason: SDUPTHER

## 2023-11-10 RX ORDER — METOCLOPRAMIDE HYDROCHLORIDE 5 MG/ML
10 INJECTION INTRAMUSCULAR; INTRAVENOUS
Status: DISCONTINUED | OUTPATIENT
Start: 2023-11-10 | End: 2023-11-10 | Stop reason: HOSPADM

## 2023-11-10 RX ORDER — PROPOFOL 10 MG/ML
INJECTION, EMULSION INTRAVENOUS PRN
Status: DISCONTINUED | OUTPATIENT
Start: 2023-11-10 | End: 2023-11-10 | Stop reason: SDUPTHER

## 2023-11-10 RX ORDER — HYDRALAZINE HYDROCHLORIDE 20 MG/ML
10 INJECTION INTRAMUSCULAR; INTRAVENOUS
Status: DISCONTINUED | OUTPATIENT
Start: 2023-11-10 | End: 2023-11-10 | Stop reason: HOSPADM

## 2023-11-10 RX ADMIN — DEXAMETHASONE SODIUM PHOSPHATE 8 MG: 4 INJECTION INTRA-ARTICULAR; INTRALESIONAL; INTRAMUSCULAR; INTRAVENOUS; SOFT TISSUE at 09:22

## 2023-11-10 RX ADMIN — ONDANSETRON 4 MG: 2 INJECTION INTRAMUSCULAR; INTRAVENOUS at 09:03

## 2023-11-10 RX ADMIN — Medication 100 MG: at 09:18

## 2023-11-10 RX ADMIN — Medication 10 MG: at 09:34

## 2023-11-10 RX ADMIN — SODIUM CHLORIDE, POTASSIUM CHLORIDE, SODIUM LACTATE AND CALCIUM CHLORIDE: 600; 310; 30; 20 INJECTION, SOLUTION INTRAVENOUS at 08:04

## 2023-11-10 RX ADMIN — MIDAZOLAM 2 MG: 1 INJECTION INTRAMUSCULAR; INTRAVENOUS at 09:13

## 2023-11-10 RX ADMIN — Medication 10 MG: at 09:48

## 2023-11-10 RX ADMIN — LIDOCAINE HYDROCHLORIDE 100 MG: 20 INJECTION, SOLUTION EPIDURAL; INFILTRATION; INTRACAUDAL; PERINEURAL at 09:18

## 2023-11-10 RX ADMIN — ACETAMINOPHEN 1000 MG: 500 TABLET ORAL at 07:53

## 2023-11-10 RX ADMIN — METOCLOPRAMIDE 5 MG: 5 INJECTION, SOLUTION INTRAMUSCULAR; INTRAVENOUS at 09:22

## 2023-11-10 RX ADMIN — PROPOFOL 250 MG: 10 INJECTION, EMULSION INTRAVENOUS at 09:18

## 2023-11-10 RX ADMIN — HYDROCODONE BITARTRATE AND ACETAMINOPHEN 1 TABLET: 5; 325 TABLET ORAL at 10:39

## 2023-11-10 RX ADMIN — GABAPENTIN 300 MG: 300 CAPSULE ORAL at 07:53

## 2023-11-10 RX ADMIN — METHYLERGONOVINE MALEATE 200 MCG: 0.2 INJECTION, SOLUTION INTRAMUSCULAR; INTRAVENOUS at 09:44

## 2023-11-10 RX ADMIN — DOXYCYCLINE 200 MG: 100 CAPSULE ORAL at 07:54

## 2023-11-10 RX ADMIN — OXYTOCIN 10 UNITS: 10 INJECTION INTRAVENOUS at 09:41

## 2023-11-10 RX ADMIN — SUGAMMADEX 25 MG: 100 INJECTION, SOLUTION INTRAVENOUS at 09:47

## 2023-11-10 RX ADMIN — ROCURONIUM BROMIDE 10 MG: 10 INJECTION, SOLUTION INTRAVENOUS at 09:18

## 2023-11-10 ASSESSMENT — PAIN DESCRIPTION - LOCATION: LOCATION: VAGINA

## 2023-11-10 ASSESSMENT — PAIN DESCRIPTION - DESCRIPTORS
DESCRIPTORS: CRAMPING
DESCRIPTORS: CRAMPING

## 2023-11-10 ASSESSMENT — PAIN DESCRIPTION - ORIENTATION: ORIENTATION: LOWER

## 2023-11-10 ASSESSMENT — LIFESTYLE VARIABLES: SMOKING_STATUS: 1

## 2023-11-10 ASSESSMENT — PAIN - FUNCTIONAL ASSESSMENT: PAIN_FUNCTIONAL_ASSESSMENT: 0-10

## 2023-11-10 ASSESSMENT — PAIN DESCRIPTION - PAIN TYPE: TYPE: SURGICAL PAIN

## 2023-11-10 ASSESSMENT — PAIN SCALES - GENERAL
PAINLEVEL_OUTOF10: 0
PAINLEVEL_OUTOF10: 6

## 2023-11-10 NOTE — INTERVAL H&P NOTE
1.20 k/uL Final    Eosinophils Absolute 11/07/2023 0.18  0.00 - 0.44 k/uL Final    Basophils Absolute 11/07/2023 <0.03  0.00 - 0.20 k/uL Final    Absolute Immature Granulocyte 11/07/2023 <0.03  0.00 - 0.30 k/uL Final    hCG Quant 11/07/2023 2,390.0 (H)  <5 mIU/mL Final    Comment:    Non-preg premeno   <=5  Postmeno           <=8  Male               <=3  If HCG results do not concur with clinical observations, additional testing to confirm   results is recommended. Hospital Outpatient Visit on 11/03/2023   Component Date Value Ref Range Status    hCG Quant 11/03/2023 5,659.0 (H)  <5 mIU/mL Final    Comment:    Non-preg premeno   <=5  Postmeno           <=8  Male               <=3  If HCG results do not concur with clinical observations, additional testing to confirm   results is recommended. Hospital Outpatient Visit on 10/27/2023   Component Date Value Ref Range Status    hCG Quant 10/27/2023 26,328.0 (H)  <5 mIU/mL Final    Comment:    Non-preg premeno   <=5  Postmeno           <=8  Male               <=3  If HCG results do not concur with clinical observations, additional testing to confirm   results is recommended.      Hospital Outpatient Visit on 10/25/2023   Component Date Value Ref Range Status    ABO/Rh 10/25/2023 O POSITIVE   Final    Antibody Screen 10/25/2023 NEGATIVE   Final    WBC 10/25/2023 6.3  3.5 - 11.3 k/uL Final    RBC 10/25/2023 4.07  3.95 - 5.11 m/uL Final    Hemoglobin 10/25/2023 12.7  11.9 - 15.1 g/dL Final    Hematocrit 10/25/2023 38.6  36.3 - 47.1 % Final    MCV 10/25/2023 94.8  82.6 - 102.9 fL Final    MCH 10/25/2023 31.2  25.2 - 33.5 pg Final    MCHC 10/25/2023 32.9  28.4 - 34.8 g/dL Final    RDW 10/25/2023 12.9  11.8 - 14.4 % Final    Platelets 62/67/1981 163  138 - 453 k/uL Final    MPV 10/25/2023 11.9  8.1 - 13.5 fL Final    NRBC Automated 10/25/2023 0.0  0.0 per 100 WBC Final    Neutrophils % 10/25/2023 46  36 - 65 % Final    Lymphocytes % 10/25/2023 47 (H)  24 - 43 % Final Monocytes % 10/25/2023 4  3 - 12 % Final    Eosinophils % 10/25/2023 3  1 - 4 % Final    Basophils % 10/25/2023 0  0 - 2 % Final    Immature Granulocytes 10/25/2023 0  0 % Final    Neutrophils Absolute 10/25/2023 2.88  1.50 - 8.10 k/uL Final    Lymphocytes Absolute 10/25/2023 2.96  1.10 - 3.70 k/uL Final    Monocytes Absolute 10/25/2023 0.28  0.10 - 1.20 k/uL Final    Eosinophils Absolute 10/25/2023 0.17  0.00 - 0.44 k/uL Final    Basophils Absolute 10/25/2023 <0.03  0.00 - 0.20 k/uL Final    Absolute Immature Granulocyte 10/25/2023 <0.03  0.00 - 0.30 k/uL Final    hCG Quant 10/25/2023 35,300.0 (H)  <5 mIU/mL Final    Comment:    Non-preg premeno   <=5  Postmeno           <=8  Male               <=3  If HCG results do not concur with clinical observations, additional testing to confirm   results is recommended. Assessment:  1. Missed ab      Planned Procedure:  1.  Suction D&C    Electronically signed by Will Leiva MD  on 11/10/2023 at 8:39 AM

## 2023-11-10 NOTE — OP NOTE
Gynecologic Operative Note      NAME: Kamryn Baltazar   MRN: [id number]  CSN: [id number]  : 1985    PROCEDURE DATE: 11/10/2023     Pre-op Diagnosis: Pre-Op Diagnosis Codes:     * Retained products of conception after delivery with complications [K08.9]     Post-op Diagnosis: Same    Procedure: Procedure(s):  DILATATION AND CURETTAGE SUCTION    Surgeon: Thao Boyle DO    Assistant:   Jania Espinosa M.D. PGY3      Circulator Documentation of Staff:  Surgeon(s) and Role:     * Thao Boyle DO - Primary    OR Staff:  * No surgical staff found *      Anesthesia Type: General  Anesthesia Staff: Anesthesiologist: Maria Alejandra Priest MD  CRNA: KODAK Mendez - ISMA      Complications: None      Estimated Blood Loss: 100 ml  Total IV Fluids:  900 ml  Urine Output: 150 ml clear      Specimens: * No specimens in log *  Implants: * No implants in log *    Drains: * No LDAs found *      Condition: Stable    Findings: enlarged RV uterus ~ 10-12 weeks size sounded to 10 cm    Sono showed gestational sac no pole in midline of uterus    Description of Procedure: (Understanding of limitations from template op-reports exist)  The patient was seen in the pre-operative area. The procedure risk and complications were reviewed. The consent , labs , and H&P were reviewed. The patient had all of her questions answered. The patient was moved to the operative suite where she was placed under general anesthesia by the anesthesiologist.  Time out was preformed. The patient was placed in the dorsal lithotomy position utilizing  Stirrups. The Positioning was checked without stress or pressure on any joints. She was prepped and draped in the normal sterile fashion. Her bladder was drained with a red doll catheter. A weighted speculum was placed along the posterior vaginal wall and the anterior lip of the cervix was visualized and grasped with an Allis clamp. The uterus was then sounded to 10 cm.  The cervix was dilated slightly using hegar dilators to a # 9 . A # 9 fr curved suction tip was utilized and curettage was preformed making multiple passes until all products of conception were removed. Using a large dull curette, the uterus was gently curetted/explored, and the suction curette was re-introduced and passed one addional time. The uterus was massaged and noted to be firm. IV pitocin began infusing and a single dose of methergine was given IM at a dose of 0.2 mg. The clamp was removed and there was noted to be adequate hemostasis. The weighted speculum was removed. The patient tolerated the procedure well and will be taken to PACU when in stable condition and confirmed by anesthesia. Sponge, needle and instrument counts were called for and found to be correct. Disposition:  The patient will return to my office in 2 weeks. We will review her pathology report and recommendations. She will be given discharge prescriptions of Methergine, Motrin and Doxycycline. She was counseled with her family that she is to report any temperature more than 100.4 F, pelvic pain, or heavy vaginal bleeding; She is to refrain from intercourse douching or tampons; No hot tubs baths lakes or pools. The patient will obtain serial BHCG levels every 2 weeks until they are less than 5. The patient voiced understanding of the above counseling. Pt to be at pelvic rest x 2 weeks. Pt to wait 3 normal cycles before trying to conceive.      LABS:  Blood Type/Rh:    ABO/Rh   Date Value Ref Range Status   10/25/2023 O POSITIVE  Final     Antibody Screen:    Antibody Screen   Date Value Ref Range Status   10/25/2023 NEGATIVE  Final     Hemoglobin:   Hemoglobin   Date Value Ref Range Status   11/07/2023 12.3 11.9 - 15.1 g/dL Final     Hematocrit:   Hematocrit   Date Value Ref Range Status   11/07/2023 36.7 36.3 - 47.1 % Final     Platelet Count:   Platelets   Date Value Ref Range Status   11/07/2023 144 138 - 453 k/uL Final       Rhogam Ordered: No  Iron Sulfate 325 mg PO BID Ordered: No  Colace 100 mg PO q hs Ordered: No      Electronically signed by Isadora Prakash DO on 11/10/23 at 9:55 AM EST

## 2023-11-10 NOTE — DISCHARGE INSTRUCTIONS
Dilation and Curettage   (D&C)     Definition   Dilation is a procedure to open and widen the cervix. The cervix is the entrance to the uterus. Curettage is the removal of the lining of the uterus by scraping. The lining is known as the endometrium. The two procedures are done together and are often referred to as a D&C. Dilation and Curettage       2011 [address].   Reasons for Procedure   A D&C is usually done to determine what condition is causing abnormal bleeding. Some conditions that may cause abnormal bleeding are:   Miscarriage   Irregularities in menstrual bleeding   Endometrial polyps   Abnormal endometrial thickening   Endometrial cancer   Sometimes a D&C is done to stop the bleeding, rather than to diagnose why you are bleeding. This is sometimes done after a miscarriage or after a delivery when all of the placenta did not come out like it was supposed to. A D&C is not advised if you have: Infection of the uterus   Infection of the fallopian tubes   Possible Complications   Complications are rare, but no procedure is completely free of risk. If you are planning to have a D&C, your doctor will review a list of possible complications which may include:   Complications related to the anesthetic   Injury to the cervix   Scarring of endometrium   Infection of the uterus or fallopian tubes   Uterine perforation (hole in the uterus)   Bleeding   Damage to other organs in the abdomen   In case of significant injury or bleeding, possible need to open the abdomen and have the uterine wound stitched closed   Possible need to remove the uterus ( hysterectomy )   Factors that may increase the risk of complications include:   Pre-existing infection   Pre-existing medical condition   What to Expect   Prior to Procedure   Talk to your doctor about all the medicines you are taking.  Up to one week before the surgery, you may be asked to stop taking some medicines, such as:   Aspirin or other discomfort. It is not uncommon to experience vaginal bleeding and discharge for some time. Most commonly, normal activity may be started after a few days. Refrain from placing anything inside your vagina until instructed by your doctor. The cervix has been opened, and this may make it easier for you to get an infection in your uterus. Your next menstrual cycle may not be regular. It may be late or early. It generally takes a few days for your doctor to receive the lab report. At your follow-up visit, your doctor will make recommendations for any additional treatment. Call Your Doctor   After arriving home, contact your doctor if any of the following occurs:   Signs of infection, including fever and chills, increasing pain, or foul-smelling vaginal discharge   Nausea or vomiting that does not stop   Abdominal pain   Vaginal bleeding that is saturating more than one sanitary pad per hour   Cough, shortness of breath, or chest pain   In case of an emergency, call 911 .

## 2023-11-14 ENCOUNTER — OFFICE VISIT (OUTPATIENT)
Dept: FAMILY MEDICINE CLINIC | Age: 38
End: 2023-11-14
Payer: COMMERCIAL

## 2023-11-14 VITALS — DIASTOLIC BLOOD PRESSURE: 60 MMHG | BODY MASS INDEX: 23.46 KG/M2 | WEIGHT: 141 LBS | SYSTOLIC BLOOD PRESSURE: 110 MMHG

## 2023-11-14 DIAGNOSIS — F41.9 ANXIETY: Primary | ICD-10-CM

## 2023-11-14 LAB — SURGICAL PATHOLOGY REPORT: NORMAL

## 2023-11-14 PROCEDURE — 4004F PT TOBACCO SCREEN RCVD TLK: CPT | Performed by: NURSE PRACTITIONER

## 2023-11-14 PROCEDURE — 99213 OFFICE O/P EST LOW 20 MIN: CPT | Performed by: NURSE PRACTITIONER

## 2023-11-14 PROCEDURE — G8427 DOCREV CUR MEDS BY ELIG CLIN: HCPCS | Performed by: NURSE PRACTITIONER

## 2023-11-14 PROCEDURE — G8420 CALC BMI NORM PARAMETERS: HCPCS | Performed by: NURSE PRACTITIONER

## 2023-11-14 PROCEDURE — G8484 FLU IMMUNIZE NO ADMIN: HCPCS | Performed by: NURSE PRACTITIONER

## 2023-11-14 ASSESSMENT — ENCOUNTER SYMPTOMS
SHORTNESS OF BREATH: 0
COUGH: 0

## 2023-11-14 NOTE — PROGRESS NOTES
Santo Soria (:  1985) is a 45 y.o. female,Established patient, here for evaluation of the following chief complaint(s): Anxiety and 3 Month Follow-Up         ASSESSMENT/PLAN:  1. Anxiety  Stable on gabapentin continue medication      Return in about 3 months (around 2024). Subjective   SUBJECTIVE/OBJECTIVE:  Anxiety  Patient reports no chest pain, palpitations or shortness of breath. Review of Systems   Constitutional:  Negative for chills and fever. Respiratory:  Negative for cough and shortness of breath. Cardiovascular:  Negative for chest pain, palpitations and leg swelling. Objective   Vitals:    23 0808   BP: 110/60     Wt Readings from Last 3 Encounters:   23 64 kg (141 lb)   23 65.8 kg (145 lb)   23 65.3 kg (144 lb)       Physical Exam  Constitutional:       Appearance: She is well-developed. HENT:      Right Ear: External ear normal.      Left Ear: External ear normal.      Nose: Nose normal.   Cardiovascular:      Rate and Rhythm: Normal rate and regular rhythm. Heart sounds: Normal heart sounds, S1 normal and S2 normal.   Pulmonary:      Effort: Pulmonary effort is normal. No respiratory distress. Breath sounds: Normal breath sounds. Musculoskeletal:         General: No deformity. Normal range of motion. Cervical back: Full passive range of motion without pain and normal range of motion. Skin:     General: Skin is warm and dry. Neurological:      Mental Status: She is alert and oriented to person, place, and time. An electronic signature was used to authenticate this note.     --KODAK Meraz - CNP

## 2023-12-10 PROBLEM — Z98.890 POST-OPERATIVE STATE: Status: RESOLVED | Noted: 2023-11-10 | Resolved: 2023-12-10

## 2024-01-04 DIAGNOSIS — O02.1 MISSED AB: ICD-10-CM

## 2024-01-04 DIAGNOSIS — Z76.0 MEDICATION REFILL: ICD-10-CM

## 2024-01-04 RX ORDER — IBUPROFEN 800 MG/1
TABLET ORAL
Qty: 60 TABLET | Refills: 2 | OUTPATIENT
Start: 2024-01-04

## 2024-01-04 RX ORDER — MISOPROSTOL 200 UG/1
TABLET ORAL
Qty: 15 TABLET | Refills: 0 | OUTPATIENT
Start: 2024-01-04

## 2024-01-04 RX ORDER — DOXYCYCLINE HYCLATE 100 MG
TABLET ORAL
Qty: 14 TABLET | Refills: 0 | OUTPATIENT
Start: 2024-01-04

## 2024-01-04 RX ORDER — IBUPROFEN 600 MG/1
600 TABLET ORAL EVERY 6 HOURS PRN
Qty: 30 TABLET | Refills: 1 | OUTPATIENT
Start: 2024-01-04

## 2024-01-04 RX ORDER — ACETAMINOPHEN 500 MG
TABLET ORAL
Qty: 180 TABLET | Refills: 0 | OUTPATIENT
Start: 2024-01-04

## 2024-01-04 RX ORDER — ONDANSETRON 4 MG/1
TABLET, ORALLY DISINTEGRATING ORAL
Qty: 21 TABLET | Refills: 0 | OUTPATIENT
Start: 2024-01-04

## 2024-01-04 RX ORDER — IBUPROFEN 600 MG/1
600 TABLET ORAL EVERY 6 HOURS PRN
Qty: 30 TABLET | Refills: 1 | Status: SHIPPED | OUTPATIENT
Start: 2024-01-04

## 2024-01-04 NOTE — TELEPHONE ENCOUNTER
The original prescription was discontinued on 9/13/2023 by Ladi Urrutia for the following reason: Therapy completed. Renewing this prescription may not be appropriate.

## 2024-01-08 ENCOUNTER — PATIENT MESSAGE (OUTPATIENT)
Dept: FAMILY MEDICINE CLINIC | Age: 39
End: 2024-01-08

## 2024-01-08 RX ORDER — ONDANSETRON 4 MG/1
4 TABLET, ORALLY DISINTEGRATING ORAL 3 TIMES DAILY PRN
Qty: 21 TABLET | Refills: 2 | Status: SHIPPED | OUTPATIENT
Start: 2024-01-08

## 2024-01-08 NOTE — TELEPHONE ENCOUNTER
From: Shayy Soria  To: Agata Pratt  Sent: 1/8/2024 3:33 PM EST  Subject: refill    Vicente Parmar. I am just messaging to see if you could please refill my Zofran. I do not an appointment for another month for refills.     Thank you   Gena

## 2024-02-05 DIAGNOSIS — F41.9 ANXIETY: ICD-10-CM

## 2024-02-05 RX ORDER — GABAPENTIN 400 MG/1
800 CAPSULE ORAL 3 TIMES DAILY
Qty: 180 CAPSULE | Refills: 2 | Status: SHIPPED | OUTPATIENT
Start: 2024-02-05 | End: 2024-05-05

## 2024-02-12 ENCOUNTER — HOSPITAL ENCOUNTER (OUTPATIENT)
Age: 39
Setting detail: SPECIMEN
Discharge: HOME | End: 2024-02-12

## 2024-02-12 DIAGNOSIS — N92.6 MISSED MENSES: ICD-10-CM

## 2024-02-12 DIAGNOSIS — N92.6 MISSED MENSES: Primary | ICD-10-CM

## 2024-02-12 LAB — B-HCG SERPL EIA 3RD IS-ACNC: 23 MIU/ML

## 2024-02-14 ENCOUNTER — TELEPHONE (OUTPATIENT)
Dept: OBGYN CLINIC | Age: 39
End: 2024-02-14

## 2024-02-14 DIAGNOSIS — Z34.90 EARLY STAGE OF PREGNANCY: Primary | ICD-10-CM

## 2024-02-14 NOTE — TELEPHONE ENCOUNTER
Patient notified of results and recommendations, patient to repeat labs in 1 week. Patient is already taking prenatal vitamins since her recent SAB a couple months ago.

## 2024-02-14 NOTE — TELEPHONE ENCOUNTER
----- Message from KODAK Bernstein CNP sent at 2/13/2024  8:10 AM EST -----  Quant HCG is 23  Repeat quant HCG in 1 week.  Prenatal vitamin 1 PO QD with 12 refills.

## 2024-02-16 ENCOUNTER — OFFICE VISIT (OUTPATIENT)
Dept: FAMILY MEDICINE CLINIC | Age: 39
End: 2024-02-16
Payer: COMMERCIAL

## 2024-02-16 VITALS
HEIGHT: 65 IN | WEIGHT: 156 LBS | BODY MASS INDEX: 25.99 KG/M2 | RESPIRATION RATE: 18 BRPM | SYSTOLIC BLOOD PRESSURE: 112 MMHG | HEART RATE: 64 BPM | DIASTOLIC BLOOD PRESSURE: 74 MMHG | OXYGEN SATURATION: 98 %

## 2024-02-16 DIAGNOSIS — K59.00 CONSTIPATION, UNSPECIFIED CONSTIPATION TYPE: ICD-10-CM

## 2024-02-16 DIAGNOSIS — F41.9 ANXIETY: Primary | ICD-10-CM

## 2024-02-16 PROCEDURE — G8417 CALC BMI ABV UP PARAM F/U: HCPCS | Performed by: NURSE PRACTITIONER

## 2024-02-16 PROCEDURE — 4004F PT TOBACCO SCREEN RCVD TLK: CPT | Performed by: NURSE PRACTITIONER

## 2024-02-16 PROCEDURE — G8484 FLU IMMUNIZE NO ADMIN: HCPCS | Performed by: NURSE PRACTITIONER

## 2024-02-16 PROCEDURE — G8427 DOCREV CUR MEDS BY ELIG CLIN: HCPCS | Performed by: NURSE PRACTITIONER

## 2024-02-16 PROCEDURE — 99214 OFFICE O/P EST MOD 30 MIN: CPT | Performed by: NURSE PRACTITIONER

## 2024-02-16 RX ORDER — POLYETHYLENE GLYCOL 3350 17 G/17G
17 POWDER, FOR SOLUTION ORAL DAILY PRN
Qty: 510 G | Refills: 2 | Status: SHIPPED | OUTPATIENT
Start: 2024-02-16 | End: 2024-03-17

## 2024-02-16 NOTE — PROGRESS NOTES
Shayy Soria (:  1985) is a 38 y.o. female,Established patient, here for evaluation of the following chief complaint(s):  Anxiety (3 month follow up), Health Maintenance (Depression screen completed. /), Constipation (Pt is requesting a script for miralax. /), and Other (Last time she was at [facility] she had BW completed and her glucose was at 60. /)         ASSESSMENT/PLAN:  1. Anxiety  Doing well on gabapentin, stable on dose.     2. Constipation, unspecified constipation type  -     polyethylene glycol (GLYCOLAX) 17 GM/SCOOP powder; Take 17 g by mouth daily as needed (constipation), Disp-510 g, R-2Normal      No follow-ups on file.         Subjective   SUBJECTIVE/OBJECTIVE:  Anxiety  Patient reports no chest pain, palpitations or shortness of breath.       Constipation  Pertinent negatives include no fever.       Review of Systems   Constitutional:  Negative for chills and fever.   Respiratory:  Negative for cough and shortness of breath.    Cardiovascular:  Negative for chest pain, palpitations and leg swelling.   Gastrointestinal:  Positive for constipation.          Objective   Vitals:    24 0745   BP: 112/74   Pulse: 64   Resp: 18   SpO2: 98%     Physical Exam  Constitutional:       Appearance: She is well-developed.   HENT:      Right Ear: External ear normal.      Left Ear: External ear normal.      Nose: Nose normal.   Cardiovascular:      Rate and Rhythm: Normal rate and regular rhythm.      Heart sounds: Normal heart sounds, S1 normal and S2 normal.   Pulmonary:      Effort: Pulmonary effort is normal. No respiratory distress.      Breath sounds: Normal breath sounds.   Musculoskeletal:         General: No deformity. Normal range of motion.      Cervical back: Full passive range of motion without pain and normal range of motion.   Skin:     General: Skin is warm and dry.   Neurological:      Mental Status: She is alert and oriented to person, place, and time.            An electronic

## 2024-02-19 ENCOUNTER — PATIENT MESSAGE (OUTPATIENT)
Dept: FAMILY MEDICINE CLINIC | Age: 39
End: 2024-02-19

## 2024-02-19 ENCOUNTER — HOSPITAL ENCOUNTER (OUTPATIENT)
Age: 39
Setting detail: SPECIMEN
Discharge: HOME | End: 2024-02-19

## 2024-02-19 DIAGNOSIS — Z34.90 EARLY STAGE OF PREGNANCY: ICD-10-CM

## 2024-02-19 LAB — B-HCG SERPL EIA 3RD IS-ACNC: <0.2 MIU/ML (ref 0–7)

## 2024-02-20 ENCOUNTER — TELEPHONE (OUTPATIENT)
Dept: OBGYN CLINIC | Age: 39
End: 2024-02-20

## 2024-02-20 DIAGNOSIS — N92.6 MISSED MENSES: Primary | ICD-10-CM

## 2024-02-20 NOTE — TELEPHONE ENCOUNTER
----- Message from KODAK Chen NP sent at 2/19/2024  4:58 PM EST -----  + quant  Repeat quant in 48 hours and again in 1 week for trending  Determine LMP  Order PNVs  Patient will need scheduled for NOB

## 2024-02-20 NOTE — TELEPHONE ENCOUNTER
Patient notified of negative quant level. Patient is currently bleeding. Instructed per Radha Herrmann if bleeding increases patient becomes dizzy or develops fever patient to be seen in ER.

## 2024-02-20 NOTE — TELEPHONE ENCOUNTER
From: Shayy Soria  To: Agata Pratt  Sent: 2/19/2024 11:23 PM EST  Subject: Antibiotic     Hey Agata. First, I'm sure you seen my blood work that I got done today. Very disappointing. Again.     But, I am messaging you to see if you would please send me over some Amoxicillian 875mg. I have an infected tooth. I tried to get into the same dentist office I've went to the last 15 years and now they don't take the insurance. I've tried so many dentists offices today and nobody is taking new clients. I will find somewhere and make an appointment asap, but in the meantime, could you please send me over some Amox. 875mg and ibprophen.     Thanks Nazario Avery

## 2024-02-21 RX ORDER — AMOXICILLIN 875 MG/1
875 TABLET, COATED ORAL 2 TIMES DAILY
Qty: 20 TABLET | Refills: 0 | Status: SHIPPED | OUTPATIENT
Start: 2024-02-21 | End: 2024-03-02

## 2024-02-22 ENCOUNTER — TELEPHONE (OUTPATIENT)
Dept: FAMILY MEDICINE CLINIC | Age: 39
End: 2024-02-22

## 2024-02-22 NOTE — TELEPHONE ENCOUNTER
----- Message from Shayy Soria sent at 2/22/2024 12:22 PM EST -----  Regarding: Appointment   Contact: [phone]  Agata,  I just made an appointment to get meds for depression.  They said you have nothing open and scheduled me with someone else.  Is there any way I can see you?  I don't care which office or if it's virtual. I would just rather see you.  You know what's going on.    Thanks   Gena

## 2024-02-27 DIAGNOSIS — Z76.0 MEDICATION REFILL: ICD-10-CM

## 2024-02-27 RX ORDER — IBUPROFEN 800 MG/1
TABLET ORAL
Qty: 60 TABLET | Refills: 2 | Status: SHIPPED | OUTPATIENT
Start: 2024-02-27

## 2024-02-29 ENCOUNTER — TELEMEDICINE (OUTPATIENT)
Dept: FAMILY MEDICINE CLINIC | Age: 39
End: 2024-02-29
Payer: COMMERCIAL

## 2024-02-29 DIAGNOSIS — F43.21 SITUATIONAL DEPRESSION: ICD-10-CM

## 2024-02-29 DIAGNOSIS — N96 RECURRENT PREGNANCY LOSS: Primary | ICD-10-CM

## 2024-02-29 PROCEDURE — 99213 OFFICE O/P EST LOW 20 MIN: CPT | Performed by: NURSE PRACTITIONER

## 2024-02-29 PROCEDURE — G8427 DOCREV CUR MEDS BY ELIG CLIN: HCPCS | Performed by: NURSE PRACTITIONER

## 2024-02-29 RX ORDER — SERTRALINE HYDROCHLORIDE 25 MG/1
25 TABLET, FILM COATED ORAL DAILY
Qty: 30 TABLET | Refills: 3 | Status: SHIPPED | OUTPATIENT
Start: 2024-02-29

## 2024-02-29 ASSESSMENT — PATIENT HEALTH QUESTIONNAIRE - PHQ9
3. TROUBLE FALLING OR STAYING ASLEEP: 0
7. TROUBLE CONCENTRATING ON THINGS, SUCH AS READING THE NEWSPAPER OR WATCHING TELEVISION: 0
4. FEELING TIRED OR HAVING LITTLE ENERGY: 0
8. MOVING OR SPEAKING SO SLOWLY THAT OTHER PEOPLE COULD HAVE NOTICED. OR THE OPPOSITE, BEING SO FIGETY OR RESTLESS THAT YOU HAVE BEEN MOVING AROUND A LOT MORE THAN USUAL: 0
SUM OF ALL RESPONSES TO PHQ QUESTIONS 1-9: 4
6. FEELING BAD ABOUT YOURSELF - OR THAT YOU ARE A FAILURE OR HAVE LET YOURSELF OR YOUR FAMILY DOWN: 0
SUM OF ALL RESPONSES TO PHQ QUESTIONS 1-9: 4
5. POOR APPETITE OR OVEREATING: 0
1. LITTLE INTEREST OR PLEASURE IN DOING THINGS: 2
10. IF YOU CHECKED OFF ANY PROBLEMS, HOW DIFFICULT HAVE THESE PROBLEMS MADE IT FOR YOU TO DO YOUR WORK, TAKE CARE OF THINGS AT HOME, OR GET ALONG WITH OTHER PEOPLE: 0
9. THOUGHTS THAT YOU WOULD BE BETTER OFF DEAD, OR OF HURTING YOURSELF: 0
2. FEELING DOWN, DEPRESSED OR HOPELESS: 2
SUM OF ALL RESPONSES TO PHQ9 QUESTIONS 1 & 2: 4
SUM OF ALL RESPONSES TO PHQ QUESTIONS 1-9: 4
SUM OF ALL RESPONSES TO PHQ QUESTIONS 1-9: 4

## 2024-02-29 ASSESSMENT — ENCOUNTER SYMPTOMS
SHORTNESS OF BREATH: 0
COUGH: 0

## 2024-02-29 NOTE — PROGRESS NOTES
Shayy Soria, was evaluated through a synchronous (real-time) audio-video encounter. The patient (or guardian if applicable) is aware that this is a billable service, which includes applicable co-pays. This Virtual Visit was conducted with patient's (and/or legal guardian's) consent. Patient identification was verified, and a caregiver was present when appropriate.   The patient was located at Home: [address] [place] [id number]  Provider was located at Home (Appt Dept State): LANA Soria (:  1985) is a Established patient, presenting virtually for evaluation of the following:    Assessment & Plan   Below is the assessment and plan developed based on review of pertinent history, physical exam, labs, studies, and medications.  1. Recurrent pregnancy loss  -     [facility] ([facility] In/Family Medicine)  2. Situational depression  -     sertraline (ZOLOFT) 25 MG tablet; Take 1 tablet by mouth daily, Disp-30 tablet, R-3Normal  -     [facility] Walk In Psych ([facility] In/Family Medicine)    Return in about 1 month (around 3/29/2024) for mood.       Subjective   DepressionPatient is not experiencing: nervousness/anxiety, palpitations and shortness of breath.        Gena has had two recent early pregnancy losses.  She is tearful.  Feels some of her mood change could be r/t hormones.  Agreeable with seeing a counselor.   Review of Systems   Constitutional:  Negative for chills and fever.   Respiratory:  Negative for cough and shortness of breath.    Cardiovascular:  Negative for chest pain, palpitations and leg swelling.   Psychiatric/Behavioral:  Positive for depression. Negative for dysphoric mood. The patient is not nervous/anxious.           Objective   Patient-Reported Vitals  No data recorded     Physical Exam  [INSTRUCTIONS:  \"[x]\" Indicates a positive item  \"[]\" Indicates a negative item  -- DELETE ALL ITEMS NOT EXAMINED]    Constitutional: [x] Appears

## 2024-04-21 DIAGNOSIS — Z76.0 MEDICATION REFILL: ICD-10-CM

## 2024-04-22 ENCOUNTER — HOSPITAL ENCOUNTER (OUTPATIENT)
Age: 39
Discharge: HOME | End: 2024-04-22
Payer: COMMERCIAL

## 2024-04-22 ENCOUNTER — TELEPHONE (OUTPATIENT)
Dept: OBGYN CLINIC | Age: 39
End: 2024-04-22

## 2024-04-22 DIAGNOSIS — N92.6 MISSED MENSES: Primary | ICD-10-CM

## 2024-04-22 DIAGNOSIS — Z34.90 EARLY STAGE OF PREGNANCY: Primary | ICD-10-CM

## 2024-04-22 DIAGNOSIS — N92.6 MISSED MENSES: ICD-10-CM

## 2024-04-22 LAB — B-HCG SERPL EIA 3RD IS-ACNC: 30 MIU/ML (ref 0–7)

## 2024-04-22 PROCEDURE — 84702 CHORIONIC GONADOTROPIN TEST: CPT

## 2024-04-22 PROCEDURE — 36415 COLL VENOUS BLD VENIPUNCTURE: CPT

## 2024-04-22 RX ORDER — IBUPROFEN 800 MG/1
TABLET ORAL
Qty: 60 TABLET | Refills: 2 | Status: SHIPPED | OUTPATIENT
Start: 2024-04-22

## 2024-04-22 RX ORDER — ONDANSETRON 4 MG/1
4 TABLET, ORALLY DISINTEGRATING ORAL 3 TIMES DAILY PRN
Qty: 21 TABLET | Refills: 2 | Status: SHIPPED | OUTPATIENT
Start: 2024-04-22

## 2024-04-22 NOTE — TELEPHONE ENCOUNTER
----- Message from KODAK Bernstein CNP sent at 4/22/2024  2:26 PM EDT -----  Repeat quant HCG in 1 week.  Prenatal vitamin 1 po QD with 12 refills.

## 2024-04-22 NOTE — TELEPHONE ENCOUNTER
Patient notified of results and recommendations, order in epic for patient to repeat labs in 1 week. Patient is already taking prenatal vitamins from her last SAB in February.

## 2024-04-22 NOTE — TELEPHONE ENCOUNTER
Last visit: 02/29/24  Last Med refill: 02/27/24 & 01/08/24  Does patient have enough medication for 72 hours: No:     Next Visit Date:  Future Appointments   Date Time Provider Department Center   4/29/2024 10:00 AM Heather Otero [facility]   5/6/2024  7:30 AM Agata Pratt, APRN - CNP [facility]       Health Maintenance   Topic Date Due    Hepatitis B vaccine (1 of 3 - 3-dose series) Never done    Varicella vaccine (1 of 2 - 2-dose childhood series) Never done    Hepatitis A vaccine (1 of 2 - Risk 2-dose series) Never done    Cervical cancer screen  Never done    COVID-19 Vaccine (3 - 2023-24 season) 09/01/2023    DTaP/Tdap/Td vaccine (1 - Tdap) 06/24/2024 (Originally 8/9/2004)    Flu vaccine (Season Ended) 11/14/2024 (Originally 8/1/2024)    Pneumococcal 0-64 years Vaccine (1 of 2 - PCV) 11/14/2024 (Originally 8/9/1991)    Depression Monitoring  02/28/2025    HIV screen  Completed    Hib vaccine  Aged Out    HPV vaccine  Aged Out    Polio vaccine  Aged Out    Meningococcal (ACWY) vaccine  Aged Out    Depression Screen  Discontinued    Diabetes screen  Discontinued    Hepatitis C screen  Discontinued       Hemoglobin A1C (%)   Date Value   07/05/2022 4.9             ( goal A1C is < 7)   No components found for: \"LABMICR\"  No results found for: \"LDLCHOLESTEROL\", \"LDLCALC\"    (goal LDL is <100)   AST (U/L)   Date Value   11/07/2023 14     ALT (U/L)   Date Value   11/07/2023 12     BUN (mg/dL)   Date Value   11/07/2023 8     BP Readings from Last 3 Encounters:   02/16/24 112/74   11/14/23 110/60   11/10/23 105/60          (goal 120/80)    All Future Testing planned in [facility]  Lab Frequency Next Occurrence               Patient Active Problem List:     Constipation     Endometriosis     Nicotine dependence     Panic attack     Hepatitis C antibody test positive     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

## 2024-04-24 ENCOUNTER — TELEPHONE (OUTPATIENT)
Dept: OBGYN CLINIC | Age: 39
End: 2024-04-24

## 2024-04-24 NOTE — TELEPHONE ENCOUNTER
Patient called [facility]. They are not a compounding Pharm Asking where script will be called to

## 2024-04-24 NOTE — TELEPHONE ENCOUNTER
Patient in early pregnancy, patient has a history of reoccurring miscarriages, patient was questioning if there is anything that could be done to help achieve a good pregnancy, reviewed with  and she stated patient can try progesterone vaginal suppositories, order to be sent to [facility]. Patient notified and given information to pharmacy to get script.

## 2024-04-27 DIAGNOSIS — F41.9 ANXIETY: ICD-10-CM

## 2024-04-29 ENCOUNTER — HOSPITAL ENCOUNTER (OUTPATIENT)
Age: 39
Discharge: HOME | End: 2024-04-29
Payer: COMMERCIAL

## 2024-04-29 ENCOUNTER — TELEPHONE (OUTPATIENT)
Dept: OBGYN CLINIC | Age: 39
End: 2024-04-29

## 2024-04-29 ENCOUNTER — TELEPHONE (OUTPATIENT)
Dept: PRIMARY CARE CLINIC | Age: 39
End: 2024-04-29

## 2024-04-29 DIAGNOSIS — Z34.90 EARLY STAGE OF PREGNANCY: ICD-10-CM

## 2024-04-29 LAB — B-HCG SERPL EIA 3RD IS-ACNC: 1 MIU/ML (ref 0–7)

## 2024-04-29 PROCEDURE — 84702 CHORIONIC GONADOTROPIN TEST: CPT

## 2024-04-29 PROCEDURE — 36415 COLL VENOUS BLD VENIPUNCTURE: CPT

## 2024-04-29 RX ORDER — GABAPENTIN 400 MG/1
800 CAPSULE ORAL 3 TIMES DAILY
Qty: 180 CAPSULE | Refills: 2 | Status: SHIPPED | OUTPATIENT
Start: 2024-04-29 | End: 2024-07-28

## 2024-04-29 NOTE — TELEPHONE ENCOUNTER
Last visit: 2/29/24  Last Med refill: 2/5/24  Does patient have enough medication for 72 hours: Yes    Next Visit Date:  Future Appointments   Date Time Provider Department Center   4/29/2024 10:00 AM Heather Otero L[facility]W [facility]   5/6/2024  7:30 AM Agata Pratt S, APRN - CNP [facility]       Health Maintenance   Topic Date Due    Hepatitis B vaccine (1 of 3 - 3-dose series) Never done    Varicella vaccine (1 of 2 - 2-dose childhood series) Never done    Hepatitis A vaccine (1 of 2 - Risk 2-dose series) Never done    Cervical cancer screen  Never done    COVID-19 Vaccine (3 - 2023-24 season) 09/01/2023    DTaP/Tdap/Td vaccine (1 - Tdap) 06/24/2024 (Originally 8/9/2004)    Flu vaccine (Season Ended) 11/14/2024 (Originally 8/1/2024)    Pneumococcal 0-64 years Vaccine (1 of 2 - PCV) 11/14/2024 (Originally 8/9/1991)    Depression Monitoring  02/28/2025    HIV screen  Completed    Hib vaccine  Aged Out    HPV vaccine  Aged Out    Polio vaccine  Aged Out    Meningococcal (ACWY) vaccine  Aged Out    Depression Screen  Discontinued    Diabetes screen  Discontinued    Hepatitis C screen  Discontinued       Hemoglobin A1C (%)   Date Value   07/05/2022 4.9             ( goal A1C is < 7)   No components found for: \"LABMICR\"  No results found for: \"LDLCHOLESTEROL\", \"LDLCALC\"    (goal LDL is <100)   AST (U/L)   Date Value   11/07/2023 14     ALT (U/L)   Date Value   11/07/2023 12     BUN (mg/dL)   Date Value   11/07/2023 8     BP Readings from Last 3 Encounters:   02/16/24 112/74   11/14/23 110/60   11/10/23 105/60          (goal 120/80)    All Future Testing planned in [facility]  Lab Frequency Next Occurrence   hCG, Quantitative, Pregnancy Once 04/22/2024               Patient Active Problem List:     Constipation     Endometriosis     Nicotine dependence     Panic attack     Hepatitis C antibody test positive     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

## 2024-04-29 NOTE — TELEPHONE ENCOUNTER
----- Message from KODAK Chen - NP sent at 4/29/2024  3:33 PM EDT -----  Quant negative   Schedule follow up for sab x2   Wait 3 normal cycles before TTC again

## 2024-04-29 NOTE — TELEPHONE ENCOUNTER
LVM to patient to let her know that Heather/Licha CHU will call pt back to get her rescheduled from her appt this morning. Pt had to cancel appt d/t having a miscarriage.

## 2024-05-01 ENCOUNTER — TELEPHONE (OUTPATIENT)
Age: 39
End: 2024-05-01

## 2024-05-01 SDOH — ECONOMIC STABILITY: FOOD INSECURITY: WITHIN THE PAST 12 MONTHS, THE FOOD YOU BOUGHT JUST DIDN'T LAST AND YOU DIDN'T HAVE MONEY TO GET MORE.: NEVER TRUE

## 2024-05-01 SDOH — ECONOMIC STABILITY: FOOD INSECURITY: WITHIN THE PAST 12 MONTHS, YOU WORRIED THAT YOUR FOOD WOULD RUN OUT BEFORE YOU GOT MONEY TO BUY MORE.: NEVER TRUE

## 2024-05-01 SDOH — ECONOMIC STABILITY: INCOME INSECURITY: HOW HARD IS IT FOR YOU TO PAY FOR THE VERY BASICS LIKE FOOD, HOUSING, MEDICAL CARE, AND HEATING?: NOT HARD AT ALL

## 2024-05-01 NOTE — TELEPHONE ENCOUNTER
[facility] call back if patient would like to schedule new patient appointment. Patient may give us a call at our office line at [phone] or [phone].

## 2024-05-03 ENCOUNTER — OFFICE VISIT (OUTPATIENT)
Dept: OBGYN CLINIC | Age: 39
End: 2024-05-03
Payer: COMMERCIAL

## 2024-05-03 ENCOUNTER — HOSPITAL ENCOUNTER (OUTPATIENT)
Age: 39
Discharge: HOME | End: 2024-05-03
Payer: COMMERCIAL

## 2024-05-03 VITALS
HEIGHT: 65 IN | BODY MASS INDEX: 25.83 KG/M2 | WEIGHT: 155 LBS | SYSTOLIC BLOOD PRESSURE: 114 MMHG | DIASTOLIC BLOOD PRESSURE: 70 MMHG

## 2024-05-03 DIAGNOSIS — O02.1 MISSED AB: ICD-10-CM

## 2024-05-03 DIAGNOSIS — O02.1 MISSED AB: Primary | ICD-10-CM

## 2024-05-03 DIAGNOSIS — N96 HISTORY OF RECURRENT ABORTION, NOT CURRENTLY PREGNANT: ICD-10-CM

## 2024-05-03 LAB
CARDIOLIPIN IGA SER IA-ACNC: NORMAL APL
CARDIOLIPIN IGG SER IA-ACNC: NORMAL GPL
CARDIOLIPIN IGM SER IA-ACNC: NORMAL MPL
DILUTE RUSSELL VIPER VENOM TIME: NORMAL
HCYS SERPL-SCNC: 9.9 UMOL/L (ref 0–15)
INR PPP: 1
LUPUS ANTICOAG: NORMAL
PARTIAL THROMBOPLASTIN TIME: 31 SEC (ref 23–36.5)
PROTHROMBIN TIME: 12.7 SEC (ref 11.7–14.9)
T PALLIDUM AB SER QL IA: NONREACTIVE

## 2024-05-03 PROCEDURE — 86225 DNA ANTIBODY NATIVE: CPT

## 2024-05-03 PROCEDURE — 81241 F5 GENE: CPT

## 2024-05-03 PROCEDURE — 4004F PT TOBACCO SCREEN RCVD TLK: CPT | Performed by: OBSTETRICS & GYNECOLOGY

## 2024-05-03 PROCEDURE — 86146 BETA-2 GLYCOPROTEIN ANTIBODY: CPT

## 2024-05-03 PROCEDURE — 86235 NUCLEAR ANTIGEN ANTIBODY: CPT

## 2024-05-03 PROCEDURE — 85302 CLOT INHIBIT PROT C ANTIGEN: CPT

## 2024-05-03 PROCEDURE — 85301 ANTITHROMBIN III ANTIGEN: CPT

## 2024-05-03 PROCEDURE — 85610 PROTHROMBIN TIME: CPT

## 2024-05-03 PROCEDURE — G8427 DOCREV CUR MEDS BY ELIG CLIN: HCPCS | Performed by: OBSTETRICS & GYNECOLOGY

## 2024-05-03 PROCEDURE — 85613 RUSSELL VIPER VENOM DILUTED: CPT

## 2024-05-03 PROCEDURE — G8417 CALC BMI ABV UP PARAM F/U: HCPCS | Performed by: OBSTETRICS & GYNECOLOGY

## 2024-05-03 PROCEDURE — 86147 CARDIOLIPIN ANTIBODY EA IG: CPT

## 2024-05-03 PROCEDURE — 85300 ANTITHROMBIN III ACTIVITY: CPT

## 2024-05-03 PROCEDURE — 36415 COLL VENOUS BLD VENIPUNCTURE: CPT

## 2024-05-03 PROCEDURE — 85730 THROMBOPLASTIN TIME PARTIAL: CPT

## 2024-05-03 PROCEDURE — 86038 ANTINUCLEAR ANTIBODIES: CPT

## 2024-05-03 PROCEDURE — 85306 CLOT INHIBIT PROT S FREE: CPT

## 2024-05-03 PROCEDURE — 85303 CLOT INHIBIT PROT C ACTIVITY: CPT

## 2024-05-03 PROCEDURE — 86780 TREPONEMA PALLIDUM: CPT

## 2024-05-03 PROCEDURE — 83090 ASSAY OF HOMOCYSTEINE: CPT

## 2024-05-03 PROCEDURE — 99213 OFFICE O/P EST LOW 20 MIN: CPT | Performed by: OBSTETRICS & GYNECOLOGY

## 2024-05-03 NOTE — PROGRESS NOTES
Faxed and called to request any recent EKG, Chest x-rays and lab results from Dr Canales's office. Called X 2. No information faxed to writer.   These fetal cells are then karyotyped for genetic evaluation.  All of these tests, CVS, NIPT and amniocentesis, let couples know if the fetus will have genetic abnormalities, and will help them make informed decisions regarding their pregnancy. Karyotyping by either CVS, NIPT or Amniocentesis is the ONLY way to confirm the genetic chromosomal structure regarding trisomy; Down's Syndrome, Edward's or Pateau's. [facility] was reviewed. If NIPT is positive then a confirmatory amniocentesis would be recommended to confirm the diagnosis.  [facility] guidelines were reviewed.          Drug abuse in remission (HCC) 06/04/2018    Hepatitis C antibody test positive 04/02/2018    Panic attack 01/02/2018    Constipation 10/22/2014    Endometriosis 10/22/2014    Nicotine dependence 10/22/2014           PLAN:  Return in about 3 months (around 8/3/2024) for Follow up labs/diagnostics.  Repeat Annual every 1 year  Cervical Cytology Evaluation begins at 21 years old.  If Negative Cytology, Follow-up screening per current guidelines.   Return to the office in 12 weeks.  Counseled on preventative health maintenance follow-up.  Orders Placed This Encounter   Procedures    Antithrombin 3 Activity     Standing Status:   Future     Standing Expiration Date:   7/3/2024    Antithrombin 3 Antigen     Standing Status:   Future     Standing Expiration Date:   7/3/2024    Factor 5 Leiden     Standing Status:   Future     Standing Expiration Date:   5/3/2025     Order Specific Question:   Factor V Specimen     Answer:   BLOOD    Homocysteine     Standing Status:   Future     Standing Expiration Date:   7/3/2024    MTHFR Mutation 677T/[id number]     Standing Status:   Future     Standing Expiration Date:   5/3/2025    T. pallidum Ab     Standing Status:   Future     Standing Expiration Date:   6/2/2024    Prothrombin 77628 Mutation Invader     Standing Status:   Future     Standing Expiration Date:   5/3/2025    Protein S Activity     Standing Status:

## 2024-05-05 LAB
AT III AG ACT/NOR PPP IA: 108 % (ref 82–136)
PROT S FREE AG ACT/NOR PPP IA: 83 % (ref 55–123)

## 2024-05-06 ENCOUNTER — PATIENT MESSAGE (OUTPATIENT)
Dept: FAMILY MEDICINE CLINIC | Age: 39
End: 2024-05-06

## 2024-05-06 ENCOUNTER — OFFICE VISIT (OUTPATIENT)
Dept: FAMILY MEDICINE CLINIC | Age: 39
End: 2024-05-06
Payer: COMMERCIAL

## 2024-05-06 VITALS — SYSTOLIC BLOOD PRESSURE: 120 MMHG | BODY MASS INDEX: 25.96 KG/M2 | WEIGHT: 156 LBS | DIASTOLIC BLOOD PRESSURE: 60 MMHG

## 2024-05-06 DIAGNOSIS — E16.2 HYPOGLYCEMIA: Primary | ICD-10-CM

## 2024-05-06 DIAGNOSIS — F43.21 SITUATIONAL DEPRESSION: ICD-10-CM

## 2024-05-06 LAB
ANA SER QL IA: NEGATIVE
B2 GLYCOPROT1 IGA SERPL IA-ACNC: 1.9 ELISA U/ML (ref 0–7)
B2 GLYCOPROT1 IGG SERPL IA-ACNC: 1 ELISA U/ML (ref 0–7)
B2 GLYCOPROT1 IGM SERPL IA-ACNC: <0.9 ELISA U/ML (ref 0–7)
DSDNA IGG SER QL IA: 1.1 IU/ML
NUCLEAR IGG SER IA-RTO: 0.3 U/ML
PROT C AG ACT/NOR PPP IA: >95 % (ref 63–153)

## 2024-05-06 PROCEDURE — 99213 OFFICE O/P EST LOW 20 MIN: CPT | Performed by: NURSE PRACTITIONER

## 2024-05-06 PROCEDURE — G8417 CALC BMI ABV UP PARAM F/U: HCPCS | Performed by: NURSE PRACTITIONER

## 2024-05-06 PROCEDURE — 4004F PT TOBACCO SCREEN RCVD TLK: CPT | Performed by: NURSE PRACTITIONER

## 2024-05-06 PROCEDURE — G8427 DOCREV CUR MEDS BY ELIG CLIN: HCPCS | Performed by: NURSE PRACTITIONER

## 2024-05-06 ASSESSMENT — ENCOUNTER SYMPTOMS
COUGH: 0
SHORTNESS OF BREATH: 0

## 2024-05-06 NOTE — PROGRESS NOTES
Patient is present for 3 month f/u for med refills    Patient would like to discuss increasing zoloft

## 2024-05-06 NOTE — TELEPHONE ENCOUNTER
From: Shayy Soria  To: Agata Pratt  Sent: 5/6/2024 2:55 PM EDT  Subject: I forgot to ask you...    [place], I forgot to talk to you about my blood sugar. My [facility] doc said that on my blood work my sugar was extremely low. I do notice towards afternoon time I'll start getting extremely shaky and not feel well, but after I'll eat I'll be okay. I also understand that blood sugar could be a reason for miscarriages as well. Could you prescribe me the meter to start checking it or is there blood work to do instead?    Thank you!    Thank you.

## 2024-05-06 NOTE — PROGRESS NOTES
Shayy Soria (:  1985) is a 38 y.o. female,Established patient, here for evaluation of the following chief complaint(s):  3 Month Follow-Up and Medication Refill      Assessment & Plan   ASSESSMENT/PLAN:  1. Situational depression  -     sertraline (ZOLOFT) 50 MG tablet; Take 1 tablet by mouth daily, Disp-90 tablet, R-0Normal    Gena has had a lot of situational stress and recurrent miscarriages.  She would like an increase on her zoloft. She does feel it is helping but feels it could help better.   Denies si.     Return in about 3 months (around 2024).         Subjective   SUBJECTIVE/OBJECTIVE:  Medication Refill  Pertinent negatives include no chest pain, chills, coughing or fever.       Review of Systems   Constitutional:  Negative for chills and fever.   Respiratory:  Negative for cough and shortness of breath.    Cardiovascular:  Negative for chest pain, palpitations and leg swelling.          Objective   Vitals:    24 0728   BP: 120/60     Physical Exam  Constitutional:       Appearance: She is well-developed.   HENT:      Right Ear: External ear normal.      Left Ear: External ear normal.      Nose: Nose normal.   Cardiovascular:      Rate and Rhythm: Normal rate and regular rhythm.      Heart sounds: Normal heart sounds, S1 normal and S2 normal.   Pulmonary:      Effort: Pulmonary effort is normal. No respiratory distress.      Breath sounds: Normal breath sounds.   Musculoskeletal:         General: No deformity. Normal range of motion.      Cervical back: Full passive range of motion without pain and normal range of motion.   Skin:     General: Skin is warm and dry.   Neurological:      Mental Status: She is alert and oriented to person, place, and time.            An electronic signature was used to authenticate this note.    --LEIGH ANN ATKINSON, KODAK - CNP

## 2024-05-07 LAB
ENA SS-A IGG SER QL: 1.9 U/ML
ENA SS-B IGG SER IA-ACNC: 0.4 U/ML

## 2024-05-08 LAB
F5 P.R506Q BLD/T QL: NEGATIVE
SPECIMEN SOURCE: NORMAL

## 2024-05-08 RX ORDER — GLUCOSAMINE HCL/CHONDROITIN SU 500-400 MG
CAPSULE ORAL
Qty: 50 STRIP | Refills: 0 | Status: SHIPPED | OUTPATIENT
Start: 2024-05-08

## 2024-05-08 RX ORDER — LANCETS 30 GAUGE
1 EACH MISCELLANEOUS DAILY
Qty: 50 EACH | Refills: 0 | Status: SHIPPED | OUTPATIENT
Start: 2024-05-08

## 2024-05-09 ENCOUNTER — HOSPITAL ENCOUNTER (OUTPATIENT)
Age: 39
Setting detail: SPECIMEN
Discharge: HOME | End: 2024-05-09

## 2024-05-09 LAB
AT III ACT/NOR PPP CHRO: 111 % (ref 83–122)
CARDIOLIPIN IGA SER IA-ACNC: 2 APL (ref 0–14)
CARDIOLIPIN IGG SER IA-ACNC: 1.9 GPL (ref 0–10)
CARDIOLIPIN IGM SER IA-ACNC: 5.5 MPL (ref 0–10)
DILUTE RUSSELL VIPER VENOM TIME: NORMAL
INR PPP: 1
PARTIAL THROMBOPLASTIN TIME: 31 SEC (ref 23–36.5)
PROTEIN C ACTIVITY: 116 %
PROTEIN S ACTIVITY: 78 % (ref 59–130)
PROTHROMBIN TIME: 12.7 SEC (ref 11.7–14.9)

## 2024-05-13 LAB
MTHFR INTERPRETATION: NORMAL
MTHFR MUTATION A1286C: NORMAL
MTHFR MUTATION C665T: NORMAL
SPECIMEN: NORMAL

## 2024-05-14 ENCOUNTER — TELEPHONE (OUTPATIENT)
Dept: OBGYN CLINIC | Age: 39
End: 2024-05-14

## 2024-05-14 LAB
F2 C.20210G>A GENO BLD/T: NEGATIVE
SPECIMEN SOURCE: NORMAL

## 2024-05-14 NOTE — TELEPHONE ENCOUNTER
Patient notified of +MTHFR result, patient was instructed at her last appointment with  to wait 3 normal cycles before trying to conceive again, but would like to start taking the foltex as she would like to conceive. Please sent to [facility].

## 2024-05-14 NOTE — TELEPHONE ENCOUNTER
----- Message from KODAK Chen NP sent at 5/13/2024  4:16 PM EDT -----  MTHFR  Will need foltex if desires to TTC again

## 2024-06-09 DIAGNOSIS — F43.21 SITUATIONAL DEPRESSION: ICD-10-CM

## 2024-06-09 DIAGNOSIS — Z76.0 MEDICATION REFILL: ICD-10-CM

## 2024-06-10 RX ORDER — FLUTICASONE PROPIONATE 50 MCG
SPRAY, SUSPENSION (ML) NASAL
Qty: 1 EACH | Refills: 5 | Status: SHIPPED | OUTPATIENT
Start: 2024-06-10

## 2024-06-10 NOTE — TELEPHONE ENCOUNTER
Last visit: 05/06/2024  Last Med refill: 05/06/2024  Does patient have enough medication for 72 hours: Yes: 90 day supply no refills.       Next Visit Date:  Future Appointments   Date Time Provider Department Center   8/6/2024  7:30 AM Agata Pratt, APRN - CNP [facility]       Health Maintenance   Topic Date Due    Hepatitis B vaccine (1 of 3 - 3-dose series) Never done    Varicella vaccine (1 of 2 - 2-dose childhood series) Never done    Hepatitis A vaccine (1 of 2 - Risk 2-dose series) Never done    Cervical cancer screen  Never done    COVID-19 Vaccine (3 - 2023-24 season) 09/01/2023    DTaP/Tdap/Td vaccine (1 - Tdap) 06/24/2024 (Originally 8/9/2004)    Flu vaccine (Season Ended) 11/14/2024 (Originally 8/1/2024)    Pneumococcal 0-64 years Vaccine (1 of 2 - PCV) 11/14/2024 (Originally 8/9/1991)    Depression Monitoring  02/28/2025    HIV screen  Completed    Hib vaccine  Aged Out    HPV vaccine  Aged Out    Polio vaccine  Aged Out    Meningococcal (ACWY) vaccine  Aged Out    Depression Screen  Discontinued    Diabetes screen  Discontinued    Hepatitis C screen  Discontinued       Hemoglobin A1C (%)   Date Value   07/05/2022 4.9             ( goal A1C is < 7)   No components found for: \"LABMICR\"  No components found for: \"LDLCHOLESTEROL\", \"LDLCALC\"    (goal LDL is <100)   AST (U/L)   Date Value   11/07/2023 14     ALT (U/L)   Date Value   11/07/2023 12     BUN (mg/dL)   Date Value   11/07/2023 8     BP Readings from Last 3 Encounters:   05/06/24 120/60   05/03/24 114/70   02/16/24 112/74          (goal 120/80)    All Future Testing planned in [facility]  Lab Frequency Next Occurrence               Patient Active Problem List:     Constipation     Endometriosis     Nicotine dependence     Panic attack     Hepatitis C antibody test positive     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 multigravida of advanced maternal age

## 2024-07-07 ENCOUNTER — PATIENT MESSAGE (OUTPATIENT)
Dept: FAMILY MEDICINE CLINIC | Age: 39
End: 2024-07-07

## 2024-07-07 DIAGNOSIS — R82.90 MALODOROUS URINE: Primary | ICD-10-CM

## 2024-07-08 ENCOUNTER — E-VISIT (OUTPATIENT)
Dept: FAMILY MEDICINE CLINIC | Age: 39
End: 2024-07-08
Payer: COMMERCIAL

## 2024-07-08 DIAGNOSIS — N30.00 ACUTE CYSTITIS WITHOUT HEMATURIA: Primary | ICD-10-CM

## 2024-07-08 PROCEDURE — 99422 OL DIG E/M SVC 11-20 MIN: CPT | Performed by: NURSE PRACTITIONER

## 2024-07-08 RX ORDER — FLUCONAZOLE 150 MG/1
150 TABLET ORAL ONCE
Qty: 1 TABLET | Refills: 0 | Status: SHIPPED | OUTPATIENT
Start: 2024-07-08 | End: 2024-07-08

## 2024-07-08 NOTE — TELEPHONE ENCOUNTER
From: Shayy Soria  To: Agata Pratt  Sent: 7/7/2024 9:57 PM EDT  Subject: Infection    Hey Allisan. I got a yeast infection from antibiotics I had to take for the dentist. Could you call me in the pill for it, please. I think I'm also getting a UTI or a bladder infection. My pee smells really bad - or could this also be from the antibiotics?     Thanks!

## 2024-07-09 RX ORDER — SULFAMETHOXAZOLE AND TRIMETHOPRIM 800; 160 MG/1; MG/1
1 TABLET ORAL 2 TIMES DAILY
Qty: 10 TABLET | Refills: 0 | Status: SHIPPED | OUTPATIENT
Start: 2024-07-09 | End: 2024-07-14

## 2024-07-22 DIAGNOSIS — F41.9 ANXIETY: ICD-10-CM

## 2024-07-22 RX ORDER — GABAPENTIN 400 MG/1
800 CAPSULE ORAL 3 TIMES DAILY
Qty: 180 CAPSULE | Refills: 2 | OUTPATIENT
Start: 2024-07-22

## 2024-07-22 NOTE — TELEPHONE ENCOUNTER
Last visit: 07/08/24  Last Med refill: 06/24  Does patient have enough medication for 72 hours: No:     Next Visit Date:  Future Appointments   Date Time Provider Department Center   8/6/2024  7:30 AM Agata Pratt, APRN - CNP [facility]       Health Maintenance   Topic Date Due    Hepatitis B vaccine (1 of 3 - 3-dose series) Never done    Varicella vaccine (1 of 2 - 2-dose childhood series) Never done    Hepatitis A vaccine (1 of 2 - Risk 2-dose series) Never done    DTaP/Tdap/Td vaccine (1 - Tdap) Never done    Cervical cancer screen  Never done    COVID-19 Vaccine (3 - 2023-24 season) 09/01/2023    Pneumococcal 0-64 years Vaccine (1 of 2 - PCV) 11/14/2024 (Originally 8/9/1991)    Flu vaccine (1) 08/01/2024    Depression Monitoring  02/28/2025    HIV screen  Completed    Hib vaccine  Aged Out    HPV vaccine  Aged Out    Polio vaccine  Aged Out    Meningococcal (ACWY) vaccine  Aged Out    Depression Screen  Discontinued    Diabetes screen  Discontinued    Hepatitis C screen  Discontinued       Hemoglobin A1C (%)   Date Value   07/05/2022 4.9             ( goal A1C is < 7)   No components found for: \"LABMICR\"  No components found for: \"LDLCHOLESTEROL\", \"LDLCALC\"    (goal LDL is <100)   AST (U/L)   Date Value   11/07/2023 14     ALT (U/L)   Date Value   11/07/2023 12     BUN (mg/dL)   Date Value   11/07/2023 8     BP Readings from Last 3 Encounters:   05/06/24 120/60   05/03/24 114/70   02/16/24 112/74          (goal 120/80)    All Future Testing planned in [facility]  Lab Frequency Next Occurrence               Patient Active Problem List:     Constipation     Endometriosis     Nicotine dependence     Panic attack     Hepatitis C antibody test positive     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 multigravida of advanced maternal age

## 2024-07-23 RX ORDER — GABAPENTIN 400 MG/1
800 CAPSULE ORAL 3 TIMES DAILY
Qty: 180 CAPSULE | Refills: 2 | Status: SHIPPED | OUTPATIENT
Start: 2024-07-23 | End: 2024-10-21

## 2024-07-23 RX ORDER — ONDANSETRON 4 MG/1
4 TABLET, ORALLY DISINTEGRATING ORAL 3 TIMES DAILY PRN
Qty: 21 TABLET | Refills: 2 | Status: SHIPPED | OUTPATIENT
Start: 2024-07-23

## 2024-08-08 ENCOUNTER — TELEMEDICINE (OUTPATIENT)
Dept: FAMILY MEDICINE CLINIC | Age: 39
End: 2024-08-08
Payer: COMMERCIAL

## 2024-08-08 ENCOUNTER — TELEPHONE (OUTPATIENT)
Dept: FAMILY MEDICINE CLINIC | Age: 39
End: 2024-08-08

## 2024-08-08 DIAGNOSIS — Z76.0 MEDICATION REFILL: ICD-10-CM

## 2024-08-08 DIAGNOSIS — F41.9 ANXIETY: Primary | ICD-10-CM

## 2024-08-08 PROCEDURE — G8427 DOCREV CUR MEDS BY ELIG CLIN: HCPCS | Performed by: NURSE PRACTITIONER

## 2024-08-08 PROCEDURE — 99213 OFFICE O/P EST LOW 20 MIN: CPT | Performed by: NURSE PRACTITIONER

## 2024-08-08 RX ORDER — IBUPROFEN 800 MG/1
TABLET ORAL
Qty: 60 TABLET | Refills: 2 | Status: SHIPPED | OUTPATIENT
Start: 2024-08-08

## 2024-08-08 ASSESSMENT — ENCOUNTER SYMPTOMS
SHORTNESS OF BREATH: 0
COUGH: 0

## 2024-08-08 NOTE — PROGRESS NOTES
Patient is present for 3 month f/u  
visible   [] Abnormal -     HENT: [x] Normocephalic, atraumatic  [] Abnormal -   [x] Mouth/Throat: Mucous membranes are moist    External Ears [x] Normal  [] Abnormal -    Neck: [x] No visualized mass [] Abnormal -     Pulmonary/Chest: [x] Respiratory effort normal   [x] No visualized signs of difficulty breathing or respiratory distress        [] Abnormal -      Musculoskeletal:   [x] Normal gait with no signs of ataxia         [x] Normal range of motion of neck        [] Abnormal -     Neurological:        [x] No Facial Asymmetry (Cranial nerve 7 motor function) (limited exam due to video visit)          [x] No gaze palsy        [] Abnormal -          Skin:        [x] No significant exanthematous lesions or discoloration noted on facial skin         [] Abnormal -            Psychiatric:       [x] Normal Affect [] Abnormal -        [x] No Hallucinations    Other pertinent observable physical exam findings:-           --KODAK LARA - CNP

## 2024-08-20 PROBLEM — N96 RECURRENT PREGNANCY LOSS: Status: ACTIVE | Noted: 2024-08-20

## 2024-10-10 ENCOUNTER — TELEMEDICINE (OUTPATIENT)
Dept: FAMILY MEDICINE CLINIC | Age: 39
End: 2024-10-10
Payer: COMMERCIAL

## 2024-10-10 DIAGNOSIS — F43.21 SITUATIONAL DEPRESSION: ICD-10-CM

## 2024-10-10 DIAGNOSIS — N64.4 BREAST PAIN: Primary | ICD-10-CM

## 2024-10-10 DIAGNOSIS — Z76.0 MEDICATION REFILL: ICD-10-CM

## 2024-10-10 DIAGNOSIS — F41.9 ANXIETY: ICD-10-CM

## 2024-10-10 DIAGNOSIS — Z86.19 HISTORY OF HEPATITIS C: ICD-10-CM

## 2024-10-10 PROBLEM — B18.2 CHRONIC HEPATITIS C WITHOUT HEPATIC COMA (HCC): Status: ACTIVE | Noted: 2023-09-13

## 2024-10-10 PROBLEM — B18.2 CHRONIC HEPATITIS C WITHOUT HEPATIC COMA (HCC): Status: RESOLVED | Noted: 2023-09-13 | Resolved: 2024-10-10

## 2024-10-10 PROCEDURE — G8427 DOCREV CUR MEDS BY ELIG CLIN: HCPCS | Performed by: NURSE PRACTITIONER

## 2024-10-10 PROCEDURE — 99214 OFFICE O/P EST MOD 30 MIN: CPT | Performed by: NURSE PRACTITIONER

## 2024-10-10 RX ORDER — ONDANSETRON 4 MG/1
4 TABLET, ORALLY DISINTEGRATING ORAL 3 TIMES DAILY PRN
Qty: 21 TABLET | Refills: 2 | Status: SHIPPED | OUTPATIENT
Start: 2024-10-10

## 2024-10-10 RX ORDER — GABAPENTIN 400 MG/1
800 CAPSULE ORAL 3 TIMES DAILY
Qty: 180 CAPSULE | Refills: 2 | Status: SHIPPED | OUTPATIENT
Start: 2024-10-10 | End: 2025-01-08

## 2024-10-10 RX ORDER — LORATADINE 10 MG/1
10 TABLET ORAL DAILY
Qty: 90 TABLET | Refills: 1 | Status: SHIPPED | OUTPATIENT
Start: 2024-10-10

## 2024-10-10 ASSESSMENT — ENCOUNTER SYMPTOMS
SHORTNESS OF BREATH: 0
COUGH: 0

## 2024-10-10 NOTE — PROGRESS NOTES
Shayy Soria, was evaluated through a synchronous (real-time) audio-video encounter. The patient (or guardian if applicable) is aware that this is a billable service, which includes applicable co-pays. This Virtual Visit was conducted with patient's (and/or legal guardian's) consent. Patient identification was verified, and a caregiver was present when appropriate.   The patient was located at Home: [address] [place] [id number]  Provider was located at Home (Appt Dept State): [place]  Confirm you are appropriately licensed, registered, or certified to deliver care in the state where the patient is located as indicated above. If you are not or unsure, please re-schedule the visit: Yes, I confirm.     Shayy Soria (:  1985) is a Established patient, presenting virtually for evaluation of the following:      Below is the assessment and plan developed based on review of pertinent history, physical exam, labs, studies, and medications.     Assessment & Plan  Anxiety   Chronic, at goal (stable), continue current treatment plan    Orders:    gabapentin (NEURONTIN) 400 MG capsule; Take 2 capsules by mouth 3 times daily for 90 days.    Situational depression   Chronic, at goal (stable), continue current treatment plan    Orders:    sertraline (ZOLOFT) 50 MG tablet; Take 1 tablet by mouth daily    Medication refill       Orders:    loratadine (CLARITIN) 10 MG tablet; Take 1 tablet by mouth daily    Breast pain   Acute condition, new, follow up with mammo  No texture or color change. No discharge from nipple.   Orders:    FELIX DIGITAL DIAGNOSTIC W OR WO CAD BILATERAL; Future    History of hepatitis C   Monitored by specialist- no acute findings meriting change in the plan           No follow-ups on file.       Subjective   Medication Refill  Pertinent negatives include no chest pain, chills, coughing or fever.     Review of Systems   Constitutional:  Negative for chills and fever.   Respiratory:  Negative for

## 2024-10-15 ENCOUNTER — HOSPITAL ENCOUNTER (OUTPATIENT)
Age: 39
Discharge: HOME | End: 2024-10-15
Payer: COMMERCIAL

## 2024-10-15 PROCEDURE — 93005 ELECTROCARDIOGRAM TRACING: CPT | Performed by: FAMILY MEDICINE

## 2024-10-16 LAB
EKG ATRIAL RATE: 81 BPM
EKG P AXIS: 66 DEGREES
EKG P-R INTERVAL: 152 MS
EKG Q-T INTERVAL: 416 MS
EKG QRS DURATION: 100 MS
EKG QTC CALCULATION (BAZETT): 483 MS
EKG R AXIS: 69 DEGREES
EKG T AXIS: 46 DEGREES
EKG VENTRICULAR RATE: 81 BPM

## 2024-10-17 DIAGNOSIS — F41.9 ANXIETY: ICD-10-CM

## 2024-10-17 RX ORDER — GABAPENTIN 400 MG/1
800 CAPSULE ORAL 3 TIMES DAILY
Qty: 180 CAPSULE | Refills: 2 | OUTPATIENT
Start: 2024-10-17

## 2024-10-24 ENCOUNTER — HOSPITAL ENCOUNTER (OUTPATIENT)
Dept: MAMMOGRAPHY | Age: 39
Discharge: HOME | End: 2024-10-26
Payer: COMMERCIAL

## 2024-10-24 ENCOUNTER — HOSPITAL ENCOUNTER (OUTPATIENT)
Dept: ULTRASOUND IMAGING | Age: 39
Discharge: HOME | End: 2024-10-26
Payer: COMMERCIAL

## 2024-10-24 DIAGNOSIS — R52 PAIN: ICD-10-CM

## 2024-10-24 DIAGNOSIS — N64.4 BREAST PAIN: ICD-10-CM

## 2024-10-24 DIAGNOSIS — R92.8 ABNORMAL MAMMOGRAM: ICD-10-CM

## 2024-10-24 PROCEDURE — 76642 ULTRASOUND BREAST LIMITED: CPT

## 2024-10-24 PROCEDURE — G0279 TOMOSYNTHESIS, MAMMO: HCPCS

## 2024-10-25 DIAGNOSIS — R92.8 ABNORMAL MAMMOGRAM: Primary | ICD-10-CM

## 2025-01-07 DIAGNOSIS — F41.9 ANXIETY: ICD-10-CM

## 2025-01-07 RX ORDER — GABAPENTIN 400 MG/1
800 CAPSULE ORAL 3 TIMES DAILY
Qty: 180 CAPSULE | Refills: 2 | Status: SHIPPED | OUTPATIENT
Start: 2025-01-07 | End: 2025-04-07

## 2025-01-07 NOTE — TELEPHONE ENCOUNTER
Last visit: 10/10/24  Last Med refill: 10/10/24  Does patient have enough medication for 72 hours: Yes    Next Visit Date:  Future Appointments   Date Time Provider Department Center   1/10/2025  7:30 AM Agata Pratt, APRN - CNP [facility]       Health Maintenance   Topic Date Due    Varicella vaccine (1 of 2 - 13+ 2-dose series) Never done    Hepatitis B vaccine (1 of 3 - 19+ 3-dose series) Never done    DTaP/Tdap/Td vaccine (1 - Tdap) Never done    Cervical cancer screen  Never done    Flu vaccine (1) Never done    COVID-19 Vaccine (3 - 2023-24 season) 09/01/2024    Depression Monitoring  02/28/2025    HIV screen  Completed    Hepatitis A vaccine  Aged Out    Hib vaccine  Aged Out    HPV vaccine  Aged Out    Polio vaccine  Aged Out    Meningococcal (ACWY) vaccine  Aged Out    Pneumococcal 0-64 years Vaccine  Aged Out    Depression Screen  Discontinued    Diabetes screen  Discontinued    Hepatitis C screen  Discontinued       Hemoglobin A1C (%)   Date Value   07/05/2022 4.9             ( goal A1C is < 7)   No components found for: \"LABMICR\"  No components found for: \"LDLCHOLESTEROL\", \"LDLCALC\"    (goal LDL is <100)   AST (U/L)   Date Value   11/07/2023 14     ALT (U/L)   Date Value   11/07/2023 12     BUN (mg/dL)   Date Value   11/07/2023 8     BP Readings from Last 3 Encounters:   05/06/24 120/60   05/03/24 114/70   02/16/24 112/74          (goal 120/80)    All Future Testing planned in [facility]  Lab Frequency Next Occurrence   FELIX ARMEN DIGITAL DIAGNOSTIC BILATERAL Once 10/25/2024               Patient Active Problem List:     Constipation     Endometriosis     Nicotine dependence     Panic attack     Hepatitis C antibody test positive     Drug abuse in remission (HCC)     Vapes nicotine containing substance     Antepartum multigravida of advanced maternal age     Heterozygous MTHFR mutation [id number]     Heterozygous MTHFR mutation C677T     Recurrent pregnancy loss     History of hepatitis

## 2025-01-08 ENCOUNTER — PATIENT MESSAGE (OUTPATIENT)
Dept: FAMILY MEDICINE CLINIC | Age: 40
End: 2025-01-08

## 2025-01-08 DIAGNOSIS — R92.8 ABNORMAL MAMMOGRAM: Primary | ICD-10-CM

## 2025-01-10 ENCOUNTER — OFFICE VISIT (OUTPATIENT)
Dept: FAMILY MEDICINE CLINIC | Age: 40
End: 2025-01-10
Payer: COMMERCIAL

## 2025-01-10 ENCOUNTER — HOSPITAL ENCOUNTER (OUTPATIENT)
Age: 40
Setting detail: SPECIMEN
Discharge: HOME | End: 2025-01-10

## 2025-01-10 VITALS — WEIGHT: 165 LBS | DIASTOLIC BLOOD PRESSURE: 70 MMHG | BODY MASS INDEX: 27.46 KG/M2 | SYSTOLIC BLOOD PRESSURE: 120 MMHG

## 2025-01-10 DIAGNOSIS — F41.9 ANXIETY: Primary | ICD-10-CM

## 2025-01-10 DIAGNOSIS — F43.21 SITUATIONAL DEPRESSION: ICD-10-CM

## 2025-01-10 LAB
ALBUMIN SERPL-MCNC: 4.5 G/DL (ref 3.5–5.2)
ALBUMIN/GLOB SERPL: 1.6 {RATIO} (ref 1–2.5)
ALP SERPL-CCNC: 61 U/L (ref 35–104)
ALT SERPL-CCNC: 15 U/L (ref 10–35)
ANION GAP SERPL CALCULATED.3IONS-SCNC: 10 MMOL/L (ref 9–16)
AST SERPL-CCNC: 17 U/L (ref 10–35)
BASOPHILS # BLD: 0.04 K/UL (ref 0–0.2)
BASOPHILS NFR BLD: 1 % (ref 0–2)
BILIRUB SERPL-MCNC: 0.2 MG/DL (ref 0–1.2)
BUN SERPL-MCNC: 9 MG/DL (ref 6–20)
CALCIUM SERPL-MCNC: 9.3 MG/DL (ref 8.6–10.4)
CHLORIDE SERPL-SCNC: 105 MMOL/L (ref 98–107)
CO2 SERPL-SCNC: 25 MMOL/L (ref 20–31)
CREAT SERPL-MCNC: 0.7 MG/DL (ref 0.6–0.9)
EOSINOPHIL # BLD: 0.51 K/UL (ref 0–0.44)
EOSINOPHILS RELATIVE PERCENT: 10 % (ref 1–4)
ERYTHROCYTE [DISTWIDTH] IN BLOOD BY AUTOMATED COUNT: 13.5 % (ref 11.8–14.4)
GFR, ESTIMATED: >90 ML/MIN/1.73M2
GLUCOSE SERPL-MCNC: 107 MG/DL (ref 74–99)
HBV SURFACE AG SERPL QL IA: NONREACTIVE
HCT VFR BLD AUTO: 38.5 % (ref 36.3–47.1)
HCV AB SERPL QL IA: REACTIVE
HGB BLD-MCNC: 12.5 G/DL (ref 11.9–15.1)
HIV 1+2 AB+HIV1 P24 AG SERPL QL IA: NONREACTIVE
IMM GRANULOCYTES # BLD AUTO: <0.03 K/UL (ref 0–0.3)
IMM GRANULOCYTES NFR BLD: 0 %
LYMPHOCYTES NFR BLD: 1.59 K/UL (ref 1.1–3.7)
LYMPHOCYTES RELATIVE PERCENT: 32 % (ref 24–43)
MCH RBC QN AUTO: 30.3 PG (ref 25.2–33.5)
MCHC RBC AUTO-ENTMCNC: 32.5 G/DL (ref 28.4–34.8)
MCV RBC AUTO: 93.4 FL (ref 82.6–102.9)
MONOCYTES NFR BLD: 0.25 K/UL (ref 0.1–1.2)
MONOCYTES NFR BLD: 5 % (ref 3–12)
NEUTROPHILS NFR BLD: 52 % (ref 36–65)
NEUTS SEG NFR BLD: 2.58 K/UL (ref 1.5–8.1)
NRBC BLD-RTO: 0 PER 100 WBC
PLATELET # BLD AUTO: 183 K/UL (ref 138–453)
PMV BLD AUTO: 12.2 FL (ref 8.1–13.5)
POTASSIUM SERPL-SCNC: 4 MMOL/L (ref 3.7–5.3)
PROT SERPL-MCNC: 7.3 G/DL (ref 6.6–8.7)
RBC # BLD AUTO: 4.12 M/UL (ref 3.95–5.11)
SODIUM SERPL-SCNC: 140 MMOL/L (ref 136–145)
T PALLIDUM AB SER QL IA: NONREACTIVE
TSH SERPL DL<=0.05 MIU/L-ACNC: 1.27 UIU/ML (ref 0.27–4.2)
WBC OTHER # BLD: 5 K/UL (ref 3.5–11.3)

## 2025-01-10 PROCEDURE — G8417 CALC BMI ABV UP PARAM F/U: HCPCS | Performed by: NURSE PRACTITIONER

## 2025-01-10 PROCEDURE — 99213 OFFICE O/P EST LOW 20 MIN: CPT | Performed by: NURSE PRACTITIONER

## 2025-01-10 PROCEDURE — G8427 DOCREV CUR MEDS BY ELIG CLIN: HCPCS | Performed by: NURSE PRACTITIONER

## 2025-01-10 PROCEDURE — 1036F TOBACCO NON-USER: CPT | Performed by: NURSE PRACTITIONER

## 2025-01-10 SDOH — ECONOMIC STABILITY: FOOD INSECURITY: WITHIN THE PAST 12 MONTHS, THE FOOD YOU BOUGHT JUST DIDN'T LAST AND YOU DIDN'T HAVE MONEY TO GET MORE.: NEVER TRUE

## 2025-01-10 SDOH — ECONOMIC STABILITY: FOOD INSECURITY: WITHIN THE PAST 12 MONTHS, YOU WORRIED THAT YOUR FOOD WOULD RUN OUT BEFORE YOU GOT MONEY TO BUY MORE.: NEVER TRUE

## 2025-01-10 ASSESSMENT — PATIENT HEALTH QUESTIONNAIRE - PHQ9
SUM OF ALL RESPONSES TO PHQ9 QUESTIONS 1 & 2: 0
2. FEELING DOWN, DEPRESSED OR HOPELESS: NOT AT ALL
4. FEELING TIRED OR HAVING LITTLE ENERGY: NOT AT ALL
7. TROUBLE CONCENTRATING ON THINGS, SUCH AS READING THE NEWSPAPER OR WATCHING TELEVISION: NOT AT ALL
10. IF YOU CHECKED OFF ANY PROBLEMS, HOW DIFFICULT HAVE THESE PROBLEMS MADE IT FOR YOU TO DO YOUR WORK, TAKE CARE OF THINGS AT HOME, OR GET ALONG WITH OTHER PEOPLE: NOT DIFFICULT AT ALL
5. POOR APPETITE OR OVEREATING: NOT AT ALL
3. TROUBLE FALLING OR STAYING ASLEEP: NOT AT ALL
8. MOVING OR SPEAKING SO SLOWLY THAT OTHER PEOPLE COULD HAVE NOTICED. OR THE OPPOSITE, BEING SO FIGETY OR RESTLESS THAT YOU HAVE BEEN MOVING AROUND A LOT MORE THAN USUAL: NOT AT ALL
6. FEELING BAD ABOUT YOURSELF - OR THAT YOU ARE A FAILURE OR HAVE LET YOURSELF OR YOUR FAMILY DOWN: NOT AT ALL
SUM OF ALL RESPONSES TO PHQ QUESTIONS 1-9: 0
9. THOUGHTS THAT YOU WOULD BE BETTER OFF DEAD, OR OF HURTING YOURSELF: NOT AT ALL
SUM OF ALL RESPONSES TO PHQ QUESTIONS 1-9: 0
SUM OF ALL RESPONSES TO PHQ QUESTIONS 1-9: 0
1. LITTLE INTEREST OR PLEASURE IN DOING THINGS: NOT AT ALL
SUM OF ALL RESPONSES TO PHQ QUESTIONS 1-9: 0

## 2025-01-10 ASSESSMENT — ENCOUNTER SYMPTOMS
SHORTNESS OF BREATH: 0
COUGH: 0

## 2025-01-10 NOTE — PROGRESS NOTES
Shayy Soria (:  1985) is a 39 y.o. female,Established patient, here for evaluation of the following chief complaint(s):  3 Month Follow-Up      Assessment & Plan  Anxiety   Chronic, not at goal (unstable), continue current plan pending work up below    Orders:    [facility])    Situational depression   Chronic, not at goal (unstable), continue current plan pending work up below    Orders:    [facility] ([facility])    sertraline (ZOLOFT) 50 MG tablet; Take 1.5 tablets by mouth daily    Declines flu shot      No follow-ups on file.       Subjective   HPI    Review of Systems   Constitutional:  Negative for chills and fever.   Respiratory:  Negative for cough and shortness of breath.    Cardiovascular:  Negative for chest pain, palpitations and leg swelling.     Objective   Vitals:    01/10/25 0734   BP: 120/70       Physical Exam  Constitutional:       Appearance: She is well-developed.   HENT:      Right Ear: External ear normal.      Left Ear: External ear normal.      Nose: Nose normal.   Cardiovascular:      Rate and Rhythm: Normal rate and regular rhythm.      Heart sounds: Normal heart sounds, S1 normal and S2 normal.   Pulmonary:      Effort: Pulmonary effort is normal. No respiratory distress.      Breath sounds: Normal breath sounds.   Musculoskeletal:         General: No deformity. Normal range of motion.      Cervical back: Full passive range of motion without pain and normal range of motion.   Skin:     General: Skin is warm and dry.   Neurological:      Mental Status: She is alert and oriented to person, place, and time.         An electronic signature was used to authenticate this note.    --KODAK LARA - CNP

## 2025-01-17 LAB
QUANTI TB GOLD PLUS: NEGATIVE
QUANTI TB1 MINUS NIL: 0.14 IU/ML
QUANTI TB2 MINUS NIL: 0.23 IU/ML
QUANTIFERON MITOGEN: 7.71 IU/ML
QUANTIFERON NIL: 0.04 IU/ML

## 2025-03-06 ENCOUNTER — PATIENT MESSAGE (OUTPATIENT)
Dept: FAMILY MEDICINE CLINIC | Age: 40
End: 2025-03-06

## 2025-03-06 DIAGNOSIS — Z76.0 MEDICATION REFILL: ICD-10-CM

## 2025-03-06 NOTE — TELEPHONE ENCOUNTER
Last visit: 1/10/25  Last Med refill: 8/8/24  Does patient have enough medication for 72 hours: No:     Next Visit Date:  Future Appointments   Date Time Provider Department Center   4/11/2025  7:30 AM Agata Pratt, APRN - CNP [facility]       Health Maintenance   Topic Date Due    Varicella vaccine (1 of 2 - 13+ 2-dose series) Never done    Hepatitis B vaccine (1 of 3 - 19+ 3-dose series) Never done    DTaP/Tdap/Td vaccine (1 - Tdap) Never done    Cervical cancer screen  Never done    Flu vaccine (1) Never done    COVID-19 Vaccine (3 - 2024-25 season) 09/01/2024    Diabetes screen  07/05/2025    Depression Monitoring  01/10/2026    HIV screen  Completed    Hepatitis A vaccine  Aged Out    Hib vaccine  Aged Out    HPV vaccine  Aged Out    Polio vaccine  Aged Out    Meningococcal (ACWY) vaccine  Aged Out    Pneumococcal 0-49 years Vaccine  Aged Out    Depression Screen  Discontinued    Hepatitis C screen  Discontinued       Hemoglobin A1C (%)   Date Value   07/05/2022 4.9             ( goal A1C is < 7)   No components found for: \"LABMICR\"  No components found for: \"LDLCHOLESTEROL\", \"LDLCALC\"    (goal LDL is <100)   AST (U/L)   Date Value   01/10/2025 17     ALT (U/L)   Date Value   01/10/2025 15     BUN (mg/dL)   Date Value   01/10/2025 9     BP Readings from Last 3 Encounters:   01/10/25 120/70   05/06/24 120/60   05/03/24 114/70          (goal 120/80)    All Future Testing planned in [facility]  Lab Frequency Next Occurrence   FELIX ARMEN DIGITAL DIAGNOSTIC BILATERAL Once 10/25/2024   US BREAST LIMITED LEFT Once 01/08/2025   US BREAST LIMITED RIGHT Once 01/08/2025               Patient Active Problem List:     Constipation     Endometriosis     Nicotine dependence     Panic attack     Hepatitis C antibody test positive     Drug abuse in remission (HCC)     Vapes nicotine containing substance     Antepartum multigravida of advanced maternal age     Heterozygous MTHFR mutation [id number]

## 2025-03-07 RX ORDER — AMOXICILLIN 875 MG/1
875 TABLET, COATED ORAL 2 TIMES DAILY
Qty: 20 TABLET | Refills: 0 | Status: SHIPPED | OUTPATIENT
Start: 2025-03-07 | End: 2025-03-17

## 2025-03-07 RX ORDER — FLUCONAZOLE 150 MG/1
150 TABLET ORAL ONCE
Qty: 1 TABLET | Refills: 0 | Status: SHIPPED | OUTPATIENT
Start: 2025-03-07 | End: 2025-03-07

## 2025-03-07 RX ORDER — IBUPROFEN 800 MG/1
TABLET, FILM COATED ORAL
Qty: 60 TABLET | Refills: 2 | Status: SHIPPED | OUTPATIENT
Start: 2025-03-07

## 2025-04-08 DIAGNOSIS — F41.9 ANXIETY: ICD-10-CM

## 2025-04-08 NOTE — TELEPHONE ENCOUNTER
advanced maternal age     Heterozygous MTHFR mutation [id number]     Heterozygous MTHFR mutation C677T     Recurrent pregnancy loss     History of hepatitis C         Please advise

## 2025-04-09 RX ORDER — GABAPENTIN 400 MG/1
800 CAPSULE ORAL 3 TIMES DAILY
Qty: 180 CAPSULE | Refills: 2 | Status: SHIPPED | OUTPATIENT
Start: 2025-04-09 | End: 2025-07-08

## 2025-05-06 ENCOUNTER — OFFICE VISIT (OUTPATIENT)
Dept: FAMILY MEDICINE CLINIC | Age: 40
End: 2025-05-06
Payer: COMMERCIAL

## 2025-05-06 VITALS
SYSTOLIC BLOOD PRESSURE: 112 MMHG | WEIGHT: 160 LBS | HEIGHT: 65 IN | DIASTOLIC BLOOD PRESSURE: 60 MMHG | BODY MASS INDEX: 26.66 KG/M2

## 2025-05-06 DIAGNOSIS — F41.9 ANXIETY: Primary | ICD-10-CM

## 2025-05-06 DIAGNOSIS — Z76.0 MEDICATION REFILL: ICD-10-CM

## 2025-05-06 PROCEDURE — 1036F TOBACCO NON-USER: CPT | Performed by: NURSE PRACTITIONER

## 2025-05-06 PROCEDURE — 99213 OFFICE O/P EST LOW 20 MIN: CPT | Performed by: NURSE PRACTITIONER

## 2025-05-06 PROCEDURE — G8427 DOCREV CUR MEDS BY ELIG CLIN: HCPCS | Performed by: NURSE PRACTITIONER

## 2025-05-06 PROCEDURE — G8417 CALC BMI ABV UP PARAM F/U: HCPCS | Performed by: NURSE PRACTITIONER

## 2025-05-06 RX ORDER — LORATADINE 10 MG/1
10 TABLET ORAL DAILY
Qty: 90 TABLET | Refills: 1 | Status: SHIPPED | OUTPATIENT
Start: 2025-05-06

## 2025-05-06 RX ORDER — ONDANSETRON 4 MG/1
4 TABLET, ORALLY DISINTEGRATING ORAL 3 TIMES DAILY PRN
Qty: 21 TABLET | Refills: 2 | Status: SHIPPED | OUTPATIENT
Start: 2025-05-06

## 2025-05-06 RX ORDER — IBUPROFEN 800 MG/1
TABLET, FILM COATED ORAL
Qty: 60 TABLET | Refills: 2 | Status: SHIPPED | OUTPATIENT
Start: 2025-05-06

## 2025-05-06 ASSESSMENT — ENCOUNTER SYMPTOMS
COUGH: 0
SHORTNESS OF BREATH: 0

## 2025-05-06 NOTE — PROGRESS NOTES
Shayy Soria (:  1985) is a 39 y.o. female,Established patient, here for evaluation of the following chief complaint(s):  3 Month Follow-Up         Assessment & Plan  Medication refill       Orders:    loratadine (CLARITIN) 10 MG tablet; Take 1 tablet by mouth daily    ibuprofen (ADVIL;MOTRIN) 800 MG tablet; TAKE 1 TABLET BY MOUTH EVERY 6 HOURS AS NEEDED FOR PAIN    Anxiety   Chronic, at goal (stable), continue current treatment plan           Return in about 3 months (around 2025).       Subjective   HPI    Review of Systems   Constitutional:  Negative for chills and fever.   Respiratory:  Negative for cough and shortness of breath.    Cardiovascular:  Negative for chest pain, palpitations and leg swelling.          Objective   Vitals:    25 0735   BP: 112/60     Wt Readings from Last 3 Encounters:   25 72.6 kg (160 lb)   01/10/25 74.8 kg (165 lb)   24 70.8 kg (156 lb)       Physical Exam  Constitutional:       Appearance: She is well-developed.   HENT:      Right Ear: External ear normal.      Left Ear: External ear normal.      Nose: Nose normal.   Cardiovascular:      Rate and Rhythm: Normal rate and regular rhythm.      Heart sounds: Normal heart sounds, S1 normal and S2 normal.   Pulmonary:      Effort: Pulmonary effort is normal. No respiratory distress.      Breath sounds: Normal breath sounds.   Musculoskeletal:         General: No deformity. Normal range of motion.      Cervical back: Full passive range of motion without pain and normal range of motion.   Skin:     General: Skin is warm and dry.   Neurological:      Mental Status: She is alert and oriented to person, place, and time.                  An electronic signature was used to authenticate this note.    --LEIGH ANN ATKINSON, KODAK - CNP

## 2025-05-12 ENCOUNTER — PATIENT MESSAGE (OUTPATIENT)
Dept: FAMILY MEDICINE CLINIC | Age: 40
End: 2025-05-12

## 2025-05-12 DIAGNOSIS — N90.7 LABIAL CYST: Primary | ICD-10-CM

## 2025-05-14 RX ORDER — DOXYCYCLINE HYCLATE 100 MG
100 TABLET ORAL 2 TIMES DAILY
Qty: 14 TABLET | Refills: 0 | Status: SHIPPED | OUTPATIENT
Start: 2025-05-14 | End: 2025-05-21

## 2025-06-10 ENCOUNTER — PATIENT MESSAGE (OUTPATIENT)
Dept: FAMILY MEDICINE CLINIC | Age: 40
End: 2025-06-10

## 2025-06-10 DIAGNOSIS — R23.9 UNSPECIFIED SKIN CHANGES: Primary | ICD-10-CM

## 2025-06-30 ENCOUNTER — OFFICE VISIT (OUTPATIENT)
Age: 40
End: 2025-06-30
Payer: COMMERCIAL

## 2025-06-30 VITALS
TEMPERATURE: 98 F | DIASTOLIC BLOOD PRESSURE: 60 MMHG | HEIGHT: 65 IN | HEART RATE: 81 BPM | SYSTOLIC BLOOD PRESSURE: 98 MMHG | OXYGEN SATURATION: 95 % | BODY MASS INDEX: 26.49 KG/M2 | WEIGHT: 159 LBS

## 2025-06-30 DIAGNOSIS — Z78.9 BODY PIERCING: Primary | ICD-10-CM

## 2025-06-30 DIAGNOSIS — L72.0 EPIDERMAL INCLUSION CYST: ICD-10-CM

## 2025-06-30 PROCEDURE — G8417 CALC BMI ABV UP PARAM F/U: HCPCS | Performed by: DERMATOLOGY

## 2025-06-30 PROCEDURE — G8427 DOCREV CUR MEDS BY ELIG CLIN: HCPCS | Performed by: DERMATOLOGY

## 2025-06-30 PROCEDURE — 99202 OFFICE O/P NEW SF 15 MIN: CPT | Performed by: DERMATOLOGY

## 2025-06-30 PROCEDURE — 99203 OFFICE O/P NEW LOW 30 MIN: CPT | Performed by: DERMATOLOGY

## 2025-06-30 PROCEDURE — 1036F TOBACCO NON-USER: CPT | Performed by: DERMATOLOGY

## 2025-06-30 NOTE — PROGRESS NOTES
Dermatology Patient Note  [facility]  [facility] DERMATOLOGY  [address] [place] [address]  Dept: [phone]  Dept Fax: [phone]      VISITDATE: 2025   REFERRING PROVIDER: Agata Pratt, *      Shayy Soria is a 39 y.o. female  who presents today in the office for:    New Patient (Patient c/o of two dermal piercing's that patient would like removed.  Area around piercing is swollen.   Patient was referred by pcp.  Patient offers no other concerns today.  )      HISTORY OF PRESENT ILLNESS:  As above. New patient presents to \[facility]\"" care. Reports lesion near piercing on her right cheek by her eye, the lesion has recently grown. Patient notes she has tried to move the piercings. She has had the piercings for many years.     MEDICAL PROBLEMS:  Patient Active Problem List    Diagnosis Date Noted    History of hepatitis C 10/10/2024    Recurrent pregnancy loss 2024    Heterozygous MTHFR mutation [id number]     Heterozygous MTHFR mutation C677T     Vapes nicotine containing substance 2023    Antepartum multigravida of advanced maternal age 2023     Advanced Maternal Age Counseling    If a woman is over the age of 35, she is considered to be of Advanced Maternal Age, and with this title comes risks for mom and baby.   Increased risk of Down Syndrome - the most common chromosomal birth defect (chromosomes - cells that carry genes and transmit heredity information)   Increased risk of miscarriage   20% increase at ages 35 to 39   35% increase at ages 40-44   Over 50% increase by age 45  Increased risk of  Section () for delivery method   Gestational diabetes - diabetes that develops for the first time during pregnancy. Women who have this could possibly have a very large baby who then is at risk for injuries during delivery.   Pregnancy Induced Hypertension - High blood pressure   Placental Problems - one of the most common

## 2025-07-08 ENCOUNTER — OFFICE VISIT (OUTPATIENT)
Dept: FAMILY MEDICINE CLINIC | Age: 40
End: 2025-07-08
Payer: COMMERCIAL

## 2025-07-08 VITALS
SYSTOLIC BLOOD PRESSURE: 110 MMHG | OXYGEN SATURATION: 98 % | HEART RATE: 73 BPM | BODY MASS INDEX: 26.63 KG/M2 | WEIGHT: 160 LBS | DIASTOLIC BLOOD PRESSURE: 64 MMHG

## 2025-07-08 DIAGNOSIS — Z76.0 MEDICATION REFILL: ICD-10-CM

## 2025-07-08 DIAGNOSIS — F41.9 ANXIETY: ICD-10-CM

## 2025-07-08 DIAGNOSIS — F43.21 SITUATIONAL DEPRESSION: ICD-10-CM

## 2025-07-08 DIAGNOSIS — Z86.39 HISTORY OF ELEVATED GLUCOSE: Primary | ICD-10-CM

## 2025-07-08 LAB — HBA1C MFR BLD: 5.3 %

## 2025-07-08 PROCEDURE — G8427 DOCREV CUR MEDS BY ELIG CLIN: HCPCS | Performed by: NURSE PRACTITIONER

## 2025-07-08 PROCEDURE — 99214 OFFICE O/P EST MOD 30 MIN: CPT | Performed by: NURSE PRACTITIONER

## 2025-07-08 PROCEDURE — G8417 CALC BMI ABV UP PARAM F/U: HCPCS | Performed by: NURSE PRACTITIONER

## 2025-07-08 PROCEDURE — 83036 HEMOGLOBIN GLYCOSYLATED A1C: CPT | Performed by: NURSE PRACTITIONER

## 2025-07-08 PROCEDURE — 1036F TOBACCO NON-USER: CPT | Performed by: NURSE PRACTITIONER

## 2025-07-08 RX ORDER — IBUPROFEN 800 MG/1
TABLET, FILM COATED ORAL
Qty: 60 TABLET | Refills: 2 | Status: SHIPPED | OUTPATIENT
Start: 2025-07-08

## 2025-07-08 RX ORDER — ONDANSETRON 4 MG/1
4 TABLET, ORALLY DISINTEGRATING ORAL 3 TIMES DAILY PRN
Qty: 21 TABLET | Refills: 2 | Status: SHIPPED | OUTPATIENT
Start: 2025-07-08

## 2025-07-08 RX ORDER — GABAPENTIN 400 MG/1
800 CAPSULE ORAL 3 TIMES DAILY
Qty: 180 CAPSULE | Refills: 2 | Status: SHIPPED | OUTPATIENT
Start: 2025-07-08 | End: 2025-10-06

## 2025-07-08 ASSESSMENT — ENCOUNTER SYMPTOMS
COUGH: 0
SHORTNESS OF BREATH: 0

## 2025-07-08 NOTE — PROGRESS NOTES
72.1 kg (159 lb)   05/06/25 72.6 kg (160 lb)       Physical Exam  Constitutional:       Appearance: She is well-developed.   HENT:      Right Ear: External ear normal.      Left Ear: External ear normal.      Nose: Nose normal.   Cardiovascular:      Rate and Rhythm: Normal rate and regular rhythm.      Heart sounds: Normal heart sounds, S1 normal and S2 normal.   Pulmonary:      Effort: Pulmonary effort is normal. No respiratory distress.      Breath sounds: Normal breath sounds.   Musculoskeletal:         General: No deformity. Normal range of motion.      Cervical back: Full passive range of motion without pain and normal range of motion.   Skin:     General: Skin is warm and dry.   Neurological:      Mental Status: She is alert and oriented to person, place, and time.             Personalized Preventive Plan  Current Health Maintenance Status  Immunization History   Administered Date(s) Administered    COVID-19, PFIZER PURPLE top, DILUTE for use, (age 12 y+), 30mcg/0.3mL 04/08/2021, 04/29/2021    MMR, PRIORIX, M-M-R II, (age 12m+), SC, 0.5mL 08/08/1997        Health Maintenance Due   Topic Date Due    Varicella vaccine (1 of 2 - 13+ 2-dose series) Never done    Hepatitis B vaccine (1 of 3 - 19+ 3-dose series) Never done    DTaP/Tdap/Td vaccine (1 - Tdap) Never done    Cervical cancer screen  Never done    COVID-19 Vaccine (3 - 2024-25 season) 09/01/2024      Recommendations for Preventive Services Due: see orders and patient instructions/AVS.

## 2025-07-11 ENCOUNTER — TELEPHONE (OUTPATIENT)
Age: 40
End: 2025-07-11

## 2025-07-11 ENCOUNTER — PATIENT MESSAGE (OUTPATIENT)
Age: 40
End: 2025-07-11

## 2025-07-11 NOTE — TELEPHONE ENCOUNTER
Please call patient and ask if she can get information from her  about the exact name of the dermal piercing that were placed. Knowing the shape will be helpful when doing the removal.    Jacqueline Marie MD

## 2025-07-18 ENCOUNTER — PROCEDURE VISIT (OUTPATIENT)
Age: 40
End: 2025-07-18
Payer: COMMERCIAL

## 2025-07-18 VITALS
DIASTOLIC BLOOD PRESSURE: 79 MMHG | OXYGEN SATURATION: 93 % | HEART RATE: 91 BPM | TEMPERATURE: 98.7 F | SYSTOLIC BLOOD PRESSURE: 135 MMHG

## 2025-07-18 DIAGNOSIS — R20.8 SKIN PAIN: ICD-10-CM

## 2025-07-18 DIAGNOSIS — L72.0 EPIDERMAL INCLUSION CYST: Primary | ICD-10-CM

## 2025-07-18 DIAGNOSIS — Z78.9 BODY PIERCING: ICD-10-CM

## 2025-07-18 PROCEDURE — 12051 INTMD RPR FACE/MM 2.5 CM/<: CPT | Performed by: DERMATOLOGY

## 2025-07-18 PROCEDURE — 11441 EXC FACE-MM B9+MARG 0.6-1 CM: CPT | Performed by: DERMATOLOGY

## 2025-07-18 PROCEDURE — 10121 INC&RMVL FB SUBQ TISS COMP: CPT | Performed by: DERMATOLOGY

## 2025-07-18 NOTE — PROGRESS NOTES
Dermatology Procedure Note   [facility]  [facility] DERMATOLOGY  [address] [place] [address]  Dept: [phone]  Dept Fax: [phone]      Procedure Date: 7/18/2025  Procedure Time: 12:16 PM    Procedure Practitioner: Jacqueline Marie MD    Procedure: Excision of cyst and foreign body (dermal piercing x 2)            Pre-Procedure Diagnosis: Cyst and foreign body    Post-Procedure Diagnosis: Same as Pre-Procedure Diagnosis    Informed Consent: The procedure and its risks were explained including but not limited to pain, bleeding, infection, permanent scar, permanent pigment alteration and recurrence. Consent to proceed with the procedure was obtained from the patient or the parent by the practitioner    Time Out:  A time out was conducted immediately before starting the procedure that confirmed a final verification of the correct patient, correct procedure, and correct site.    Procedure Details:  Excision and layered closure: The 8 mm cyst and dermal piercing on the right lateral canthus (superior) was cleaned with betadine and anesthetized with 1% lidocaine with epinephrine. The lesion was then excised in an elliptical fashion down to subcutaneous fat with a 1 mm margin for a total excised diameter of 10 mm. Hemostasis was then achieved with electrocautery. Due to tension on the defect, undermining was performed to allow for closure. The subcutaneous tissues were then brought together with 5-0 Vicryl. The epidermis was approximated with 5-0gut running sutures. Final layered closure was 10 mm. Vaseline and a bulky wound dressing was applied.    Removal of foreign body  The procedure and its risks were explained including but not limited to pain, bleeding, infection, permanent scar, permanent pigment alteration and need for an additional procedure. Consent to proceed with the procedure was obtained from the patient or the parent. After cleaning with betadine the dermal

## 2025-07-21 ENCOUNTER — LAB (OUTPATIENT)
Dept: LAB | Age: 40
End: 2025-07-21

## (undated) DEVICE — SYSTEM COLL W/ TISS TRAP INCLUDE COLL CANSTR LID SET OF

## (undated) DEVICE — GLOVE ORANGE PI 7   MSG9070

## (undated) DEVICE — SET COLL TBNG L6FT DIA3/8IN W/ INTEGR SWVL HNDL SLIP RNG M

## (undated) DEVICE — CANNULA VAC 9MM CRV SEMI RIG STRL

## (undated) DEVICE — ST CHARLES PERI-GYN: Brand: MEDLINE INDUSTRIES, INC.